# Patient Record
Sex: MALE | Race: WHITE | NOT HISPANIC OR LATINO | Employment: OTHER | URBAN - METROPOLITAN AREA
[De-identification: names, ages, dates, MRNs, and addresses within clinical notes are randomized per-mention and may not be internally consistent; named-entity substitution may affect disease eponyms.]

---

## 2017-01-19 ENCOUNTER — ALLSCRIPTS OFFICE VISIT (OUTPATIENT)
Dept: OTHER | Facility: OTHER | Age: 52
End: 2017-01-19

## 2017-01-19 ENCOUNTER — HOSPITAL ENCOUNTER (OUTPATIENT)
Dept: RADIOLOGY | Facility: CLINIC | Age: 52
Discharge: HOME | End: 2017-01-19
Payer: MEDICARE

## 2017-01-19 DIAGNOSIS — S69.92XA UNSPECIFIED INJURY OF LEFT WRIST, HAND AND FINGER(S), INITIAL ENCOUNTER: ICD-10-CM

## 2017-01-19 DIAGNOSIS — M25.561 PAIN IN RIGHT KNEE: ICD-10-CM

## 2017-01-19 DIAGNOSIS — M25.532 PAIN IN LEFT WRIST: ICD-10-CM

## 2017-01-19 PROCEDURE — 73564 X-RAY EXAM KNEE 4 OR MORE: CPT

## 2017-01-23 ENCOUNTER — ALLSCRIPTS OFFICE VISIT (OUTPATIENT)
Dept: OTHER | Facility: OTHER | Age: 52
End: 2017-01-23

## 2017-01-23 ENCOUNTER — HOSPITAL ENCOUNTER (OUTPATIENT)
Dept: RADIOLOGY | Facility: HOSPITAL | Age: 52
Discharge: HOME | End: 2017-01-23
Payer: MEDICARE

## 2017-01-23 ENCOUNTER — TRANSCRIBE ORDERS (OUTPATIENT)
Dept: ADMINISTRATIVE | Facility: HOSPITAL | Age: 52
End: 2017-01-23

## 2017-01-23 DIAGNOSIS — M25.532 PAIN IN LEFT WRIST: ICD-10-CM

## 2017-01-23 DIAGNOSIS — S69.92XA UNSPECIFIED INJURY OF LEFT WRIST, HAND AND FINGER(S), INITIAL ENCOUNTER: ICD-10-CM

## 2017-01-23 PROCEDURE — 73110 X-RAY EXAM OF WRIST: CPT

## 2017-03-22 ENCOUNTER — ALLSCRIPTS OFFICE VISIT (OUTPATIENT)
Dept: OTHER | Facility: OTHER | Age: 52
End: 2017-03-22

## 2017-03-22 DIAGNOSIS — E78.5 HYPERLIPIDEMIA: ICD-10-CM

## 2017-03-22 DIAGNOSIS — F32.9 MAJOR DEPRESSIVE DISORDER, SINGLE EPISODE: ICD-10-CM

## 2017-03-22 DIAGNOSIS — M10.9 GOUT: ICD-10-CM

## 2017-03-22 DIAGNOSIS — I10 ESSENTIAL (PRIMARY) HYPERTENSION: ICD-10-CM

## 2017-04-18 ENCOUNTER — HOSPITAL ENCOUNTER (EMERGENCY)
Facility: HOSPITAL | Age: 52
Discharge: HOME | End: 2017-04-18
Attending: EMERGENCY MEDICINE
Payer: MEDICARE

## 2017-04-18 ENCOUNTER — APPOINTMENT (EMERGENCY)
Dept: RADIOLOGY | Facility: HOSPITAL | Age: 52
End: 2017-04-18
Payer: MEDICARE

## 2017-04-18 VITALS
BODY MASS INDEX: 27.82 KG/M2 | HEART RATE: 80 BPM | DIASTOLIC BLOOD PRESSURE: 67 MMHG | TEMPERATURE: 98.2 F | RESPIRATION RATE: 20 BRPM | HEIGHT: 63 IN | WEIGHT: 157 LBS | SYSTOLIC BLOOD PRESSURE: 127 MMHG
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
## 2017-04-18 DIAGNOSIS — S83.91XA RIGHT KNEE SPRAIN: ICD-10-CM

## 2017-04-18 PROCEDURE — 73564 X-RAY EXAM KNEE 4 OR MORE: CPT

## 2017-04-18 PROCEDURE — 99284 EMERGENCY DEPT VISIT MOD MDM: CPT

## 2017-04-18 PROCEDURE — 96372 THER/PROPH/DIAG INJ SC/IM: CPT

## 2017-04-18 RX ORDER — DICLOFENAC POTASSIUM 50 MG/1
TABLET, FILM COATED ORAL
COMMUNITY
Start: 2014-12-30 | End: 2018-02-01 | Stop reason: SDUPTHER

## 2017-04-18 RX ORDER — ALLOPURINOL 300 MG/1
TABLET ORAL
COMMUNITY
Start: 2014-07-11 | End: 2018-02-01 | Stop reason: ALTCHOICE

## 2017-04-18 RX ORDER — AMLODIPINE BESYLATE 10 MG/1
TABLET ORAL
COMMUNITY
End: 2018-02-01 | Stop reason: SDUPTHER

## 2017-04-18 RX ORDER — CYCLOBENZAPRINE HCL 10 MG
TABLET ORAL
COMMUNITY
End: 2018-04-26

## 2017-04-18 RX ORDER — KETOROLAC TROMETHAMINE 30 MG/ML
60 INJECTION, SOLUTION INTRAMUSCULAR; INTRAVENOUS ONCE
Status: COMPLETED | OUTPATIENT
Start: 2017-04-18 | End: 2017-04-18

## 2017-04-18 RX ORDER — TRAMADOL HYDROCHLORIDE 50 MG/1
50 TABLET ORAL EVERY 6 HOURS PRN
Qty: 20 TABLET | Refills: 0 | Status: SHIPPED | OUTPATIENT
Start: 2017-04-18 | End: 2017-04-28

## 2017-04-18 RX ADMIN — KETOROLAC TROMETHAMINE 60 MG: 30 INJECTION, SOLUTION INTRAMUSCULAR at 22:47

## 2017-04-24 ENCOUNTER — ALLSCRIPTS OFFICE VISIT (OUTPATIENT)
Dept: OTHER | Facility: OTHER | Age: 52
End: 2017-04-24

## 2017-04-28 ENCOUNTER — ALLSCRIPTS OFFICE VISIT (OUTPATIENT)
Dept: OTHER | Facility: OTHER | Age: 52
End: 2017-04-28

## 2017-05-10 ENCOUNTER — ALLSCRIPTS OFFICE VISIT (OUTPATIENT)
Dept: OTHER | Facility: OTHER | Age: 52
End: 2017-05-10

## 2017-05-10 DIAGNOSIS — M10.9 GOUT: ICD-10-CM

## 2017-05-10 DIAGNOSIS — E78.5 HYPERLIPIDEMIA: ICD-10-CM

## 2017-05-15 ENCOUNTER — ALLSCRIPTS OFFICE VISIT (OUTPATIENT)
Dept: OTHER | Facility: OTHER | Age: 52
End: 2017-05-15

## 2017-09-20 ENCOUNTER — ALLSCRIPTS OFFICE VISIT (OUTPATIENT)
Dept: OTHER | Facility: OTHER | Age: 52
End: 2017-09-20

## 2017-09-20 DIAGNOSIS — M25.50 PAIN IN JOINT: ICD-10-CM

## 2017-09-20 DIAGNOSIS — E78.5 HYPERLIPIDEMIA: ICD-10-CM

## 2018-01-13 VITALS
WEIGHT: 162.4 LBS | SYSTOLIC BLOOD PRESSURE: 122 MMHG | DIASTOLIC BLOOD PRESSURE: 72 MMHG | TEMPERATURE: 98 F | BODY MASS INDEX: 28.77 KG/M2 | HEIGHT: 63 IN | HEART RATE: 78 BPM

## 2018-01-13 VITALS
HEART RATE: 88 BPM | HEIGHT: 63 IN | BODY MASS INDEX: 30.83 KG/M2 | SYSTOLIC BLOOD PRESSURE: 104 MMHG | WEIGHT: 174 LBS | TEMPERATURE: 96.9 F | DIASTOLIC BLOOD PRESSURE: 76 MMHG

## 2018-01-13 VITALS — DIASTOLIC BLOOD PRESSURE: 90 MMHG | TEMPERATURE: 97.2 F | SYSTOLIC BLOOD PRESSURE: 122 MMHG | HEART RATE: 96 BPM

## 2018-01-13 NOTE — MISCELLANEOUS
Message   Recorded as Task   Date: 08/01/2016 07:45 AM, Created By: Pancho Obando   Task Name: Med Renewal Request   Assigned To: [address] [place] [address]   Regarding Patient: Dorita Magallon, Status: Active   Comment:    Jaye Linn - 01 Aug 2016 7:45 AM     TASK CREATED  Caller: Self; Renew Medication; [phone] (Home); [phone] (Work)  Please send uloric acid to the pharmacy  Josse Spicer - 03 Aug 2016 8:45 AM     TASK REASSIGNED: Previously Assigned To Josse Spicer      uloric sent  once he starts this to stop the allopurinol   Lucero Wallace - 03 Aug 2016 10:01 AM     TASK REPLIED TO: Previously Assigned To [address] [place] [address]   pt aware        Active Problems    1  Aftercare following surgery (V58 89) (Z48 89)   2  Anxiety (300 00) (F41 9)   3  Arthritis of right acromioclavicular joint (716 91) (M19 90)   4  Atypical chest pain (786 59) (R07 89)   5  Cervicalgia (723 1) (M54 2)   6  Chest pain (786 50) (R07 9)   7  Chest pain (786 50) (R07 9)   8  Chronic back pain (724 5,338 29) (M54 9,G89 29)   9  Degenerative joint disease (DJD) of lumbar spine (721 3) (M47 816)   10  Depression (311) (F32 9)   11  Dyslipidemia (272 4) (E78 5)   12  Dysphagia (787 20) (R13 10)   13  Encounter for screening colonoscopy (V76 51) (Z12 11)   14  Essential hypertension (401 9) (I10)   15  History of Fracture lumbar vertebra-closed (805 4) (S32 009A)   16  Generalized osteoarthritis (715 00) (M15 9)   17  Gout (274 9) (M10 9)   18  Headache (784 0) (R51)   19  Hyperglycemia (790 29) (R73 9)   20  Knee effusion, left (719 06) (M25 462)   21  Knee osteoarthritis (715 36) (M17 9)   22  Left elbow pain (719 42) (M25 522)   23  Left knee pain (719 46) (M25 562)   24  Left wrist pain (719 43) (M25 532)   25  Lumbar paraspinal muscle spasm (724 8) (M62 830)   26  Lumbar radiculopathy (724 4) (M54 16)   27  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) (M19 032)   28   Primary localized osteoarthritis of left knee (715 16) (M17 12)   29  Right rotator cuff tendinitis (726 10) (M75 81)   30  Rotator cuff disorder (726 10) (M67 919)   31  Sacral pain (724 6) (M53 3)   32  Sacroiliitis (720 2) (M46 1)   33  Shortness of breath (786 05) (R06 02)   34  Shortness of breath (786 05) (R06 02)   35  Shoulder pain (719 41) (M25 519)   36  Sinusitis (473 9) (J32 9)   37  Spondylolisthesis of lumbar region (738 4) (M43 16)   38  Subacromial or subdeltoid bursitis, right (726 19) (M75 51)   39  Tear of infraspinatus tendon, right, sequela (905 8) (S46 811S)   40  Tear of left infraspinatus tendon (840 3) (S46 812A)   41  Tinnitus (388 30) (H93 19)   42  Trochanteric bursitis of both hips (726 5) (M70 61,M70 62)   43  Vertigo (780 4) (R42)   44  White matter abnormality on MRI of brain (793 0) (R93 0)   45  White matter low density on CT of brain (793 0) (R93 0)   46  Wrist sprain (842 00) (S63 509A)    Current Meds   1  AmLODIPine Besylate 10 MG Oral Tablet (Norvasc); TAKE 1 TABLET DAILY  Requested   for: 90Gmk7004; Last Rx:06Nbi1786 Ordered   2  Diclofenac Potassium 50 MG Oral Tablet; 1 tab by mouth every 6 hours for pain; Therapy: 79MET6623 to (Last UU:96INA2542)  Requested for: 77IXI8250 Ordered   3  Escitalopram Oxalate 10 MG Oral Tablet (Lexapro); TAKE 1 TABLET DAILY; Therapy: 01JLQ8283 to (Evaluate:23Jan2017)  Requested for: 26Uxm4355; Last   Rx:64Ask1152 Ordered   4  Hydrocodone-Acetaminophen  MG Oral Tablet; TAKE 1 TABLET Twice daily PRN; Therapy: 29IOR7159 to (Evaluate:51Rbv3210); Last ED:78BFQ6375 Ordered   5  Lisinopril 40 MG Oral Tablet; 2 tabs po daily  Requested for: 39UEL9995; Last   EP:42STZ6063 Ordered   6  Metoprolol Tartrate 100 MG Oral Tablet; TAKE 1 TABLET TWICE DAILY; Therapy: 00NKT9992 to [phone])  Requested for: 79DIK9531; Last   Rx:47Cxe7992 Ordered   7  Uloric 40 MG Oral Tablet; TAKE 1 TABLET BY MOUTH ONCE DAILY;    Therapy: 31Riy0596 to (Evaluate:71Czo4934)  Requested for: 49YED0695; Last   Rx:67Wfe8003 Ordered    Allergies    1  Hydrochlorothiazide TABS   2   Lipitor TABS    Signatures   Electronically signed by : Khushbu Monreal, ; Aug  3 2016 10:02AM EST                       (Author)

## 2018-01-14 VITALS
HEIGHT: 63 IN | BODY MASS INDEX: 30.83 KG/M2 | DIASTOLIC BLOOD PRESSURE: 71 MMHG | HEART RATE: 62 BPM | WEIGHT: 174 LBS | SYSTOLIC BLOOD PRESSURE: 117 MMHG

## 2018-01-14 VITALS
BODY MASS INDEX: 29.34 KG/M2 | SYSTOLIC BLOOD PRESSURE: 110 MMHG | WEIGHT: 165.6 LBS | HEIGHT: 63 IN | TEMPERATURE: 97 F | DIASTOLIC BLOOD PRESSURE: 70 MMHG | HEART RATE: 88 BPM

## 2018-01-14 VITALS
HEIGHT: 63 IN | BODY MASS INDEX: 32.25 KG/M2 | HEART RATE: 84 BPM | SYSTOLIC BLOOD PRESSURE: 142 MMHG | DIASTOLIC BLOOD PRESSURE: 90 MMHG | WEIGHT: 182 LBS

## 2018-01-14 VITALS
SYSTOLIC BLOOD PRESSURE: 130 MMHG | DIASTOLIC BLOOD PRESSURE: 88 MMHG | HEIGHT: 63 IN | TEMPERATURE: 97.2 F | BODY MASS INDEX: 27.14 KG/M2 | HEART RATE: 84 BPM | WEIGHT: 153.2 LBS

## 2018-01-14 VITALS
SYSTOLIC BLOOD PRESSURE: 140 MMHG | BODY MASS INDEX: 33.06 KG/M2 | HEIGHT: 63 IN | HEART RATE: 86 BPM | WEIGHT: 186.6 LBS | TEMPERATURE: 97.2 F | DIASTOLIC BLOOD PRESSURE: 88 MMHG

## 2018-01-15 NOTE — RESULT NOTES
Verified Results  (1) CBC/PLT/DIFF 13Roi6285 07:56AM Rima Spicer Order Number: [id number]     Test Name Result Flag Reference   WBC COUNT 6 37 Thousand/uL  4 31-10 16   RBC COUNT 4 75 Million/uL  3 88-5 62   HEMOGLOBIN 14 0 g/dL  12 0-17 0   HEMATOCRIT 41 5 %  36 5-49 3   MCV 87 fL  82-98   MCH 29 5 pg  26 8-34 3   MCHC 33 7 g/dL  31 4-37 4   RDW 13 8 %  11 6-15 1   MPV 9 7 fL  8 9-12 7   PLATELET COUNT 184 Thousands/uL  149-390   - Patient Instructions: This bloodwork is non-fasting  Please drink two glasses of water morning of bloodwork  - Patient Instructions: This bloodwork is non-fasting  Please drink two glasses of water morning of bloodwork  This is an appended report  These results have been appended to a previously verified report  NEUTROPHILS - REL 40 % L 43-75   BANDS - REL 3 %  0-8   LYMPHOCYTES - REL 41 %  14-44   MONOCYTES - REL 10 %  4-12   EOSINOPHILS - REL 3 %  0-6   BASOPHILS - REL 0 %  0-1   METAMYELOCYTES 2 % H 0-1   MYELOCYTES 1 %  0-1   NEUTROPHILS ABS 2 74 Thousand/uL  1 85-7 62   LYMPHOTCYTES ABS 2 61 Thousand/uL  0 60-4 47   MONOCYTES ABS 0 64 Thousand/uL  0 00-1 22   EOSINOPHILS ABS 0 19 Thousand/uL  0 00-0 40   BASOPHILS ABS 0 00 Thousand/uL  0 00-0 10   TOTAL COUNTED 100     RBC MORPHOLOGY Normal     PLT ESTIMATE Adequate  Adequate   - Patient Instructions: This bloodwork is non-fasting  Please drink two glasses of water morning of bloodwork  - Patient Instructions: This bloodwork is non-fasting  Please drink two glasses of water morning of bloodwork  (1) COMPREHENSIVE METABOLIC PANEL 53OOX7018 34:67CM Rima Spicer Order Number: [id number]     Test Name Result Flag Reference   GLUCOSE,RANDM 128 mg/dL     If the patient is fasting, the ADA then defines impaired fasting glucose as > 100 mg/dL and diabetes as > or equal to 123 mg/dL     SODIUM 136 mmol/L  136-145   POTASSIUM 3 9 mmol/L  3 5-5 3   CHLORIDE 99 mmol/L L 100-108 CARBON DIOXIDE 23 mmol/L  21-32   ANION GAP (CALC) 14 mmol/L H 4-13   BLOOD UREA NITROGEN 19 mg/dL  5-25   CREATININE 0 99 mg/dL  0 60-1 30   Standardized to IDMS reference method   CALCIUM 9 1 mg/dL  8 3-10 1   BILI, TOTAL 0 50 mg/dL  0 20-1 00   ALK PHOSPHATAS 59 U/L     ALT (SGPT) 65 U/L  12-78   AST(SGOT) 29 U/L  5-45   ALBUMIN 3 5 g/dL  3 5-5 0   TOTAL PROTEIN 7 2 g/dL  6 4-8 2   eGFR Non-African American      >60 0 ml/min/1 73sq m   - Patient Instructions: This is a fasting blood test  Water,black tea or black  coffee only after 9:00pm the night before test Drink 2 glasses of water the morning of test   National Kidney Disease Education Program recommendations are as follows:  GFR calculation is accurate only with a steady state creatinine  Chronic Kidney disease less than 60 ml/min/1 73 sq  meters  Kidney failure less than 15 ml/min/1 73 sq  meters  (1) LIPID PANEL, FASTING 89Vfe2866 07:56AM Salma Spicer Order Number: [id number]     Test Name Result Flag Reference   CHOLESTEROL 239 mg/dL H    HDL,DIRECT 42 mg/dL  40-60   Specimen collection should occur prior to Metamizole administration due to the potential for falsely depressed results  LDL CHOLESTEROL CALCULATED 138 mg/dL H 0-100   - Patient Instructions: This is a fasting blood test  Water,black tea or black  coffee only after 9:00pm the night before test   Drink 2 glasses of water the morning of test     - Patient Instructions:  This is a fasting blood test  Water,black tea or black  coffee only after 9:00pm the night before test Drink 2 glasses of water the morning of test   Triglyceride:         Normal              <150 mg/dl       Borderline High    150-199 mg/dl       High               200-499 mg/dl       Very High          >499 mg/dl  Cholesterol:         Desirable        <200 mg/dl      Borderline High  200-239 mg/dl      High             >239 mg/dl  HDL Cholesterol:        High    >59 mg/dL      Low <41 mg/dL  LDL CALCULATED:    This screening LDL is a calculated result  It does not have the accuracy of the Direct Measured LDL in the monitoring of patients with hyperlipidemia and/or statin therapy  Direct Measure LDL (CSY150) must be ordered separately in these patients  TRIGLYCERIDES 293 mg/dL H <=150   Specimen collection should occur prior to N-Acetylcysteine or Metamizole administration due to the potential for falsely depressed results  (1) URIC ACID 28Jul2016 07:56AM Tonia Spicer Order Number: [id number]     Test Name Result Flag Reference   URIC ACID 9 6 mg/dL H 4 2-8 0   Specimen collection should occur prior to Metamizole administration due to the potential for falsely depressed results  - Patient Instructions: This is a fasting blood test  Water,black tea or black  coffee only after 9:00pm the night before test Drink 2 glasses of water the morning of test      (1) TSH WITH FT4 REFLEX 28Jul2016 07:56AM Tonia Spicer Order Number: [id number]     Test Name Result Flag Reference   TSH 2 177 uIU/mL  0 358-3 740   - Patient Instructions: This is a fasting blood test  Water,black tea or black  coffee only after 9:00pm the night before test Drink 2 glasses of water the morning of test   Patients undergoing fluorescein dye angiography may retain small amounts of fluorescein in the body for 48-72 hours post procedure  Samples containing fluorescein can produce falsely depressed TSH values  If the patient had this procedure,a specimen should be resubmitted post fluorescein clearance  Plan  Hyperglycemia    · (1) GLUCOSE,  FASTING; Status:Active; Requested for:88Cpg0594;    · (1) HEMOGLOBIN A1C; Status:Active; Requested for:14Bst3693;     Discussion/Summary    please call  his blood sugar is elevated  recommend repeat FBS and A1c to further evaluate for diabetesl  orders placed  to be done at his earliest convenience     his uric acid is also elevated despite being on allopurinol  recommend trying a different uric acid lowering agent such as uloric 40 mg daily  we can give him samples of this if available  will then check uric acid levels again in about 2 months   patient aware

## 2018-02-01 ENCOUNTER — OFFICE VISIT (OUTPATIENT)
Dept: FAMILY MEDICINE CLINIC | Facility: HOSPITAL | Age: 53
End: 2018-02-01
Payer: MEDICARE

## 2018-02-01 ENCOUNTER — TELEPHONE (OUTPATIENT)
Dept: FAMILY MEDICINE CLINIC | Facility: HOSPITAL | Age: 53
End: 2018-02-01

## 2018-02-01 VITALS
HEIGHT: 63 IN | BODY MASS INDEX: 27.25 KG/M2 | WEIGHT: 153.8 LBS | HEART RATE: 86 BPM | DIASTOLIC BLOOD PRESSURE: 96 MMHG | SYSTOLIC BLOOD PRESSURE: 152 MMHG | TEMPERATURE: 97.1 F

## 2018-02-01 DIAGNOSIS — Z23 NEED FOR TDAP VACCINATION: ICD-10-CM

## 2018-02-01 DIAGNOSIS — F32.A DEPRESSION, UNSPECIFIED DEPRESSION TYPE: ICD-10-CM

## 2018-02-01 DIAGNOSIS — L03.116 CELLULITIS OF LEFT FOOT: Primary | ICD-10-CM

## 2018-02-01 DIAGNOSIS — M10.9 GOUT, UNSPECIFIED CAUSE, UNSPECIFIED CHRONICITY, UNSPECIFIED SITE: Primary | ICD-10-CM

## 2018-02-01 DIAGNOSIS — I10 ESSENTIAL HYPERTENSION: ICD-10-CM

## 2018-02-01 PROCEDURE — 90715 TDAP VACCINE 7 YRS/> IM: CPT | Performed by: NURSE PRACTITIONER

## 2018-02-01 PROCEDURE — 99214 OFFICE O/P EST MOD 30 MIN: CPT | Performed by: NURSE PRACTITIONER

## 2018-02-01 PROCEDURE — 90471 IMMUNIZATION ADMIN: CPT | Performed by: NURSE PRACTITIONER

## 2018-02-01 RX ORDER — LISINOPRIL 40 MG/1
40 TABLET ORAL DAILY
Qty: 90 TABLET | Refills: 0 | Status: SHIPPED | OUTPATIENT
Start: 2018-02-01 | End: 2019-10-18 | Stop reason: SDUPTHER

## 2018-02-01 RX ORDER — INDOMETHACIN 50 MG/1
1 CAPSULE ORAL EVERY 8 HOURS
COMMUNITY
Start: 2017-01-23 | End: 2018-02-01 | Stop reason: SDUPTHER

## 2018-02-01 RX ORDER — COLCHICINE 0.6 MG/1
1 CAPSULE ORAL DAILY
COMMUNITY
Start: 2017-03-22 | End: 2018-02-01 | Stop reason: SDUPTHER

## 2018-02-01 RX ORDER — AMLODIPINE BESYLATE 10 MG/1
1 TABLET ORAL DAILY
COMMUNITY
Start: 2017-02-22 | End: 2018-02-01 | Stop reason: SDUPTHER

## 2018-02-01 RX ORDER — ESCITALOPRAM OXALATE 10 MG/1
1 TABLET ORAL DAILY
COMMUNITY
Start: 2017-09-20 | End: 2018-02-01 | Stop reason: SDUPTHER

## 2018-02-01 RX ORDER — LISINOPRIL 40 MG/1
2 TABLET ORAL DAILY
COMMUNITY
End: 2018-02-01 | Stop reason: SDUPTHER

## 2018-02-01 RX ORDER — ESCITALOPRAM OXALATE 20 MG/1
20 TABLET ORAL DAILY
Qty: 90 TABLET | Refills: 0 | Status: SHIPPED | OUTPATIENT
Start: 2018-02-01 | End: 2018-11-29 | Stop reason: SDUPTHER

## 2018-02-01 RX ORDER — FEBUXOSTAT 40 MG/1
1 TABLET, FILM COATED ORAL DAILY
COMMUNITY
Start: 2016-08-03 | End: 2018-02-01 | Stop reason: SDUPTHER

## 2018-02-01 RX ORDER — AMLODIPINE BESYLATE 10 MG/1
10 TABLET ORAL DAILY
Qty: 90 TABLET | Refills: 0 | Status: SHIPPED | OUTPATIENT
Start: 2018-02-01 | End: 2019-10-18 | Stop reason: SDUPTHER

## 2018-02-01 RX ORDER — SULFAMETHOXAZOLE AND TRIMETHOPRIM 800; 160 MG/1; MG/1
1 TABLET ORAL EVERY 12 HOURS SCHEDULED
Qty: 20 TABLET | Refills: 0 | Status: SHIPPED | OUTPATIENT
Start: 2018-02-01 | End: 2018-02-01 | Stop reason: SDUPTHER

## 2018-02-01 RX ORDER — FEBUXOSTAT 40 MG/1
40 TABLET, FILM COATED ORAL DAILY
Qty: 90 TABLET | Refills: 0 | Status: SHIPPED | OUTPATIENT
Start: 2018-02-01 | End: 2018-02-02 | Stop reason: ALTCHOICE

## 2018-02-01 RX ORDER — COLCHICINE 0.6 MG/1
1 CAPSULE ORAL DAILY
Qty: 30 CAPSULE | Refills: 0 | Status: SHIPPED | OUTPATIENT
Start: 2018-02-01 | End: 2018-02-02

## 2018-02-01 RX ORDER — METOPROLOL TARTRATE 100 MG/1
TABLET ORAL
COMMUNITY
Start: 2014-07-11 | End: 2018-02-01 | Stop reason: SDUPTHER

## 2018-02-01 RX ORDER — METOPROLOL TARTRATE 100 MG/1
100 TABLET ORAL EVERY 12 HOURS SCHEDULED
Qty: 180 TABLET | Refills: 0 | Status: SHIPPED | OUTPATIENT
Start: 2018-02-01 | End: 2019-10-18 | Stop reason: SDUPTHER

## 2018-02-01 RX ORDER — ESCITALOPRAM OXALATE 20 MG/1
1 TABLET ORAL DAILY
COMMUNITY
Start: 2016-02-17 | End: 2018-02-01 | Stop reason: SDUPTHER

## 2018-02-01 RX ORDER — DICLOFENAC POTASSIUM 50 MG/1
50 TABLET, FILM COATED ORAL 3 TIMES DAILY PRN
Qty: 30 TABLET | Refills: 3 | Status: SHIPPED | OUTPATIENT
Start: 2018-02-01 | End: 2018-04-26

## 2018-02-01 NOTE — TELEPHONE ENCOUNTER
Needs all current meds sent to [facility]   There are too many to name, we should have them all in his record

## 2018-02-01 NOTE — PROGRESS NOTES
Assessment/Plan:         Diagnoses and all orders for this visit:    Cellulitis of left foot  -     sulfamethoxazole-trimethoprim (BACTRIM DS) 800-160 mg per tablet; Take 1 tablet by mouth every 12 (twelve) hours for 10 days    Other orders  -     lisinopril (ZESTRIL) 40 mg tablet; Take 2 tablets by mouth daily  -     metoprolol tartrate (LOPRESSOR) 100 mg tablet; Take by mouth  -     escitalopram (LEXAPRO) 10 mg tablet; Take 1 tablet by mouth daily  -     indomethacin (INDOCIN) 50 mg capsule; Take 1 capsule by mouth every 8 (eight) hours  -     escitalopram (LEXAPRO) 20 mg tablet; Take 1 tablet by mouth daily  -     febuxostat (ULORIC) 40 mg tablet; Take 1 tablet by mouth daily  -     amLODIPine (NORVASC) 10 mg tablet; Take 1 tablet by mouth daily  -     Colchicine (MITIGARE) 0 6 MG CAPS; Take 1 capsule by mouth daily        Left foot cellulitis r/t puncture from nail  Will treat with antibiotic and update tetanus  Instructed to call with any worsening or no improvement in  4 days  F/U in 1 week  Subjective:      Patient ID: Arnaldo Hurst is a 47 y o  male  Stepped on nail left foot about 2 days ago  Went through shoe  Pulled nail out of foot intact  Swelling with pain right away  Epsom salt soaks  Soaked in alcohol  Redness started yesterday  Foot very hot  Throbbing  No fever  No drainage  Painful  The following portions of the patient's history were reviewed and updated as appropriate: allergies, current medications, past medical history, past social history and problem list     Review of Systems   Constitutional: Negative for fever  Musculoskeletal:        Left foot pain and swelling  Skin:        Left foot erythema, no draiange  Objective:     Physical Exam   Constitutional: He is oriented to person, place, and time  He appears well-developed and well-nourished  Musculoskeletal: He exhibits edema and tenderness          Feet:    Neurological: He is alert and oriented to person, place, and time  Psychiatric: He has a normal mood and affect

## 2018-02-02 ENCOUNTER — TELEPHONE (OUTPATIENT)
Dept: FAMILY MEDICINE CLINIC | Facility: HOSPITAL | Age: 53
End: 2018-02-02

## 2018-02-02 DIAGNOSIS — M10.9 GOUT, UNSPECIFIED CAUSE, UNSPECIFIED CHRONICITY, UNSPECIFIED SITE: ICD-10-CM

## 2018-02-02 RX ORDER — ALLOPURINOL 300 MG/1
300 TABLET ORAL DAILY
Qty: 90 TABLET | Refills: 1 | Status: SHIPPED | OUTPATIENT
Start: 2018-02-02 | End: 2018-04-26

## 2018-02-02 RX ORDER — COLCHICINE 0.6 MG/1
0.6 TABLET ORAL DAILY
Qty: 90 TABLET | Refills: 0 | Status: SHIPPED | OUTPATIENT
Start: 2018-02-02 | End: 2018-02-03 | Stop reason: ALTCHOICE

## 2018-02-03 DIAGNOSIS — M10.9 GOUT, UNSPECIFIED CAUSE, UNSPECIFIED CHRONICITY, UNSPECIFIED SITE: ICD-10-CM

## 2018-02-03 RX ORDER — COLCHICINE 0.6 MG/1
0.6 TABLET, FILM COATED ORAL DAILY
Qty: 90 TABLET | Refills: 0 | Status: SHIPPED | OUTPATIENT
Start: 2018-02-03 | End: 2018-04-26

## 2018-02-08 ENCOUNTER — OFFICE VISIT (OUTPATIENT)
Dept: FAMILY MEDICINE CLINIC | Facility: HOSPITAL | Age: 53
End: 2018-02-08
Payer: MEDICARE

## 2018-02-08 VITALS
WEIGHT: 156.6 LBS | HEIGHT: 63 IN | RESPIRATION RATE: 18 BRPM | SYSTOLIC BLOOD PRESSURE: 122 MMHG | TEMPERATURE: 96.1 F | HEART RATE: 84 BPM | BODY MASS INDEX: 27.75 KG/M2 | DIASTOLIC BLOOD PRESSURE: 80 MMHG

## 2018-02-08 DIAGNOSIS — N52.9 ERECTILE DYSFUNCTION, UNSPECIFIED ERECTILE DYSFUNCTION TYPE: ICD-10-CM

## 2018-02-08 DIAGNOSIS — F33.41 RECURRENT MAJOR DEPRESSIVE DISORDER, IN PARTIAL REMISSION (HCC): ICD-10-CM

## 2018-02-08 DIAGNOSIS — N52.8 OTHER MALE ERECTILE DYSFUNCTION: ICD-10-CM

## 2018-02-08 DIAGNOSIS — M17.10 OSTEOARTHRITIS OF KNEE, UNSPECIFIED LATERALITY, UNSPECIFIED OSTEOARTHRITIS TYPE: ICD-10-CM

## 2018-02-08 DIAGNOSIS — F41.9 ANXIETY: Primary | ICD-10-CM

## 2018-02-08 DIAGNOSIS — I10 ESSENTIAL HYPERTENSION: ICD-10-CM

## 2018-02-08 DIAGNOSIS — M25.512 CHRONIC PAIN OF BOTH SHOULDERS: ICD-10-CM

## 2018-02-08 DIAGNOSIS — M25.511 CHRONIC PAIN OF BOTH SHOULDERS: ICD-10-CM

## 2018-02-08 DIAGNOSIS — M1A.00X0 IDIOPATHIC CHRONIC GOUT WITHOUT TOPHUS, UNSPECIFIED SITE: ICD-10-CM

## 2018-02-08 DIAGNOSIS — M67.911 DISORDER OF ROTATOR CUFF OF BOTH SHOULDERS: ICD-10-CM

## 2018-02-08 DIAGNOSIS — G89.29 CHRONIC PAIN OF BOTH SHOULDERS: ICD-10-CM

## 2018-02-08 DIAGNOSIS — E78.5 HYPERLIPIDEMIA, UNSPECIFIED HYPERLIPIDEMIA TYPE: ICD-10-CM

## 2018-02-08 DIAGNOSIS — M67.912 DISORDER OF ROTATOR CUFF OF BOTH SHOULDERS: ICD-10-CM

## 2018-02-08 PROBLEM — M17.11 PRIMARY LOCALIZED OSTEOARTHRITIS OF RIGHT KNEE: Status: ACTIVE | Noted: 2017-01-19

## 2018-02-08 PROBLEM — S69.92XA INJURY OF WRIST, LEFT: Status: ACTIVE | Noted: 2017-01-23

## 2018-02-08 PROBLEM — M25.50 POLYARTHRALGIA: Status: ACTIVE | Noted: 2017-09-20

## 2018-02-08 PROCEDURE — 99214 OFFICE O/P EST MOD 30 MIN: CPT | Performed by: FAMILY MEDICINE

## 2018-02-08 RX ORDER — SILDENAFIL 50 MG/1
50 TABLET, FILM COATED ORAL DAILY PRN
Qty: 10 TABLET | Refills: 0 | Status: SHIPPED | OUTPATIENT
Start: 2018-02-08 | End: 2018-02-08 | Stop reason: SDUPTHER

## 2018-02-08 NOTE — ASSESSMENT & PLAN NOTE
Patient is doing relatively okay  This has remained stable  No thoughts of hurting self no homicidality ideation  Will continue Lexapro 30 mg once a day  Advise to be consistent with his medication

## 2018-02-08 NOTE — ASSESSMENT & PLAN NOTE
Patient with chronic pain of both shoulders  With a history of internal derangement of both shoulders  Likely with bilateral rotator cuff disorder  Advise to continue follow-up with Orthopedics

## 2018-02-08 NOTE — ASSESSMENT & PLAN NOTE
With recurrent gouty attacks  Advise regarding alcohol, red meat, seafood  Will check a uric acid level  To continue on allopurinol  Continue on colchicine on daily basis  To use Cataflam as needed

## 2018-02-08 NOTE — PROGRESS NOTES
Patient is here for follow up of chronic conditions  1  Depression and anxiety  Doing a little bit better with higher dose of Lexapro  For while he was not taking 30 mg as previously advised  He reports he is doing okay however  2  His left knee has a history of osteoarthritis  This is worsening  Increased pain  The knee is giving out  Has not seen Orthopedics in a while  3  Has a history of chronic shoulder pain due to rotator cuff disorder  Had had tears in the past   Worsening symptoms of shoulder pain  4  Gout  Has had increasing symptoms of pain  Most recently is seems to be due to gross beef  Had swelling of the left knee  5  Hypertension  This has been doing well  He is doing well on his medications  6  Erectile dysfunction  Noting more erectile dysfunction  He does get morning erections  The records are not his heart is a used to be  Review of Systems   Constitutional: Negative  Negative for activity change, appetite change, chills and diaphoresis  HENT: Negative for congestion and dental problem  Respiratory: Negative  Negative for apnea, chest tightness, shortness of breath and wheezing  Cardiovascular: Negative  Negative for chest pain, palpitations and leg swelling  Gastrointestinal: Negative  Negative for abdominal distention, abdominal pain, constipation, diarrhea and nausea  Genitourinary: Negative  Negative for difficulty urinating, dysuria and frequency  Social History     Social History    Marital status:      Spouse name: N/A    Number of children: N/A    Years of education: N/A     Occupational History    Not on file       Social History Main Topics    Smoking status: Never Smoker    Smokeless tobacco: Never Used    Alcohol use Yes      Comment: social    Drug use: No    Sexual activity: Not on file     Other Topics Concern    Not on file     Social History Narrative    No narrative on file               Allergies Allergen Reactions    Lipitor [Atorvastatin] Other (See Comments) and Myalgia     myalgia    Hctz [Hydrochlorothiazide] Rash and Hives       Immunization History   Administered Date(s) Administered    Td (adult), adsorbed 01/01/2001    Tdap 02/01/2018       Vitals:    02/08/18 1353   BP: 122/80   Pulse: 84   Resp: 18   Temp: (!) 96 1 °F (35 6 °C)     Physical Exam   Constitutional: He is oriented to person, place, and time  He appears well-developed and well-nourished  HENT:   Head: Normocephalic and atraumatic  Eyes: EOM are normal  Pupils are equal, round, and reactive to light  Neck: Normal range of motion  Neck supple  Cardiovascular: Normal rate, regular rhythm and normal heart sounds  Pulmonary/Chest: Effort normal and breath sounds normal    Abdominal: Soft  Bowel sounds are normal    Musculoskeletal:        Left knee: He exhibits decreased range of motion  He exhibits no swelling, no effusion, no ecchymosis, no laceration, no erythema and normal alignment  No tenderness found  No medial joint line, no lateral joint line, no MCL, no LCL and no patellar tendon tenderness noted  Neurological: He is alert and oriented to person, place, and time  Skin: Skin is warm and dry  Psychiatric: He has a normal mood and affect  His behavior is normal    Nursing note and vitals reviewed  Problem List Items Addressed This Visit     Anxiety - Primary    Recurrent major depressive disorder, in partial remission [facility])       Patient is doing relatively okay  This has remained stable  No thoughts of hurting self no homicidality ideation  Will continue Lexapro 30 mg once a day  Advise to be consistent with his medication  Essential hypertension    Relevant Orders    CBC and differential    Comprehensive metabolic panel    TSH, 3rd generation with T4 reflex    Idiopathic chronic gout       With recurrent gouty attacks  Advise regarding alcohol, red meat, seafood    Will check a uric acid level  To continue on allopurinol  Continue on colchicine on daily basis  To use Cataflam as needed  Relevant Orders    Uric acid    Hyperlipidemia    Relevant Orders    TSH, 3rd generation with T4 reflex    Lipid Panel with Direct LDL reflex    Knee osteoarthritis       Patient with known history of left knee arthritis  Has had chronic knee pain with this  Advise further evaluation by Orthopedics  May need to consider knee replacement  Relevant Orders    Ambulatory referral to Orthopedic Surgery    Rotator cuff disorder    Chronic pain of both shoulders       Patient with chronic pain of both shoulders  With a history of internal derangement of both shoulders  Likely with bilateral rotator cuff disorder  Advise to continue follow-up with Orthopedics  Other male erectile dysfunction       Advised a trial of Viagra  Other Visit Diagnoses     Erectile dysfunction, unspecified erectile dysfunction type        Relevant Medications    sildenafil (VIAGRA) 50 MG tablet        To call our office if any concerns/questions at [phone]  Health Maintenance Due   Topic Date Due    HIV SCREENING  1965    INFLUENZA VACCINE  09/01/2017       Return in about 3 months (around 5/8/2018)

## 2018-02-09 RX ORDER — SILDENAFIL 50 MG/1
50 TABLET, FILM COATED ORAL DAILY PRN
Qty: 10 TABLET | Refills: 0 | Status: SHIPPED | OUTPATIENT
Start: 2018-02-09 | End: 2018-04-26

## 2018-04-26 ENCOUNTER — HOSPITAL ENCOUNTER (EMERGENCY)
Facility: HOSPITAL | Age: 53
Discharge: HOME | End: 2018-04-26
Attending: EMERGENCY MEDICINE | Admitting: EMERGENCY MEDICINE
Payer: MEDICARE

## 2018-04-26 ENCOUNTER — APPOINTMENT (EMERGENCY)
Dept: RADIOLOGY | Facility: HOSPITAL | Age: 53
End: 2018-04-26
Payer: MEDICARE

## 2018-04-26 VITALS
OXYGEN SATURATION: 97 % | WEIGHT: 148 LBS | BODY MASS INDEX: 26.22 KG/M2 | HEIGHT: 63 IN | DIASTOLIC BLOOD PRESSURE: 92 MMHG | RESPIRATION RATE: 18 BRPM | TEMPERATURE: 98.7 F | HEART RATE: 61 BPM | SYSTOLIC BLOOD PRESSURE: 154 MMHG

## 2018-04-26 DIAGNOSIS — S90.30XA CONTUSION OF FOOT: Primary | ICD-10-CM

## 2018-04-26 PROCEDURE — 73630 X-RAY EXAM OF FOOT: CPT

## 2018-04-26 PROCEDURE — 99283 EMERGENCY DEPT VISIT LOW MDM: CPT

## 2018-04-26 RX ORDER — OXYCODONE HYDROCHLORIDE AND ACETAMINOPHEN 5; 325 MG/1; MG/1
1 TABLET ORAL EVERY 6 HOURS PRN
Qty: 12 TABLET | Refills: 0 | Status: SHIPPED | OUTPATIENT
Start: 2018-04-26 | End: 2018-04-29

## 2018-04-26 RX ORDER — INDOMETHACIN 50 MG/1
50 CAPSULE ORAL
COMMUNITY
End: 2018-11-29 | Stop reason: SDUPTHER

## 2018-04-26 RX ORDER — OXYCODONE HYDROCHLORIDE AND ACETAMINOPHEN 5; 325 MG/1; MG/1
1 TABLET ORAL ONCE
Status: COMPLETED | OUTPATIENT
Start: 2018-04-26 | End: 2018-04-26

## 2018-04-26 RX ADMIN — OXYCODONE HYDROCHLORIDE AND ACETAMINOPHEN 1 TABLET: 5; 325 TABLET ORAL at 19:35

## 2018-04-26 NOTE — DISCHARGE INSTRUCTIONS
Do not drive or operate equipment while taking percocet  It is narcotic medication and can be sedating  Follow up with PCP if symptoms are not improving  Foot Contusion   WHAT YOU NEED TO KNOW:   A foot contusion is a bruise to the foot  DISCHARGE INSTRUCTIONS:   Medicines:   · NSAIDs:  These medicines decrease swelling and pain  NSAIDs are available without a doctor's order  Ask your healthcare provider which medicine is right for you  Ask how much to take and when to take it  Take as directed  NSAIDs can cause stomach bleeding and kidney problems if not taken correctly  · Take your medicine as directed  Contact your healthcare provider if you think your medicine is not helping or if you have side effects  Tell him of her if you are allergic to any medicine  Keep a list of the medicines, vitamins, and herbs you take  Include the amounts, and when and why you take them  Bring the list or the pill bottles to follow-up visits  Carry your medicine list with you in case of an emergency  Follow up with your healthcare provider as directed:  Write down your questions so you remember to ask them during your visits  Care for your foot: Follow your treatment plan to help decrease your pain and improve your muscle movement  · Rest:  You will need to rest your foot for 1 to 2 days after your injury  This will help decrease the risk of more damage  · Ice:  Ice helps decrease swelling and pain  Ice may also help prevent tissue damage  Use an ice pack, or put crushed ice in a plastic bag  Cover it with a towel and place it on your foot for 15 to 20 minutes every hour or as directed  · Compression:  Compression (tight hold) provides support and helps decrease swelling and movement so your foot can heal  You may be told to keep your foot wrapped with a tight elastic bandage  Follow instructions about how to apply your bandage  Do not massage your foot  You could cause more damage or pain      · Elevation:  Keep your foot raised above the level of your heart while you are sitting or lying down  This will help decrease or limit swelling  Use pillows, blankets, or rolled towels to elevate your foot comfortably  Exercise your foot:  You may be given gentle exercises to improve your foot movement and help decrease stiffness  Ask when you can return to your normal activities or sports  Prevent another injury:   · Wear equipment to protect yourself when you play sports  · Make sure your shoes fit properly  · Always wear shoes on streets or sidewalks  · Clean spills off the floor right away to avoid slipping or hitting your foot  · Make sure your home is well lit when you get up during the night  This will help you avoid hurting your foot in the dark  Contact your healthcare provider if:   · You have increased swelling on your foot  · You have severe foot pain  · You are not able to move your foot  · You have questions or concerns about your injury or treatment  © 2017 [facility] Information is for End User's use only and may not be sold, redistributed or otherwise used for commercial purposes  All illustrations and images included in CareNotes® are the copyrighted property of [employer]  or [address]  The above information is an  only  It is not intended as medical advice for individual conditions or treatments  Talk to your doctor, nurse or pharmacist before following any medical regimen to see if it is safe and effective for you

## 2018-05-04 NOTE — ED PROVIDER NOTES
History  Chief Complaint   Patient presents with    Foot Injury     patient presents to ER stating he dropped a steel metal sheet on right 1st toe     71-year-old male presents to ER stating he dropped a steel metal sheet on right 1st toe  Patient states he was moving some things from a table when a steel metal sheet slipped off the table and fell on his right big toe  Patient states that the metal sheet was approximately 30 lb and states he has pain with walking and movement of toe  Patient has history of gout and states that toe usually gets swollen at MCP joint with gout flare" but this swelling is after injury  Prior to Admission Medications   Prescriptions Last Dose Informant Patient Reported? Taking? amLODIPine (NORVASC) 10 mg tablet   No No   Sig: Take 1 tablet (10 mg total) by mouth daily   escitalopram (LEXAPRO) 20 mg tablet   No No   Sig: Take 1 tablet (20 mg total) by mouth daily   Patient taking differently: Take 30 mg by mouth daily     indomethacin (INDOCIN) 50 mg capsule   Yes Yes   Sig: Take 50 mg by mouth 3 (three) times a day with meals   lisinopril (ZESTRIL) 40 mg tablet   No No   Sig: Take 1 tablet (40 mg total) by mouth daily   metoprolol tartrate (LOPRESSOR) 100 mg tablet   No No   Sig: Take 1 tablet (100 mg total) by mouth every 12 (twelve) hours      Facility-Administered Medications: None       Past Medical History:   Diagnosis Date    Depression     Gout     Hypertension        Past Surgical History:   Procedure Laterality Date    SHOULDER SURGERY         History reviewed  No pertinent family history  I have reviewed and agree with the history as documented  Social History   Substance Use Topics    Smoking status: Never Smoker    Smokeless tobacco: Never Used    Alcohol use Yes      Comment: social        Review of Systems   Constitutional: Negative for chills and fever  Gastrointestinal: Negative for constipation, diarrhea, nausea and vomiting  Musculoskeletal: Positive for arthralgias, gait problem, joint swelling and myalgias  Neurological: Negative for weakness and numbness  All other systems reviewed and are negative  Physical Exam  ED Triage Vitals [04/26/18 1838]   Temperature Pulse Respirations Blood Pressure SpO2   98 7 °F (37 1 °C) 61 18 154/92 97 %      Temp src Heart Rate Source Patient Position - Orthostatic VS BP Location FiO2 (%)   -- Monitor Lying Right arm --      Pain Score       Worst Possible Pain           Orthostatic Vital Signs  Vitals:    04/26/18 1838   BP: 154/92   Pulse: 61   Patient Position - Orthostatic VS: Lying       Physical Exam   Constitutional: He is oriented to person, place, and time  Vital signs are normal  He appears well-developed and well-nourished  HENT:   Head: Normocephalic and atraumatic  Eyes: Conjunctivae and EOM are normal  Pupils are equal, round, and reactive to light  Neck: Normal range of motion  Neck supple  Cardiovascular: Normal rate, regular rhythm and normal heart sounds  Pulses:       Dorsalis pedis pulses are 2+ on the right side, and 2+ on the left side  Pulmonary/Chest: Effort normal and breath sounds normal    Musculoskeletal:        Right foot: There is decreased range of motion, tenderness, bony tenderness and swelling  There is normal capillary refill and no deformity  Feet:    Feet:   Right Foot:   Skin Integrity: Positive for erythema  Neurological: He is alert and oriented to person, place, and time  Skin: Skin is warm and dry  Psychiatric: He has a normal mood and affect  His speech is normal and behavior is normal  Judgment and thought content normal  Cognition and memory are normal    Nursing note and vitals reviewed        ED Medications  Medications   oxyCODONE-acetaminophen (PERCOCET) 5-325 mg per tablet 1 tablet (1 tablet Oral Given 4/26/18 1935)       Diagnostic Studies  Results Reviewed     None                 XR foot 3+ views RIGHT   Final Result by Hector Samson MD (04/27 0109)         1  No acute fracture  2   Severe degenerative change in the 1st metatarsal phalangeal joint  Workstation performed: [id number]                    Procedures  Procedures       Phone Contacts  ED Phone Contact    ED Course  ED Course as of May 04 1007   Thu Apr 26, 2018   1929 PDMP showed no pain medication in last year                                MDM  Number of Diagnoses or Management Options  Contusion of foot: new and requires workup     Amount and/or Complexity of Data Reviewed  Tests in the radiology section of CPT®: ordered and reviewed    Patient Progress  Patient progress: stable    CritCare Time    Disposition  Final diagnoses:   Contusion of foot     Time reflects when diagnosis was documented in both MDM as applicable and the Disposition within this note     Time User Action Codes Description Comment    4/26/2018  7:53 PM Francois Martin Add [S90 30XA] Contusion of foot       ED Disposition     ED Disposition Condition Comment    Discharge  Beau Osborn discharge to home/self care      Condition at discharge: Stable        Follow-up Information     Follow up With Specialties Details Why Contact Info    Laron Mortimer, MD Family Medicine Call For Recheck, If symptoms worsen [place] [id number]  [address] [place] [address]  [id number] [place] [address] [phone]          Discharge Medication List as of 4/26/2018  7:56 PM      START taking these medications    Details   oxyCODONE-acetaminophen (PERCOCET) 5-325 mg per tablet Take 1 tablet by mouth every 6 (six) hours as needed for moderate pain for up to 3 days Max Daily Amount: 4 tablets, Starting Thu 4/26/2018, Until Sun 4/29/2018, Print         CONTINUE these medications which have NOT CHANGED    Details   indomethacin (INDOCIN) 50 mg capsule Take 50 mg by mouth 3 (three) times a day with meals, Historical Med      amLODIPine (NORVASC) 10 mg tablet Take 1 tablet (10 mg total) by mouth daily, Starting Thu 2/1/2018, Normal      escitalopram (LEXAPRO) 20 mg tablet Take 1 tablet (20 mg total) by mouth daily, Starting Thu 2/1/2018, Normal      lisinopril (ZESTRIL) 40 mg tablet Take 1 tablet (40 mg total) by mouth daily, Starting Thu 2/1/2018, Normal      metoprolol tartrate (LOPRESSOR) 100 mg tablet Take 1 tablet (100 mg total) by mouth every 12 (twelve) hours, Starting Thu 2/1/2018, Normal           No discharge procedures on file      ED Provider  Electronically Signed by           Concha Berman PA-C  05/04/18 1007

## 2018-11-29 ENCOUNTER — OFFICE VISIT (OUTPATIENT)
Dept: FAMILY MEDICINE CLINIC | Facility: HOSPITAL | Age: 53
End: 2018-11-29
Payer: MEDICARE

## 2018-11-29 VITALS
HEART RATE: 105 BPM | BODY MASS INDEX: 30.69 KG/M2 | TEMPERATURE: 97.2 F | WEIGHT: 166.8 LBS | SYSTOLIC BLOOD PRESSURE: 142 MMHG | HEIGHT: 62 IN | DIASTOLIC BLOOD PRESSURE: 82 MMHG

## 2018-11-29 DIAGNOSIS — R73.9 HYPERGLYCEMIA: ICD-10-CM

## 2018-11-29 DIAGNOSIS — M1A.00X0 IDIOPATHIC CHRONIC GOUT WITHOUT TOPHUS, UNSPECIFIED SITE: ICD-10-CM

## 2018-11-29 DIAGNOSIS — I10 ESSENTIAL HYPERTENSION: ICD-10-CM

## 2018-11-29 DIAGNOSIS — F33.42 RECURRENT MAJOR DEPRESSIVE DISORDER, IN FULL REMISSION (HCC): ICD-10-CM

## 2018-11-29 DIAGNOSIS — E78.5 DYSLIPIDEMIA: Primary | ICD-10-CM

## 2018-11-29 DIAGNOSIS — F32.A DEPRESSION, UNSPECIFIED DEPRESSION TYPE: ICD-10-CM

## 2018-11-29 DIAGNOSIS — M15.9 GENERALIZED OSTEOARTHRITIS: ICD-10-CM

## 2018-11-29 PROCEDURE — 99214 OFFICE O/P EST MOD 30 MIN: CPT | Performed by: FAMILY MEDICINE

## 2018-11-29 RX ORDER — INDOMETHACIN 50 MG/1
50 CAPSULE ORAL
Qty: 90 CAPSULE | Refills: 0 | Status: SHIPPED | OUTPATIENT
Start: 2018-11-29 | End: 2019-10-18 | Stop reason: SDUPTHER

## 2018-11-29 RX ORDER — ESCITALOPRAM OXALATE 20 MG/1
TABLET ORAL
Qty: 45 TABLET | Refills: 3 | Status: SHIPPED | OUTPATIENT
Start: 2018-11-29 | End: 2019-10-18 | Stop reason: SDUPTHER

## 2018-11-29 NOTE — ASSESSMENT & PLAN NOTE
Did not take his medication today  Will continue to monitor  Advised regarding dietary control of salt intake

## 2018-11-29 NOTE — PROGRESS NOTES
ASSESSMENT/PLAN:    Problem List Items Addressed This Visit        Cardiovascular and Mediastinum    Essential hypertension     Did not take his medication today  Will continue to monitor  Advised regarding dietary control of salt intake  Relevant Orders    Lipid panel    CBC    Comprehensive metabolic panel    TSH, 3rd generation with Free T4 reflex       Musculoskeletal and Integument    Generalized osteoarthritis     Stable  May continue with prn indocin  Other    Dyslipidemia - Primary     Needs update on labs  Hyperglycemia    Relevant Orders    Hemoglobin A1C    Idiopathic chronic gout     Needs update on uric acid  Discussed dietary control  To continue with prn indocin  Relevant Medications    indomethacin (INDOCIN) 50 mg capsule    Other Relevant Orders    Uric acid    Recurrent major depressive disorder, in full remission ([facility] [address] )     Stable  No si/hi  Appears to be doing well on current regimen  Relevant Medications    escitalopram (LEXAPRO) 20 mg tablet      Other Visit Diagnoses     Depression, unspecified depression type        Relevant Medications    escitalopram (LEXAPRO) 20 mg tablet                No Follow-up on file  To call our office if any concerns/questions at [phone]   ______________________________________________________________________          Patient is here for follow up of chronic conditions  Chief Complaint   Patient presents with    Follow-up       History of Present Illness:     1  MDD  Stable  On current dosing of lexapro  Feels okay  Having trouble due to his father's chronic illness  No si/hi  2  HTN  Did not take meds today  Usually doing okay with blood pressure  3  Hyperlipidemia  Has not been able to get labs done  4  Gout  Occasional flare up  Has found a few food items that flare his gout up  Controlled with prn use of indocin  5  IFG  Has not folllowed a diabetic diet   Minimal regular exercise  Review of Systems   Constitutional: Negative  Negative for activity change, appetite change, chills and diaphoresis  HENT: Negative for congestion and dental problem  Respiratory: Negative  Negative for apnea, chest tightness, shortness of breath and wheezing  Cardiovascular: Negative  Negative for chest pain, palpitations and leg swelling  Gastrointestinal: Negative  Negative for abdominal distention, abdominal pain, constipation, diarrhea and nausea  Genitourinary: Negative  Negative for difficulty urinating, dysuria and frequency  Social History     Social History    Marital status:      Spouse name: N/A    Number of children: N/A    Years of education: N/A     Occupational History    Not on file  Social History Main Topics    Smoking status: Never Smoker    Smokeless tobacco: Never Used    Alcohol use Yes      Comment: social    Drug use: No    Sexual activity: Not on file     Other Topics Concern    Not on file     Social History Narrative    No narrative on file               Allergies   Allergen Reactions    Lipitor [Atorvastatin] Other (See Comments) and Myalgia     myalgia    Hctz [Hydrochlorothiazide] Rash and Hives       Immunization History   Administered Date(s) Administered    Td (adult), adsorbed 01/01/2001    Tdap 02/01/2018       Vitals:    11/29/18 0957   BP: 142/82   Pulse: 105   Temp: (!) 97 2 °F (36 2 °C)     Body mass index is 31 01 kg/m²  Physical Exam   Constitutional: He is oriented to person, place, and time  He appears well-developed and well-nourished  HENT:   Head: Normocephalic and atraumatic  Eyes: Pupils are equal, round, and reactive to light  EOM are normal    Neck: Normal range of motion  Neck supple  Cardiovascular: Normal rate, regular rhythm and normal heart sounds  Pulmonary/Chest: Effort normal and breath sounds normal    Abdominal: Soft   Bowel sounds are normal    Neurological: He is alert and oriented to person, place, and time  Skin: Skin is warm and dry  Psychiatric: He has a normal mood and affect  His behavior is normal    Nursing note and vitals reviewed      Diabetic Foot Exam                Health Maintenance Due   Topic Date Due    CRC Screening: Colonoscopy  1965    INFLUENZA VACCINE  07/01/2018

## 2019-03-08 DIAGNOSIS — Z12.11 SCREENING FOR COLON CANCER: Primary | ICD-10-CM

## 2019-10-18 DIAGNOSIS — I10 ESSENTIAL HYPERTENSION: ICD-10-CM

## 2019-10-18 DIAGNOSIS — F32.A DEPRESSION, UNSPECIFIED DEPRESSION TYPE: ICD-10-CM

## 2019-10-18 DIAGNOSIS — M1A.00X0 IDIOPATHIC CHRONIC GOUT WITHOUT TOPHUS, UNSPECIFIED SITE: ICD-10-CM

## 2019-10-19 RX ORDER — AMLODIPINE BESYLATE 10 MG/1
10 TABLET ORAL DAILY
Qty: 90 TABLET | Refills: 1 | Status: SHIPPED | OUTPATIENT
Start: 2019-10-19 | End: 2019-11-07

## 2019-10-19 RX ORDER — LISINOPRIL 40 MG/1
40 TABLET ORAL DAILY
Qty: 90 TABLET | Refills: 1 | Status: SHIPPED | OUTPATIENT
Start: 2019-10-19 | End: 2019-11-07

## 2019-10-19 RX ORDER — METOPROLOL TARTRATE 100 MG/1
100 TABLET ORAL EVERY 12 HOURS SCHEDULED
Qty: 180 TABLET | Refills: 1 | Status: SHIPPED | OUTPATIENT
Start: 2019-10-19 | End: 2019-11-07

## 2019-10-19 RX ORDER — ESCITALOPRAM OXALATE 20 MG/1
TABLET ORAL
Qty: 135 TABLET | Refills: 1 | Status: SHIPPED | OUTPATIENT
Start: 2019-10-19 | End: 2019-11-07

## 2019-10-19 RX ORDER — INDOMETHACIN 50 MG/1
50 CAPSULE ORAL
Qty: 90 CAPSULE | Refills: 1 | Status: SHIPPED | OUTPATIENT
Start: 2019-10-19 | End: 2019-11-07

## 2019-11-07 ENCOUNTER — OFFICE VISIT (OUTPATIENT)
Dept: FAMILY MEDICINE CLINIC | Facility: HOSPITAL | Age: 54
End: 2019-11-07
Payer: MEDICARE

## 2019-11-07 VITALS
HEART RATE: 66 BPM | DIASTOLIC BLOOD PRESSURE: 100 MMHG | WEIGHT: 170 LBS | HEIGHT: 62 IN | BODY MASS INDEX: 31.28 KG/M2 | SYSTOLIC BLOOD PRESSURE: 142 MMHG | TEMPERATURE: 97 F

## 2019-11-07 DIAGNOSIS — M25.511 CHRONIC PAIN OF BOTH SHOULDERS: ICD-10-CM

## 2019-11-07 DIAGNOSIS — I10 ESSENTIAL HYPERTENSION: Primary | ICD-10-CM

## 2019-11-07 DIAGNOSIS — M25.512 CHRONIC PAIN OF BOTH SHOULDERS: ICD-10-CM

## 2019-11-07 DIAGNOSIS — G89.29 CHRONIC PAIN OF BOTH SHOULDERS: ICD-10-CM

## 2019-11-07 DIAGNOSIS — E66.9 OBESITY (BMI 30-39.9): ICD-10-CM

## 2019-11-07 DIAGNOSIS — M47.896 OTHER OSTEOARTHRITIS OF SPINE, LUMBAR REGION: ICD-10-CM

## 2019-11-07 DIAGNOSIS — L03.211 CELLULITIS OF FACE: ICD-10-CM

## 2019-11-07 DIAGNOSIS — Z13.1 SCREENING FOR DIABETES MELLITUS (DM): ICD-10-CM

## 2019-11-07 DIAGNOSIS — M1A.00X0 IDIOPATHIC CHRONIC GOUT WITHOUT TOPHUS, UNSPECIFIED SITE: ICD-10-CM

## 2019-11-07 DIAGNOSIS — E78.5 DYSLIPIDEMIA: ICD-10-CM

## 2019-11-07 DIAGNOSIS — F33.0 MILD EPISODE OF RECURRENT MAJOR DEPRESSIVE DISORDER (HCC): ICD-10-CM

## 2019-11-07 DIAGNOSIS — M15.9 GENERALIZED OSTEOARTHRITIS: ICD-10-CM

## 2019-11-07 PROCEDURE — 99214 OFFICE O/P EST MOD 30 MIN: CPT | Performed by: FAMILY MEDICINE

## 2019-11-07 RX ORDER — LISINOPRIL 40 MG/1
40 TABLET ORAL DAILY
Qty: 90 TABLET | Refills: 1 | Status: SHIPPED | OUTPATIENT
Start: 2019-11-07 | End: 2022-02-02 | Stop reason: SDUPTHER

## 2019-11-07 RX ORDER — INDOMETHACIN 50 MG/1
50 CAPSULE ORAL
Qty: 90 CAPSULE | Refills: 1 | Status: SHIPPED | OUTPATIENT
Start: 2019-11-07 | End: 2021-12-13 | Stop reason: HOSPADM

## 2019-11-07 RX ORDER — CEPHALEXIN 500 MG/1
500 CAPSULE ORAL EVERY 8 HOURS SCHEDULED
Qty: 21 CAPSULE | Refills: 0 | Status: SHIPPED | OUTPATIENT
Start: 2019-11-07 | End: 2019-11-14

## 2019-11-07 RX ORDER — DULOXETIN HYDROCHLORIDE 30 MG/1
30 CAPSULE, DELAYED RELEASE ORAL DAILY
Qty: 30 CAPSULE | Refills: 0 | Status: SHIPPED | OUTPATIENT
Start: 2019-11-07 | End: 2020-04-15

## 2019-11-07 RX ORDER — METOPROLOL TARTRATE 100 MG/1
100 TABLET ORAL EVERY 12 HOURS SCHEDULED
Qty: 180 TABLET | Refills: 1 | Status: SHIPPED | OUTPATIENT
Start: 2019-11-07 | End: 2021-12-13 | Stop reason: HOSPADM

## 2019-11-07 RX ORDER — AMLODIPINE BESYLATE 10 MG/1
10 TABLET ORAL DAILY
Qty: 90 TABLET | Refills: 1 | Status: SHIPPED | OUTPATIENT
Start: 2019-11-07 | End: 2022-02-02 | Stop reason: SDUPTHER

## 2019-11-07 NOTE — PATIENT INSTRUCTIONS

## 2019-11-07 NOTE — PROGRESS NOTES
Assessment and Plan:     Problem List Items Addressed This Visit     None           Preventive health issues were discussed with patient, and age appropriate screening tests were ordered as noted in patient's After Visit Summary  Personalized health advice and appropriate referrals for health education or preventive services given if needed, as noted in patient's After Visit Summary       History of Present Illness:     Patient presents for Medicare Annual Wellness visit    Patient Care Team:  Yuval Vail MD as PCP - Ernestene Cousin, DO Evonne Rang, MD Marysue Nissen, MD Dwayne Songster, MD     Problem List:     Patient Active Problem List   Diagnosis    Chronic back pain    Degenerative joint disease (DJD) of lumbar spine    Recurrent major depressive disorder, in full remission ([address] )    Dyslipidemia    Essential hypertension    Generalized osteoarthritis    Idiopathic chronic gout    Hyperglycemia    Hyperlipidemia    Injury of wrist, left    Knee osteoarthritis    Lumbar radiculopathy    Osteoarthritis of left wrist    Polyarthralgia    Primary localized osteoarthritis of right knee    Rotator cuff disorder    Sacral pain    Spondylolisthesis of lumbar region    Tear of infraspinatus tendon, right, sequela    Chronic pain of both shoulders    Other male erectile dysfunction      Past Medical and Surgical History:     Past Medical History:   Diagnosis Date    Allergic rhinitis     last assessed 5/15/12; resolved 9/17/14    Anxiety     Bell's palsy     last assessed 10/2/15; resolved 10/2/15    Depression     DJD (degenerative joint disease)     Fracture lumbar vertebra-closed ([address] )     last assessed 12/30/14; resolved 12/30/14    Gout     Hypertension     Knee osteoarthritis     Malignant renovascular hypertension      Past Surgical History:   Procedure Laterality Date    KNEE ARTHROSCOPY Bilateral     SHOULDER SURGERY  12/2009    arthroscopy      Family History:     Family History   Problem Relation Age of Onset    Hypertension Father       Social History:     Social History     Socioeconomic History    Marital status:      Spouse name: None    Number of children: None    Years of education: None    Highest education level: None   Occupational History    None   Social Needs    Financial resource strain: None    Food insecurity:     Worry: None     Inability: None    Transportation needs:     Medical: None     Non-medical: None   Tobacco Use    Smoking status: Never Smoker    Smokeless tobacco: Never Used   Substance and Sexual Activity    Alcohol use: Yes     Comment: social    Drug use: No    Sexual activity: None   Lifestyle    Physical activity:     Days per week: None     Minutes per session: None    Stress: None   Relationships    Social connections:     Talks on phone: None     Gets together: None     Attends Gnosticist service: None     Active member of club or organization: None     Attends meetings of clubs or organizations: None     Relationship status: None    Intimate partner violence:     Fear of current or ex partner: None     Emotionally abused: None     Physically abused: None     Forced sexual activity: None   Other Topics Concern    None   Social History Narrative    None       Medications and Allergies:     Current Outpatient Medications   Medication Sig Dispense Refill    ALLOPURINOL PO Take by mouth      amLODIPine (NORVASC) 10 mg tablet Take 1 tablet (10 mg total) by mouth daily 90 tablet 1    escitalopram (LEXAPRO) 20 mg tablet Take 1 1/2 tab by mouth daily 135 tablet 1    indomethacin (INDOCIN) 50 mg capsule Take 1 capsule (50 mg total) by mouth 3 (three) times a day with meals 90 capsule 1    lisinopril (ZESTRIL) 40 mg tablet Take 1 tablet (40 mg total) by mouth daily 90 tablet 1    metoprolol tartrate (LOPRESSOR) 100 mg tablet Take 1 tablet (100 mg total) by mouth every 12 (twelve) hours 180 tablet 1     No current facility-administered medications for this visit  Allergies   Allergen Reactions    Lipitor [Atorvastatin] Other (See Comments) and Myalgia     myalgia    Hctz [Hydrochlorothiazide] Rash and Hives      Immunizations:     Immunization History   Administered Date(s) Administered    Td (adult), adsorbed 01/01/2001    Tdap 02/01/2018      Health Maintenance:         Topic Date Due    CRC Screening: Colonoscopy  1965         Topic Date Due    INFLUENZA VACCINE  07/01/2019      Medicare Health Risk Assessment:     /100   Pulse 66   Temp (!) 97 °F (36 1 °C)   Ht 5' 2" (1 575 m)   Wt 77 1 kg (170 lb)   BMI 31 09 kg/m²      Christine Alcantar is here for his Subsequent Wellness visit  Health Risk Assessment:   Patient rates overall health as fair  Patient feels that their physical health rating is slightly worse  Eyesight was rated as slightly worse  Hearing was rated as slightly worse  Patient feels that their emotional and mental health rating is slightly worse  Pain experienced in the last 7 days has been a lot  Patient's pain rating has been 8/10  Patient states that he has experienced no weight loss or gain in last 6 months  Depression Screening:   PHQ-2 Score: 6  PHQ-9 Score: 15      Fall Risk Screening: In the past year, patient has experienced: history of falling in past year    Number of falls: 2 or more  Injured during fall?: Yes      Home Safety:  Patient does not have trouble with stairs inside or outside of their home  Patient has working smoke alarms and has working carbon monoxide detector  Home safety hazards include: none  Nutrition:   Current diet is Regular and Limited junk food  Medications:   Patient is not currently taking any over-the-counter supplements  Patient is able to manage medications       Activities of Daily Living (ADLs)/Instrumental Activities of Daily Living (IADLs):   Walk and transfer into and out of bed and chair?: Yes  Dress and groom yourself?: Yes    Bathe or shower yourself?: Yes    Feed yourself? Yes  Do your laundry/housekeeping?: Yes  Manage your money, pay your bills and track your expenses?: Yes  Make your own meals?: Yes    Do your own shopping?: Yes    Previous Hospitalizations:   Any hospitalizations or ED visits within the last 12 months?: No      Advance Care Planning:   Living will: Yes    Durable POA for healthcare:  Yes    Advanced directive: Yes    Advanced directive counseling given: Yes      Cognitive Screening:   Provider or family/friend/caregiver concerned regarding cognition?: No    PREVENTIVE SCREENINGS      Cardiovascular Screening:    General: Screening Not Indicated and History Lipid Disorder      Diabetes Screening:     General: Screening Current      Colorectal Cancer Screening:     General: Patient Declines      Prostate Cancer Screening:    General: Risks and Benefits Discussed      Osteoporosis Screening:    General: Screening Not Indicated      Abdominal Aortic Aneurysm (AAA) Screening:        General: Screening Not Indicated      Lung Cancer Screening:     General: Screening Not Indicated      Hepatitis C Screening:    General: Risks and Benefits Discussed      Markos Mueller MD

## 2019-11-09 LAB
ALBUMIN SERPL-MCNC: 4.1 G/DL (ref 3.5–5.5)
ALBUMIN/GLOB SERPL: 1.4 {RATIO} (ref 1.2–2.2)
ALP SERPL-CCNC: 83 IU/L (ref 39–117)
ALT SERPL-CCNC: 15 IU/L (ref 0–44)
AST SERPL-CCNC: 17 IU/L (ref 0–40)
BILIRUB SERPL-MCNC: <0.2 MG/DL (ref 0–1.2)
BUN SERPL-MCNC: 18 MG/DL (ref 6–24)
BUN/CREAT SERPL: 23 (ref 9–20)
CALCIUM SERPL-MCNC: 9.3 MG/DL (ref 8.7–10.2)
CHLORIDE SERPL-SCNC: 100 MMOL/L (ref 96–106)
CHOLEST SERPL-MCNC: 207 MG/DL (ref 100–199)
CO2 SERPL-SCNC: 26 MMOL/L (ref 20–29)
CREAT SERPL-MCNC: 0.8 MG/DL (ref 0.76–1.27)
ERYTHROCYTE [DISTWIDTH] IN BLOOD BY AUTOMATED COUNT: 14.6 % (ref 12.3–15.4)
EST. AVERAGE GLUCOSE BLD GHB EST-MCNC: 120 MG/DL
GLOBULIN SER-MCNC: 3 G/DL (ref 1.5–4.5)
GLUCOSE SERPL-MCNC: 104 MG/DL (ref 65–99)
HBA1C MFR BLD: 5.8 % (ref 4.8–5.6)
HCT VFR BLD AUTO: 42 % (ref 37.5–51)
HDLC SERPL-MCNC: 54 MG/DL
HGB BLD-MCNC: 13.8 G/DL (ref 13–17.7)
LDLC SERPL CALC-MCNC: 132 MG/DL (ref 0–99)
LDLC/HDLC SERPL: 2.4 RATIO (ref 0–3.6)
MAGNESIUM SERPL-MCNC: 1.8 MG/DL (ref 1.6–2.3)
MCH RBC QN AUTO: 28.2 PG (ref 26.6–33)
MCHC RBC AUTO-ENTMCNC: 32.9 G/DL (ref 31.5–35.7)
MCV RBC AUTO: 86 FL (ref 79–97)
PLATELET # BLD AUTO: 282 X10E3/UL (ref 150–450)
POTASSIUM SERPL-SCNC: 4.6 MMOL/L (ref 3.5–5.2)
PROT SERPL-MCNC: 7.1 G/DL (ref 6–8.5)
RBC # BLD AUTO: 4.89 X10E6/UL (ref 4.14–5.8)
SL AMB EGFR AFRICAN AMERICAN: 118 ML/MIN/1.73
SL AMB EGFR NON AFRICAN AMERICAN: 102 ML/MIN/1.73
SL AMB VLDL CHOLESTEROL CALC: 21 MG/DL (ref 5–40)
SODIUM SERPL-SCNC: 139 MMOL/L (ref 134–144)
TRIGL SERPL-MCNC: 107 MG/DL (ref 0–149)
TSH SERPL DL<=0.005 MIU/L-ACNC: 2.83 UIU/ML (ref 0.45–4.5)
URATE SERPL-MCNC: 6.8 MG/DL (ref 3.7–8.6)
WBC # BLD AUTO: 5.7 X10E3/UL (ref 3.4–10.8)

## 2019-11-13 NOTE — ASSESSMENT & PLAN NOTE
Patient with chronic pain  Appears to me that this is multifactorial   He does carry diagnosis of osteoarthritis  He also has gouty arthritis  Multiple sites of chronic pain to include chronic low back pain  He has chronic pain of his shoulders and knees  But mostly low back pain  No improvement with using Indocin 3 times a day  At this point will refer to Pain Management  This is mostly for the low back pain  Patient has a comorbidity of depression  Will give a trial of Cymbalta  Will stop Lexapro    No suicidal ideation or homicidal ideation

## 2019-11-13 NOTE — PROGRESS NOTES
Assessment/Plan:      Problem List Items Addressed This Visit        Cardiovascular and Mediastinum    Essential hypertension - Primary    Relevant Medications    lisinopril (ZESTRIL) 40 mg tablet    metoprolol tartrate (LOPRESSOR) 100 mg tablet    amLODIPine (NORVASC) 10 mg tablet    Other Relevant Orders    CBC (Completed)    Comprehensive metabolic panel (Completed)    Lipid Panel with Direct LDL reflex (Completed)    TSH, 3rd generation with Free T4 reflex (Completed)    Magnesium (Completed)       Musculoskeletal and Integument    Degenerative joint disease (DJD) of lumbar spine    Relevant Medications    DULoxetine (CYMBALTA) 30 mg delayed release capsule    Other Relevant Orders    Ambulatory referral to Pain Management    Generalized osteoarthritis       Other    Chronic pain of both shoulders    Dyslipidemia    Idiopathic chronic gout    Relevant Medications    indomethacin (INDOCIN) 50 mg capsule    Other Relevant Orders    Uric acid (Completed)    Recurrent major depressive disorder, in full remission (HCC)    Relevant Medications    DULoxetine (CYMBALTA) 30 mg delayed release capsule      Other Visit Diagnoses     Screening for diabetes mellitus (DM)        Relevant Orders    Hemoglobin A1C (Completed)    TSH, 3rd generation with Free T4 reflex (Completed)    Cellulitis of face        Mild cellulitis on the upper lip  Trial of Keflex  Will monitor  Relevant Medications    cephalexin (KEFLEX) 500 mg capsule             BMI Counseling: Body mass index is 31 09 kg/m²  The BMI is above normal  Nutrition recommendations include decreasing portion sizes, encouraging healthy choices of fruits and vegetables, decreasing fast food intake and moderation in carbohydrate intake  Exercise recommendations include moderate physical activity 150 minutes/week                Subjective:   Chief Complaint   Patient presents with    Follow-up     med check   [facility] Wellness Visit     Subsequent         Patient ID: Audie Perez is a 49 y o  male  Patient is here for follow-up of chronic conditions  1  Patient with multiple joint pains  Reports pain in the shoulders  Reports pain in the knees  These are all chronic  He uses in the center periodically  For this  Mostly has been dealing with a lot of low back pain  No sciatica  No numbness or tingling  No pelvic numbness  No incontinence  2  Depression  Worsening feelings of depression  Guilt feelings  Feelings of sadness  No interest and with anhedonia  Currently on Lexapro  3  Hypertension  Has been doing okay  However has been out of medicine for several days  4  Because of a  had had shards of glass on his body  Most notably today he notes he had a few on the upper lip  This seems to be coming out  However it is tender and red today  He has no fever or chills  No discharge  The following portions of the patient's history were reviewed and updated as appropriate: allergies, current medications, past family history, past medical history, past social history, past surgical history and problem list     Review of Systems   Constitutional: Negative  Negative for activity change, appetite change, chills and diaphoresis  HENT: Negative for congestion and dental problem  Respiratory: Negative  Negative for apnea, chest tightness, shortness of breath and wheezing  Cardiovascular: Negative  Negative for chest pain, palpitations and leg swelling  Gastrointestinal: Negative  Negative for abdominal distention, abdominal pain, constipation, diarrhea and nausea  Genitourinary: Negative  Negative for difficulty urinating, dysuria and frequency           Objective:  Vitals:    11/07/19 1434   BP: 142/100   Pulse: 66   Temp: (!) 97 °F (36 1 °C)   Weight: 77 1 kg (170 lb)   Height: 5' 2" (1 575 m)     BP Readings from Last 3 Encounters:   11/07/19 142/100   11/29/18 142/82   04/26/18 154/92      Wt Readings from Last 3 Encounters:   11/07/19 77 1 kg (170 lb)   11/29/18 75 7 kg (166 lb 12 8 oz)   04/26/18 67 1 kg (148 lb)        Office Visit on 11/07/2019   Component Date Value Ref Range Status    Uric Acid 11/08/2019 6 8  3 7 - 8 6 mg/dL Final               Therapeutic target for gout patients: <6 0    White Blood Cell Count 11/08/2019 5 7  3 4 - 10 8 x10E3/uL Final    Red Blood Cell Count 11/08/2019 4 89  4 14 - 5 80 x10E6/uL Final    Hemoglobin 11/08/2019 13 8  13 0 - 17 7 g/dL Final    HCT 11/08/2019 42 0  37 5 - 51 0 % Final    MCV 11/08/2019 86  79 - 97 fL Final    MCH 11/08/2019 28 2  26 6 - 33 0 pg Final    MCHC 11/08/2019 32 9  31 5 - 35 7 g/dL Final    RDW 11/08/2019 14 6  12 3 - 15 4 % Final    Platelet Count 86/07/9060 282  150 - 450 x10E3/uL Final    Glucose, Random 11/08/2019 104* 65 - 99 mg/dL Final    BUN 11/08/2019 18  6 - 24 mg/dL Final    Creatinine 11/08/2019 0 80  0 76 - 1 27 mg/dL Final    eGFR Non  11/08/2019 102  >59 mL/min/1 73 Final    eGFR  11/08/2019 118  >59 mL/min/1 73 Final    SL AMB BUN/CREATININE RATIO 11/08/2019 23* 9 - 20 Final    Sodium 11/08/2019 139  134 - 144 mmol/L Final    Potassium 11/08/2019 4 6  3 5 - 5 2 mmol/L Final    Chloride 11/08/2019 100  96 - 106 mmol/L Final    CO2 11/08/2019 26  20 - 29 mmol/L Final    CALCIUM 11/08/2019 9 3  8 7 - 10 2 mg/dL Final    Protein, Total 11/08/2019 7 1  6 0 - 8 5 g/dL Final    Albumin 11/08/2019 4 1  3 5 - 5 5 g/dL Final    Globulin, Total 11/08/2019 3 0  1 5 - 4 5 g/dL Final    Albumin/Globulin Ratio 11/08/2019 1 4  1 2 - 2 2 Final    TOTAL BILIRUBIN 11/08/2019 <0 2  0 0 - 1 2 mg/dL Final    Alk Phos Isoenzymes 11/08/2019 83  39 - 117 IU/L Final    AST 11/08/2019 17  0 - 40 IU/L Final    ALT 11/08/2019 15  0 - 44 IU/L Final    Hemoglobin A1C 11/08/2019 5 8* 4 8 - 5 6 % Final    Comment:          Prediabetes: 5 7 - 6 4           Diabetes: >6 4           Glycemic control for adults with diabetes: <7 0      Estimated Average Glucose 11/08/2019 120  mg/dL Final    Cholesterol, Total 11/08/2019 207* 100 - 199 mg/dL Final    Triglycerides 11/08/2019 107  0 - 149 mg/dL Final    HDL 11/08/2019 54  >39 mg/dL Final    VLDL Cholesterol Calculated 11/08/2019 21  5 - 40 mg/dL Final    LDL Direct 11/08/2019 132* 0 - 99 mg/dL Final    LDl/HDL Ratio 11/08/2019 2 4  0 0 - 3 6 ratio Final    Comment:                                     LDL/HDL Ratio                                              Men  Women                                1/2 Avg  Risk  1 0    1 5                                    Avg Risk  3 6    3 2                                 2X Avg  Risk  6 2    5 0                                 3X Avg  Risk  8 0    6 1      TSH 11/08/2019 2 830  0 450 - 4 500 uIU/mL Final    Magnesium, Serum 11/08/2019 1 8  1 6 - 2 3 mg/dL Final   ]       Physical Exam   Constitutional: He is oriented to person, place, and time  He appears well-developed and well-nourished  No distress  HENT:   Head: Normocephalic and atraumatic  Right Ear: External ear normal    Left Ear: External ear normal    Nose: Nose normal    Mouth/Throat: Oropharynx is clear and moist    Eyes: Pupils are equal, round, and reactive to light  Conjunctivae and EOM are normal    Neck: Normal range of motion  Neck supple  Cardiovascular: Normal rate, regular rhythm and normal heart sounds  Exam reveals no gallop and no friction rub  No murmur heard  Pulmonary/Chest: Effort normal and breath sounds normal  No respiratory distress  He has no wheezes  He has no rales  He exhibits no tenderness  Abdominal: Soft  Bowel sounds are normal  He exhibits no distension and no mass  There is no tenderness  There is no rebound and no guarding  Genitourinary: Penis normal    Musculoskeletal: Normal range of motion  Neurological: He is alert and oriented to person, place, and time  Skin: Skin is warm  Upper lip there is slight tenderness  Mild erythema  Swelling noted  No foreign body noted  Psychiatric: He has a normal mood and affect  His behavior is normal  Judgment and thought content normal    Nursing note and vitals reviewed

## 2020-04-13 DIAGNOSIS — M47.896 OTHER OSTEOARTHRITIS OF SPINE, LUMBAR REGION: ICD-10-CM

## 2020-04-15 RX ORDER — DULOXETIN HYDROCHLORIDE 30 MG/1
CAPSULE, DELAYED RELEASE ORAL
Qty: 30 CAPSULE | Refills: 0 | Status: SHIPPED | OUTPATIENT
Start: 2020-04-15 | End: 2022-02-02 | Stop reason: SDUPTHER

## 2021-12-08 ENCOUNTER — APPOINTMENT (EMERGENCY)
Dept: CT IMAGING | Facility: HOSPITAL | Age: 56
DRG: 065 | End: 2021-12-08
Payer: MEDICARE

## 2021-12-08 ENCOUNTER — APPOINTMENT (INPATIENT)
Dept: MRI IMAGING | Facility: HOSPITAL | Age: 56
DRG: 065 | End: 2021-12-08
Payer: MEDICARE

## 2021-12-08 ENCOUNTER — HOSPITAL ENCOUNTER (INPATIENT)
Facility: HOSPITAL | Age: 56
LOS: 5 days | DRG: 065 | End: 2021-12-13
Attending: EMERGENCY MEDICINE | Admitting: INTERNAL MEDICINE
Payer: MEDICARE

## 2021-12-08 DIAGNOSIS — M1A.00X0 IDIOPATHIC CHRONIC GOUT WITHOUT TOPHUS, UNSPECIFIED SITE: ICD-10-CM

## 2021-12-08 DIAGNOSIS — I63.9 ACUTE CVA (CEREBROVASCULAR ACCIDENT) (HCC): ICD-10-CM

## 2021-12-08 DIAGNOSIS — R29.90 STROKE-LIKE SYMPTOMS: Primary | ICD-10-CM

## 2021-12-08 DIAGNOSIS — I10 ESSENTIAL HYPERTENSION: ICD-10-CM

## 2021-12-08 LAB
2HR DELTA HS TROPONIN: 0 NG/L
ANION GAP SERPL CALCULATED.3IONS-SCNC: 11 MMOL/L (ref 4–13)
APTT PPP: 27 SECONDS (ref 23–37)
ATRIAL RATE: 83 BPM
BUN SERPL-MCNC: 19 MG/DL (ref 5–25)
CALCIUM SERPL-MCNC: 9 MG/DL (ref 8.3–10.1)
CARDIAC TROPONIN I PNL SERPL HS: 8 NG/L
CARDIAC TROPONIN I PNL SERPL HS: 8 NG/L
CHLORIDE SERPL-SCNC: 100 MMOL/L (ref 100–108)
CO2 SERPL-SCNC: 26 MMOL/L (ref 21–32)
CREAT SERPL-MCNC: 1.01 MG/DL (ref 0.6–1.3)
ERYTHROCYTE [DISTWIDTH] IN BLOOD BY AUTOMATED COUNT: 13.2 % (ref 11.6–15.1)
GFR SERPL CREATININE-BSD FRML MDRD: 83 ML/MIN/1.73SQ M
GLUCOSE SERPL-MCNC: 107 MG/DL (ref 65–140)
GLUCOSE SERPL-MCNC: 131 MG/DL (ref 65–140)
HCT VFR BLD AUTO: 41.9 % (ref 36.5–49.3)
HGB BLD-MCNC: 14 G/DL (ref 12–17)
INR PPP: 0.99 (ref 0.84–1.19)
MCH RBC QN AUTO: 27.7 PG (ref 26.8–34.3)
MCHC RBC AUTO-ENTMCNC: 33.4 G/DL (ref 31.4–37.4)
MCV RBC AUTO: 83 FL (ref 82–98)
P AXIS: 46 DEGREES
PLATELET # BLD AUTO: 282 THOUSANDS/UL (ref 149–390)
PMV BLD AUTO: 9.2 FL (ref 8.9–12.7)
POTASSIUM SERPL-SCNC: 3.8 MMOL/L (ref 3.5–5.3)
PR INTERVAL: 156 MS
PROTHROMBIN TIME: 13 SECONDS (ref 11.6–14.5)
QRS AXIS: -25 DEGREES
QRSD INTERVAL: 96 MS
QT INTERVAL: 396 MS
QTC INTERVAL: 465 MS
RBC # BLD AUTO: 5.06 MILLION/UL (ref 3.88–5.62)
SODIUM SERPL-SCNC: 137 MMOL/L (ref 136–145)
T WAVE AXIS: 37 DEGREES
VENTRICULAR RATE: 83 BPM
WBC # BLD AUTO: 6.39 THOUSAND/UL (ref 4.31–10.16)

## 2021-12-08 PROCEDURE — 99223 1ST HOSP IP/OBS HIGH 75: CPT | Performed by: INTERNAL MEDICINE

## 2021-12-08 PROCEDURE — 84484 ASSAY OF TROPONIN QUANT: CPT | Performed by: PHYSICIAN ASSISTANT

## 2021-12-08 PROCEDURE — 96374 THER/PROPH/DIAG INJ IV PUSH: CPT

## 2021-12-08 PROCEDURE — 85730 THROMBOPLASTIN TIME PARTIAL: CPT | Performed by: PHYSICIAN ASSISTANT

## 2021-12-08 PROCEDURE — 93010 ELECTROCARDIOGRAM REPORT: CPT | Performed by: INTERNAL MEDICINE

## 2021-12-08 PROCEDURE — G1004 CDSM NDSC: HCPCS

## 2021-12-08 PROCEDURE — 99285 EMERGENCY DEPT VISIT HI MDM: CPT | Performed by: PHYSICIAN ASSISTANT

## 2021-12-08 PROCEDURE — 70551 MRI BRAIN STEM W/O DYE: CPT

## 2021-12-08 PROCEDURE — 82948 REAGENT STRIP/BLOOD GLUCOSE: CPT

## 2021-12-08 PROCEDURE — 85027 COMPLETE CBC AUTOMATED: CPT | Performed by: PHYSICIAN ASSISTANT

## 2021-12-08 PROCEDURE — 99223 1ST HOSP IP/OBS HIGH 75: CPT | Performed by: PSYCHIATRY & NEUROLOGY

## 2021-12-08 PROCEDURE — 70496 CT ANGIOGRAPHY HEAD: CPT

## 2021-12-08 PROCEDURE — 93005 ELECTROCARDIOGRAM TRACING: CPT

## 2021-12-08 PROCEDURE — 70498 CT ANGIOGRAPHY NECK: CPT

## 2021-12-08 PROCEDURE — 36415 COLL VENOUS BLD VENIPUNCTURE: CPT | Performed by: PHYSICIAN ASSISTANT

## 2021-12-08 PROCEDURE — 85610 PROTHROMBIN TIME: CPT | Performed by: PHYSICIAN ASSISTANT

## 2021-12-08 PROCEDURE — 80048 BASIC METABOLIC PNL TOTAL CA: CPT | Performed by: PHYSICIAN ASSISTANT

## 2021-12-08 PROCEDURE — 99285 EMERGENCY DEPT VISIT HI MDM: CPT

## 2021-12-08 RX ORDER — LABETALOL 20 MG/4 ML (5 MG/ML) INTRAVENOUS SYRINGE
20 EVERY 6 HOURS PRN
Status: DISCONTINUED | OUTPATIENT
Start: 2021-12-08 | End: 2021-12-13 | Stop reason: HOSPADM

## 2021-12-08 RX ORDER — DULOXETIN HYDROCHLORIDE 30 MG/1
30 CAPSULE, DELAYED RELEASE ORAL DAILY
Status: DISCONTINUED | OUTPATIENT
Start: 2021-12-08 | End: 2021-12-13 | Stop reason: HOSPADM

## 2021-12-08 RX ORDER — ASPIRIN 325 MG
325 TABLET ORAL ONCE
Status: COMPLETED | OUTPATIENT
Start: 2021-12-08 | End: 2021-12-08

## 2021-12-08 RX ORDER — ASPIRIN 81 MG/1
81 TABLET, CHEWABLE ORAL DAILY
Status: DISCONTINUED | OUTPATIENT
Start: 2021-12-09 | End: 2021-12-13 | Stop reason: HOSPADM

## 2021-12-08 RX ORDER — LABETALOL 20 MG/4 ML (5 MG/ML) INTRAVENOUS SYRINGE
10 ONCE
Status: COMPLETED | OUTPATIENT
Start: 2021-12-08 | End: 2021-12-08

## 2021-12-08 RX ORDER — ONDANSETRON 2 MG/ML
4 INJECTION INTRAMUSCULAR; INTRAVENOUS EVERY 6 HOURS PRN
Status: DISCONTINUED | OUTPATIENT
Start: 2021-12-08 | End: 2021-12-13 | Stop reason: HOSPADM

## 2021-12-08 RX ORDER — LORAZEPAM 2 MG/ML
0.5 INJECTION INTRAMUSCULAR ONCE
Status: COMPLETED | OUTPATIENT
Start: 2021-12-08 | End: 2021-12-08

## 2021-12-08 RX ORDER — ACETAMINOPHEN 325 MG/1
650 TABLET ORAL EVERY 4 HOURS PRN
Status: DISCONTINUED | OUTPATIENT
Start: 2021-12-08 | End: 2021-12-13 | Stop reason: HOSPADM

## 2021-12-08 RX ORDER — CLOPIDOGREL BISULFATE 75 MG/1
75 TABLET ORAL DAILY
Status: DISCONTINUED | OUTPATIENT
Start: 2021-12-08 | End: 2021-12-13 | Stop reason: HOSPADM

## 2021-12-08 RX ORDER — ALLOPURINOL 100 MG/1
100 TABLET ORAL DAILY
Status: DISCONTINUED | OUTPATIENT
Start: 2021-12-08 | End: 2021-12-13 | Stop reason: HOSPADM

## 2021-12-08 RX ORDER — ASPIRIN 300 MG/1
300 SUPPOSITORY RECTAL ONCE
Status: COMPLETED | OUTPATIENT
Start: 2021-12-08 | End: 2021-12-08

## 2021-12-08 RX ADMIN — LORAZEPAM 0.5 MG: 2 INJECTION INTRAMUSCULAR; INTRAVENOUS at 18:25

## 2021-12-08 RX ADMIN — ALLOPURINOL 100 MG: 100 TABLET ORAL at 15:26

## 2021-12-08 RX ADMIN — IOHEXOL 85 ML: 350 INJECTION, SOLUTION INTRAVENOUS at 10:56

## 2021-12-08 RX ADMIN — DULOXETINE 30 MG: 30 CAPSULE, DELAYED RELEASE ORAL at 15:26

## 2021-12-08 RX ADMIN — CLOPIDOGREL BISULFATE 75 MG: 75 TABLET ORAL at 12:04

## 2021-12-08 RX ADMIN — ASPIRIN 325 MG ORAL TABLET 325 MG: 325 PILL ORAL at 11:33

## 2021-12-08 RX ADMIN — LABETALOL HYDROCHLORIDE 20 MG: 5 INJECTION, SOLUTION INTRAVENOUS at 20:09

## 2021-12-08 RX ADMIN — LABETALOL HYDROCHLORIDE 10 MG: 5 INJECTION, SOLUTION INTRAVENOUS at 11:20

## 2021-12-09 ENCOUNTER — ANESTHESIA EVENT (INPATIENT)
Dept: NON INVASIVE DIAGNOSTICS | Facility: HOSPITAL | Age: 56
DRG: 065 | End: 2021-12-09
Payer: MEDICARE

## 2021-12-09 PROBLEM — I63.9 ACUTE ISCHEMIC STROKE (HCC): Status: ACTIVE | Noted: 2021-12-08

## 2021-12-09 PROBLEM — I63.532: Status: ACTIVE | Noted: 2021-12-08

## 2021-12-09 LAB
ALBUMIN SERPL BCP-MCNC: 3.5 G/DL (ref 3.5–5)
ALP SERPL-CCNC: 81 U/L (ref 46–116)
ALT SERPL W P-5'-P-CCNC: 30 U/L (ref 12–78)
ANION GAP SERPL CALCULATED.3IONS-SCNC: 11 MMOL/L (ref 4–13)
AORTIC ROOT: 3.9 CM
AORTIC VALVE MEAN VELOCITY: 6.7 M/S
APICAL FOUR CHAMBER EJECTION FRACTION: 75 %
ASCENDING AORTA: 3.6 CM
AST SERPL W P-5'-P-CCNC: 16 U/L (ref 5–45)
AV LVOT MEAN GRADIENT: 1 MMHG
AV LVOT PEAK GRADIENT: 2 MMHG
AV MEAN GRADIENT: 2 MMHG
AV PEAK GRADIENT: 4 MMHG
BASOPHILS # BLD AUTO: 0.04 THOUSANDS/ΜL (ref 0–0.1)
BASOPHILS NFR BLD AUTO: 1 % (ref 0–1)
BILIRUB SERPL-MCNC: 0.4 MG/DL (ref 0.2–1)
BUN SERPL-MCNC: 21 MG/DL (ref 5–25)
CALCIUM SERPL-MCNC: 9 MG/DL (ref 8.3–10.1)
CHLORIDE SERPL-SCNC: 104 MMOL/L (ref 100–108)
CHOLEST SERPL-MCNC: 216 MG/DL
CO2 SERPL-SCNC: 25 MMOL/L (ref 21–32)
CREAT SERPL-MCNC: 0.94 MG/DL (ref 0.6–1.3)
DOP CALC AO VTI: 18.37 CM
DOP CALC LVOT PEAK VEL VTI: 15.73 CM
DOP CALC LVOT PEAK VEL: 0.76 M/S
E WAVE DECELERATION TIME: 223 MS
EOSINOPHIL # BLD AUTO: 0.25 THOUSAND/ΜL (ref 0–0.61)
EOSINOPHIL NFR BLD AUTO: 5 % (ref 0–6)
ERYTHROCYTE [DISTWIDTH] IN BLOOD BY AUTOMATED COUNT: 13.5 % (ref 11.6–15.1)
EST. AVERAGE GLUCOSE BLD GHB EST-MCNC: 128 MG/DL
FRACTIONAL SHORTENING: 27 % (ref 28–44)
GFR SERPL CREATININE-BSD FRML MDRD: 91 ML/MIN/1.73SQ M
GLOBAL LONGITUIDAL STRAIN: -16 %
GLUCOSE SERPL-MCNC: 115 MG/DL (ref 65–140)
HBA1C MFR BLD: 6.1 %
HCT VFR BLD AUTO: 42.2 % (ref 36.5–49.3)
HDLC SERPL-MCNC: 51 MG/DL
HGB BLD-MCNC: 13.7 G/DL (ref 12–17)
IMM GRANULOCYTES # BLD AUTO: 0.02 THOUSAND/UL (ref 0–0.2)
IMM GRANULOCYTES NFR BLD AUTO: 0 % (ref 0–2)
INR PPP: 1.02 (ref 0.84–1.19)
INTERVENTRICULAR SEPTUM IN DIASTOLE (PARASTERNAL SHORT AXIS VIEW): 1.4 CM
LAAS-AP2: 22.7 CM2
LAAS-AP4: 23.5 CM2
LDLC SERPL CALC-MCNC: 146 MG/DL (ref 0–100)
LEFT INTERNAL DIMENSION IN SYSTOLE: 2.7 CM (ref 2.1–4)
LEFT VENTRICULAR INTERNAL DIMENSION IN DIASTOLE: 3.7 CM (ref 4.3–6.41)
LEFT VENTRICULAR POSTERIOR WALL IN END DIASTOLE: 1.6 CM
LEFT VENTRICULAR STROKE VOLUME: 66 ML
LYMPHOCYTES # BLD AUTO: 1.65 THOUSANDS/ΜL (ref 0.6–4.47)
LYMPHOCYTES NFR BLD AUTO: 31 % (ref 14–44)
MAGNESIUM SERPL-MCNC: 2.3 MG/DL (ref 1.6–2.6)
MCH RBC QN AUTO: 27.5 PG (ref 26.8–34.3)
MCHC RBC AUTO-ENTMCNC: 32.5 G/DL (ref 31.4–37.4)
MCV RBC AUTO: 85 FL (ref 82–98)
MONOCYTES # BLD AUTO: 0.46 THOUSAND/ΜL (ref 0.17–1.22)
MONOCYTES NFR BLD AUTO: 9 % (ref 4–12)
MV E'TISSUE VEL-LAT: 4 CM/S
MV E'TISSUE VEL-SEP: 5 CM/S
MV PEAK A VEL: 0.75 M/S
MV PEAK E VEL: 57 CM/S
MV STENOSIS PRESSURE HALF TIME: 0 MS
NEUTROPHILS # BLD AUTO: 2.97 THOUSANDS/ΜL (ref 1.85–7.62)
NEUTS SEG NFR BLD AUTO: 54 % (ref 43–75)
NRBC BLD AUTO-RTO: 0 /100 WBCS
PHOSPHATE SERPL-MCNC: 3.9 MG/DL (ref 2.7–4.5)
PLATELET # BLD AUTO: 249 THOUSANDS/UL (ref 149–390)
PMV BLD AUTO: 9.5 FL (ref 8.9–12.7)
POTASSIUM SERPL-SCNC: 4.3 MMOL/L (ref 3.5–5.3)
PROT SERPL-MCNC: 7.6 G/DL (ref 6.4–8.2)
PROTHROMBIN TIME: 13.3 SECONDS (ref 11.6–14.5)
RA PRESSURE ESTIMATED: 3 MMHG
RBC # BLD AUTO: 4.99 MILLION/UL (ref 3.88–5.62)
RIGHT ATRIAL 2D VOLUME: 44 ML
RIGHT ATRIUM AREA SYSTOLE A4C: 18.8 CM2
RIGHT VENTRICLE ID DIMENSION: 4 CM
SL CV LV EF: 65
SL CV PED ECHO LEFT VENTRICLE DIASTOLIC VOLUME (MOD BIPLANE) 2D: 93 ML
SL CV PED ECHO LEFT VENTRICLE SYSTOLIC VOLUME (MOD BIPLANE) 2D: 27 ML
SODIUM SERPL-SCNC: 140 MMOL/L (ref 136–145)
TRICUSPID VALVE S': 51 CM/S
TRIGL SERPL-MCNC: 97 MG/DL
WBC # BLD AUTO: 5.39 THOUSAND/UL (ref 4.31–10.16)
Z-SCORE OF LEFT VENTRICULAR DIMENSION IN END SYSTOLE: -3.5

## 2021-12-09 PROCEDURE — 99233 SBSQ HOSP IP/OBS HIGH 50: CPT | Performed by: PHYSICIAN ASSISTANT

## 2021-12-09 PROCEDURE — 93356 MYOCRD STRAIN IMG SPCKL TRCK: CPT

## 2021-12-09 PROCEDURE — 92523 SPEECH SOUND LANG COMPREHEN: CPT

## 2021-12-09 PROCEDURE — 84100 ASSAY OF PHOSPHORUS: CPT | Performed by: INTERNAL MEDICINE

## 2021-12-09 PROCEDURE — 85610 PROTHROMBIN TIME: CPT | Performed by: INTERNAL MEDICINE

## 2021-12-09 PROCEDURE — 83735 ASSAY OF MAGNESIUM: CPT | Performed by: INTERNAL MEDICINE

## 2021-12-09 PROCEDURE — 80061 LIPID PANEL: CPT | Performed by: INTERNAL MEDICINE

## 2021-12-09 PROCEDURE — 92610 EVALUATE SWALLOWING FUNCTION: CPT

## 2021-12-09 PROCEDURE — 93306 TTE W/DOPPLER COMPLETE: CPT

## 2021-12-09 PROCEDURE — 80053 COMPREHEN METABOLIC PANEL: CPT | Performed by: INTERNAL MEDICINE

## 2021-12-09 PROCEDURE — 85025 COMPLETE CBC W/AUTO DIFF WBC: CPT | Performed by: INTERNAL MEDICINE

## 2021-12-09 PROCEDURE — 93356 MYOCRD STRAIN IMG SPCKL TRCK: CPT | Performed by: INTERNAL MEDICINE

## 2021-12-09 PROCEDURE — 93306 TTE W/DOPPLER COMPLETE: CPT | Performed by: INTERNAL MEDICINE

## 2021-12-09 PROCEDURE — 99232 SBSQ HOSP IP/OBS MODERATE 35: CPT | Performed by: PSYCHIATRY & NEUROLOGY

## 2021-12-09 PROCEDURE — 83036 HEMOGLOBIN GLYCOSYLATED A1C: CPT | Performed by: INTERNAL MEDICINE

## 2021-12-09 RX ORDER — METOPROLOL TARTRATE 50 MG/1
100 TABLET, FILM COATED ORAL EVERY 12 HOURS SCHEDULED
Status: DISCONTINUED | OUTPATIENT
Start: 2021-12-09 | End: 2021-12-09

## 2021-12-09 RX ORDER — PRAVASTATIN SODIUM 10 MG
10 TABLET ORAL
Status: DISCONTINUED | OUTPATIENT
Start: 2021-12-09 | End: 2021-12-13 | Stop reason: HOSPADM

## 2021-12-09 RX ORDER — AMLODIPINE BESYLATE 5 MG/1
10 TABLET ORAL DAILY
Status: DISCONTINUED | OUTPATIENT
Start: 2021-12-09 | End: 2021-12-13 | Stop reason: HOSPADM

## 2021-12-09 RX ORDER — LISINOPRIL 20 MG/1
40 TABLET ORAL DAILY
Status: DISCONTINUED | OUTPATIENT
Start: 2021-12-09 | End: 2021-12-13 | Stop reason: HOSPADM

## 2021-12-09 RX ADMIN — ENOXAPARIN SODIUM 40 MG: 100 INJECTION SUBCUTANEOUS at 14:24

## 2021-12-09 RX ADMIN — PRAVASTATIN SODIUM 10 MG: 10 TABLET ORAL at 17:07

## 2021-12-09 RX ADMIN — CLOPIDOGREL BISULFATE 75 MG: 75 TABLET ORAL at 08:57

## 2021-12-09 RX ADMIN — LABETALOL HYDROCHLORIDE 20 MG: 5 INJECTION, SOLUTION INTRAVENOUS at 12:29

## 2021-12-09 RX ADMIN — ASPIRIN 81 MG CHEWABLE TABLET 81 MG: 81 TABLET CHEWABLE at 08:57

## 2021-12-09 RX ADMIN — DULOXETINE 30 MG: 30 CAPSULE, DELAYED RELEASE ORAL at 08:57

## 2021-12-09 RX ADMIN — ALLOPURINOL 100 MG: 100 TABLET ORAL at 08:57

## 2021-12-09 RX ADMIN — LISINOPRIL 40 MG: 20 TABLET ORAL at 14:24

## 2021-12-09 RX ADMIN — AMLODIPINE BESYLATE 10 MG: 5 TABLET ORAL at 14:24

## 2021-12-10 ENCOUNTER — APPOINTMENT (OUTPATIENT)
Dept: NON INVASIVE DIAGNOSTICS | Facility: HOSPITAL | Age: 56
DRG: 065 | End: 2021-12-10
Payer: MEDICARE

## 2021-12-10 LAB — AORTIC ROOT: 4.3 CM

## 2021-12-10 PROCEDURE — 93312 ECHO TRANSESOPHAGEAL: CPT

## 2021-12-10 PROCEDURE — 97167 OT EVAL HIGH COMPLEX 60 MIN: CPT

## 2021-12-10 PROCEDURE — 99232 SBSQ HOSP IP/OBS MODERATE 35: CPT | Performed by: PSYCHIATRY & NEUROLOGY

## 2021-12-10 PROCEDURE — 99232 SBSQ HOSP IP/OBS MODERATE 35: CPT | Performed by: INTERNAL MEDICINE

## 2021-12-10 PROCEDURE — B24BZZ4 ULTRASONOGRAPHY OF HEART WITH AORTA, TRANSESOPHAGEAL: ICD-10-PCS | Performed by: INTERNAL MEDICINE

## 2021-12-10 PROCEDURE — 97530 THERAPEUTIC ACTIVITIES: CPT

## 2021-12-10 PROCEDURE — 93325 DOPPLER ECHO COLOR FLOW MAPG: CPT | Performed by: INTERNAL MEDICINE

## 2021-12-10 PROCEDURE — 93320 DOPPLER ECHO COMPLETE: CPT | Performed by: INTERNAL MEDICINE

## 2021-12-10 PROCEDURE — 93312 ECHO TRANSESOPHAGEAL: CPT | Performed by: INTERNAL MEDICINE

## 2021-12-10 PROCEDURE — 97163 PT EVAL HIGH COMPLEX 45 MIN: CPT

## 2021-12-10 RX ORDER — LISINOPRIL 20 MG/1
40 TABLET ORAL DAILY
Status: DISCONTINUED | OUTPATIENT
Start: 2021-12-10 | End: 2021-12-10

## 2021-12-10 RX ORDER — GLYCOPYRROLATE 0.2 MG/ML
INJECTION INTRAMUSCULAR; INTRAVENOUS AS NEEDED
Status: DISCONTINUED | OUTPATIENT
Start: 2021-12-10 | End: 2021-12-10

## 2021-12-10 RX ORDER — SODIUM CHLORIDE, SODIUM LACTATE, POTASSIUM CHLORIDE, CALCIUM CHLORIDE 600; 310; 30; 20 MG/100ML; MG/100ML; MG/100ML; MG/100ML
INJECTION, SOLUTION INTRAVENOUS CONTINUOUS PRN
Status: DISCONTINUED | OUTPATIENT
Start: 2021-12-10 | End: 2021-12-10

## 2021-12-10 RX ORDER — AMLODIPINE BESYLATE 5 MG/1
10 TABLET ORAL DAILY
Status: DISCONTINUED | OUTPATIENT
Start: 2021-12-10 | End: 2021-12-10

## 2021-12-10 RX ORDER — LIDOCAINE HYDROCHLORIDE 10 MG/ML
INJECTION, SOLUTION EPIDURAL; INFILTRATION; INTRACAUDAL; PERINEURAL AS NEEDED
Status: DISCONTINUED | OUTPATIENT
Start: 2021-12-10 | End: 2021-12-10

## 2021-12-10 RX ORDER — METOPROLOL TARTRATE 50 MG/1
100 TABLET, FILM COATED ORAL EVERY 12 HOURS SCHEDULED
Status: DISCONTINUED | OUTPATIENT
Start: 2021-12-10 | End: 2021-12-12

## 2021-12-10 RX ORDER — PROPOFOL 10 MG/ML
INJECTION, EMULSION INTRAVENOUS AS NEEDED
Status: DISCONTINUED | OUTPATIENT
Start: 2021-12-10 | End: 2021-12-10

## 2021-12-10 RX ADMIN — LIDOCAINE HYDROCHLORIDE 100 MG: 10 INJECTION, SOLUTION EPIDURAL; INFILTRATION; INTRACAUDAL; PERINEURAL at 13:51

## 2021-12-10 RX ADMIN — LISINOPRIL 40 MG: 20 TABLET ORAL at 08:27

## 2021-12-10 RX ADMIN — AMLODIPINE BESYLATE 10 MG: 5 TABLET ORAL at 08:27

## 2021-12-10 RX ADMIN — PROPOFOL 100 MG: 10 INJECTION, EMULSION INTRAVENOUS at 14:00

## 2021-12-10 RX ADMIN — PRAVASTATIN SODIUM 10 MG: 10 TABLET ORAL at 16:29

## 2021-12-10 RX ADMIN — PROPOFOL 150 MG: 10 INJECTION, EMULSION INTRAVENOUS at 14:04

## 2021-12-10 RX ADMIN — GLYCOPYRROLATE 0.2 MG: 0.2 INJECTION, SOLUTION INTRAMUSCULAR; INTRAVENOUS at 13:49

## 2021-12-10 RX ADMIN — ENOXAPARIN SODIUM 40 MG: 100 INJECTION SUBCUTANEOUS at 16:29

## 2021-12-10 RX ADMIN — PROPOFOL 100 MG: 10 INJECTION, EMULSION INTRAVENOUS at 13:55

## 2021-12-10 RX ADMIN — ALLOPURINOL 100 MG: 100 TABLET ORAL at 08:26

## 2021-12-10 RX ADMIN — PROPOFOL 100 MG: 10 INJECTION, EMULSION INTRAVENOUS at 13:51

## 2021-12-10 RX ADMIN — METOPROLOL TARTRATE 100 MG: 50 TABLET, FILM COATED ORAL at 16:29

## 2021-12-10 RX ADMIN — DULOXETINE 30 MG: 30 CAPSULE, DELAYED RELEASE ORAL at 08:27

## 2021-12-10 RX ADMIN — CLOPIDOGREL BISULFATE 75 MG: 75 TABLET ORAL at 08:27

## 2021-12-10 RX ADMIN — SODIUM CHLORIDE, SODIUM LACTATE, POTASSIUM CHLORIDE, AND CALCIUM CHLORIDE: .6; .31; .03; .02 INJECTION, SOLUTION INTRAVENOUS at 13:36

## 2021-12-10 RX ADMIN — ASPIRIN 81 MG CHEWABLE TABLET 81 MG: 81 TABLET CHEWABLE at 08:27

## 2021-12-11 LAB
ANION GAP SERPL CALCULATED.3IONS-SCNC: 12 MMOL/L (ref 4–13)
BASOPHILS # BLD AUTO: 0.05 THOUSANDS/ΜL (ref 0–0.1)
BASOPHILS NFR BLD AUTO: 1 % (ref 0–1)
BUN SERPL-MCNC: 27 MG/DL (ref 5–25)
CALCIUM SERPL-MCNC: 8.8 MG/DL (ref 8.3–10.1)
CHLORIDE SERPL-SCNC: 106 MMOL/L (ref 100–108)
CO2 SERPL-SCNC: 23 MMOL/L (ref 21–32)
CREAT SERPL-MCNC: 0.93 MG/DL (ref 0.6–1.3)
EOSINOPHIL # BLD AUTO: 0.22 THOUSAND/ΜL (ref 0–0.61)
EOSINOPHIL NFR BLD AUTO: 3 % (ref 0–6)
ERYTHROCYTE [DISTWIDTH] IN BLOOD BY AUTOMATED COUNT: 13.3 % (ref 11.6–15.1)
GFR SERPL CREATININE-BSD FRML MDRD: 92 ML/MIN/1.73SQ M
GLUCOSE SERPL-MCNC: 92 MG/DL (ref 65–140)
HCT VFR BLD AUTO: 43.8 % (ref 36.5–49.3)
HGB BLD-MCNC: 14 G/DL (ref 12–17)
IMM GRANULOCYTES # BLD AUTO: 0.06 THOUSAND/UL (ref 0–0.2)
IMM GRANULOCYTES NFR BLD AUTO: 1 % (ref 0–2)
LYMPHOCYTES # BLD AUTO: 2.58 THOUSANDS/ΜL (ref 0.6–4.47)
LYMPHOCYTES NFR BLD AUTO: 31 % (ref 14–44)
MCH RBC QN AUTO: 27.2 PG (ref 26.8–34.3)
MCHC RBC AUTO-ENTMCNC: 32 G/DL (ref 31.4–37.4)
MCV RBC AUTO: 85 FL (ref 82–98)
MONOCYTES # BLD AUTO: 0.68 THOUSAND/ΜL (ref 0.17–1.22)
MONOCYTES NFR BLD AUTO: 8 % (ref 4–12)
NEUTROPHILS # BLD AUTO: 4.87 THOUSANDS/ΜL (ref 1.85–7.62)
NEUTS SEG NFR BLD AUTO: 56 % (ref 43–75)
NRBC BLD AUTO-RTO: 0 /100 WBCS
PLATELET # BLD AUTO: 281 THOUSANDS/UL (ref 149–390)
PMV BLD AUTO: 9.5 FL (ref 8.9–12.7)
POTASSIUM SERPL-SCNC: 4.3 MMOL/L (ref 3.5–5.3)
RBC # BLD AUTO: 5.14 MILLION/UL (ref 3.88–5.62)
SODIUM SERPL-SCNC: 141 MMOL/L (ref 136–145)
WBC # BLD AUTO: 8.46 THOUSAND/UL (ref 4.31–10.16)

## 2021-12-11 PROCEDURE — 99232 SBSQ HOSP IP/OBS MODERATE 35: CPT | Performed by: INTERNAL MEDICINE

## 2021-12-11 PROCEDURE — 85025 COMPLETE CBC W/AUTO DIFF WBC: CPT | Performed by: INTERNAL MEDICINE

## 2021-12-11 PROCEDURE — 80048 BASIC METABOLIC PNL TOTAL CA: CPT | Performed by: INTERNAL MEDICINE

## 2021-12-11 RX ORDER — FLUTICASONE PROPIONATE 50 MCG
1 SPRAY, SUSPENSION (ML) NASAL DAILY
Status: DISCONTINUED | OUTPATIENT
Start: 2021-12-11 | End: 2021-12-13 | Stop reason: HOSPADM

## 2021-12-11 RX ORDER — LANOLIN ALCOHOL/MO/W.PET/CERES
3 CREAM (GRAM) TOPICAL
Status: DISCONTINUED | OUTPATIENT
Start: 2021-12-11 | End: 2021-12-13 | Stop reason: HOSPADM

## 2021-12-11 RX ADMIN — ALLOPURINOL 100 MG: 100 TABLET ORAL at 08:27

## 2021-12-11 RX ADMIN — ASPIRIN 81 MG CHEWABLE TABLET 81 MG: 81 TABLET CHEWABLE at 08:27

## 2021-12-11 RX ADMIN — FLUTICASONE PROPIONATE 1 SPRAY: 50 SPRAY, METERED NASAL at 06:31

## 2021-12-11 RX ADMIN — AMLODIPINE BESYLATE 10 MG: 5 TABLET ORAL at 08:27

## 2021-12-11 RX ADMIN — Medication 3 MG: at 23:07

## 2021-12-11 RX ADMIN — METOPROLOL TARTRATE 100 MG: 50 TABLET, FILM COATED ORAL at 11:22

## 2021-12-11 RX ADMIN — METOPROLOL TARTRATE 100 MG: 50 TABLET, FILM COATED ORAL at 20:56

## 2021-12-11 RX ADMIN — ENOXAPARIN SODIUM 40 MG: 100 INJECTION SUBCUTANEOUS at 14:43

## 2021-12-11 RX ADMIN — PRAVASTATIN SODIUM 10 MG: 10 TABLET ORAL at 17:27

## 2021-12-11 RX ADMIN — CLOPIDOGREL BISULFATE 75 MG: 75 TABLET ORAL at 08:27

## 2021-12-11 RX ADMIN — LISINOPRIL 40 MG: 20 TABLET ORAL at 08:27

## 2021-12-11 RX ADMIN — DULOXETINE 30 MG: 30 CAPSULE, DELAYED RELEASE ORAL at 08:27

## 2021-12-12 LAB
ANION GAP SERPL CALCULATED.3IONS-SCNC: 13 MMOL/L (ref 4–13)
ATRIAL RATE: 49 BPM
BASOPHILS # BLD AUTO: 0.04 THOUSANDS/ΜL (ref 0–0.1)
BASOPHILS NFR BLD AUTO: 1 % (ref 0–1)
BUN SERPL-MCNC: 26 MG/DL (ref 5–25)
CALCIUM SERPL-MCNC: 8.7 MG/DL (ref 8.3–10.1)
CHLORIDE SERPL-SCNC: 104 MMOL/L (ref 100–108)
CO2 SERPL-SCNC: 24 MMOL/L (ref 21–32)
CREAT SERPL-MCNC: 0.89 MG/DL (ref 0.6–1.3)
EOSINOPHIL # BLD AUTO: 0.19 THOUSAND/ΜL (ref 0–0.61)
EOSINOPHIL NFR BLD AUTO: 3 % (ref 0–6)
ERYTHROCYTE [DISTWIDTH] IN BLOOD BY AUTOMATED COUNT: 13.2 % (ref 11.6–15.1)
GFR SERPL CREATININE-BSD FRML MDRD: 96 ML/MIN/1.73SQ M
GLUCOSE SERPL-MCNC: 94 MG/DL (ref 65–140)
HCT VFR BLD AUTO: 44.1 % (ref 36.5–49.3)
HGB BLD-MCNC: 14.4 G/DL (ref 12–17)
IMM GRANULOCYTES # BLD AUTO: 0.05 THOUSAND/UL (ref 0–0.2)
IMM GRANULOCYTES NFR BLD AUTO: 1 % (ref 0–2)
LYMPHOCYTES # BLD AUTO: 2.24 THOUSANDS/ΜL (ref 0.6–4.47)
LYMPHOCYTES NFR BLD AUTO: 31 % (ref 14–44)
MCH RBC QN AUTO: 27.4 PG (ref 26.8–34.3)
MCHC RBC AUTO-ENTMCNC: 32.7 G/DL (ref 31.4–37.4)
MCV RBC AUTO: 84 FL (ref 82–98)
MONOCYTES # BLD AUTO: 0.57 THOUSAND/ΜL (ref 0.17–1.22)
MONOCYTES NFR BLD AUTO: 8 % (ref 4–12)
NEUTROPHILS # BLD AUTO: 4.07 THOUSANDS/ΜL (ref 1.85–7.62)
NEUTS SEG NFR BLD AUTO: 56 % (ref 43–75)
NRBC BLD AUTO-RTO: 0 /100 WBCS
P AXIS: 22 DEGREES
PLATELET # BLD AUTO: 286 THOUSANDS/UL (ref 149–390)
PMV BLD AUTO: 9.6 FL (ref 8.9–12.7)
POTASSIUM SERPL-SCNC: 4 MMOL/L (ref 3.5–5.3)
PR INTERVAL: 156 MS
QRS AXIS: -11 DEGREES
QRSD INTERVAL: 102 MS
QT INTERVAL: 464 MS
QTC INTERVAL: 419 MS
RBC # BLD AUTO: 5.25 MILLION/UL (ref 3.88–5.62)
SODIUM SERPL-SCNC: 141 MMOL/L (ref 136–145)
T WAVE AXIS: 51 DEGREES
VENTRICULAR RATE: 49 BPM
WBC # BLD AUTO: 7.16 THOUSAND/UL (ref 4.31–10.16)

## 2021-12-12 PROCEDURE — 93005 ELECTROCARDIOGRAM TRACING: CPT

## 2021-12-12 PROCEDURE — 99232 SBSQ HOSP IP/OBS MODERATE 35: CPT | Performed by: INTERNAL MEDICINE

## 2021-12-12 PROCEDURE — 93010 ELECTROCARDIOGRAM REPORT: CPT | Performed by: INTERNAL MEDICINE

## 2021-12-12 PROCEDURE — 85025 COMPLETE CBC W/AUTO DIFF WBC: CPT | Performed by: INTERNAL MEDICINE

## 2021-12-12 PROCEDURE — 80048 BASIC METABOLIC PNL TOTAL CA: CPT | Performed by: INTERNAL MEDICINE

## 2021-12-12 RX ORDER — ECHINACEA PURPUREA EXTRACT 125 MG
1 TABLET ORAL EVERY 4 HOURS PRN
Status: DISCONTINUED | OUTPATIENT
Start: 2021-12-12 | End: 2021-12-13 | Stop reason: HOSPADM

## 2021-12-12 RX ORDER — HYDRALAZINE HYDROCHLORIDE 25 MG/1
25 TABLET, FILM COATED ORAL EVERY 12 HOURS
Status: DISCONTINUED | OUTPATIENT
Start: 2021-12-12 | End: 2021-12-13 | Stop reason: HOSPADM

## 2021-12-12 RX ADMIN — ASPIRIN 81 MG CHEWABLE TABLET 81 MG: 81 TABLET CHEWABLE at 08:17

## 2021-12-12 RX ADMIN — HYDRALAZINE HYDROCHLORIDE 25 MG: 25 TABLET, FILM COATED ORAL at 20:11

## 2021-12-12 RX ADMIN — METOPROLOL TARTRATE 75 MG: 50 TABLET, FILM COATED ORAL at 08:17

## 2021-12-12 RX ADMIN — Medication 3 MG: at 20:12

## 2021-12-12 RX ADMIN — CLOPIDOGREL BISULFATE 75 MG: 75 TABLET ORAL at 08:17

## 2021-12-12 RX ADMIN — FLUTICASONE PROPIONATE 1 SPRAY: 50 SPRAY, METERED NASAL at 08:18

## 2021-12-12 RX ADMIN — PRAVASTATIN SODIUM 10 MG: 10 TABLET ORAL at 16:55

## 2021-12-12 RX ADMIN — SALINE NASAL SPRAY 1 SPRAY: 1.5 SOLUTION NASAL at 13:29

## 2021-12-12 RX ADMIN — LISINOPRIL 40 MG: 20 TABLET ORAL at 08:17

## 2021-12-12 RX ADMIN — AMLODIPINE BESYLATE 10 MG: 5 TABLET ORAL at 08:17

## 2021-12-12 RX ADMIN — HYDRALAZINE HYDROCHLORIDE 25 MG: 25 TABLET, FILM COATED ORAL at 08:17

## 2021-12-12 RX ADMIN — DULOXETINE 30 MG: 30 CAPSULE, DELAYED RELEASE ORAL at 08:17

## 2021-12-12 RX ADMIN — ALLOPURINOL 100 MG: 100 TABLET ORAL at 08:17

## 2021-12-12 RX ADMIN — SALINE NASAL SPRAY 1 SPRAY: 1.5 SOLUTION NASAL at 10:51

## 2021-12-12 RX ADMIN — ENOXAPARIN SODIUM 40 MG: 100 INJECTION SUBCUTANEOUS at 14:27

## 2021-12-12 RX ADMIN — ACETAMINOPHEN 650 MG: 325 TABLET, FILM COATED ORAL at 13:40

## 2021-12-12 RX ADMIN — ACETAMINOPHEN 650 MG: 325 TABLET, FILM COATED ORAL at 20:19

## 2021-12-13 ENCOUNTER — PATIENT OUTREACH (OUTPATIENT)
Dept: CASE MANAGEMENT | Facility: OTHER | Age: 56
End: 2021-12-13

## 2021-12-13 VITALS
HEART RATE: 72 BPM | BODY MASS INDEX: 30.12 KG/M2 | DIASTOLIC BLOOD PRESSURE: 97 MMHG | HEIGHT: 63 IN | RESPIRATION RATE: 19 BRPM | TEMPERATURE: 97 F | OXYGEN SATURATION: 96 % | SYSTOLIC BLOOD PRESSURE: 176 MMHG | WEIGHT: 170 LBS

## 2021-12-13 LAB
ANION GAP SERPL CALCULATED.3IONS-SCNC: 13 MMOL/L (ref 4–13)
BASOPHILS # BLD AUTO: 0.05 THOUSANDS/ΜL (ref 0–0.1)
BASOPHILS NFR BLD AUTO: 1 % (ref 0–1)
BUN SERPL-MCNC: 26 MG/DL (ref 5–25)
CALCIUM SERPL-MCNC: 8.2 MG/DL (ref 8.3–10.1)
CHLORIDE SERPL-SCNC: 102 MMOL/L (ref 100–108)
CO2 SERPL-SCNC: 23 MMOL/L (ref 21–32)
CREAT SERPL-MCNC: 0.89 MG/DL (ref 0.6–1.3)
EOSINOPHIL # BLD AUTO: 0.17 THOUSAND/ΜL (ref 0–0.61)
EOSINOPHIL NFR BLD AUTO: 3 % (ref 0–6)
ERYTHROCYTE [DISTWIDTH] IN BLOOD BY AUTOMATED COUNT: 13.3 % (ref 11.6–15.1)
FLUAV RNA RESP QL NAA+PROBE: NEGATIVE
FLUBV RNA RESP QL NAA+PROBE: NEGATIVE
GFR SERPL CREATININE-BSD FRML MDRD: 96 ML/MIN/1.73SQ M
GLUCOSE SERPL-MCNC: 92 MG/DL (ref 65–140)
HCT VFR BLD AUTO: 45.2 % (ref 36.5–49.3)
HGB BLD-MCNC: 14.7 G/DL (ref 12–17)
IMM GRANULOCYTES # BLD AUTO: 0.04 THOUSAND/UL (ref 0–0.2)
IMM GRANULOCYTES NFR BLD AUTO: 1 % (ref 0–2)
LYMPHOCYTES # BLD AUTO: 2.18 THOUSANDS/ΜL (ref 0.6–4.47)
LYMPHOCYTES NFR BLD AUTO: 37 % (ref 14–44)
MCH RBC QN AUTO: 27.3 PG (ref 26.8–34.3)
MCHC RBC AUTO-ENTMCNC: 32.5 G/DL (ref 31.4–37.4)
MCV RBC AUTO: 84 FL (ref 82–98)
MONOCYTES # BLD AUTO: 0.5 THOUSAND/ΜL (ref 0.17–1.22)
MONOCYTES NFR BLD AUTO: 8 % (ref 4–12)
NEUTROPHILS # BLD AUTO: 3.04 THOUSANDS/ΜL (ref 1.85–7.62)
NEUTS SEG NFR BLD AUTO: 50 % (ref 43–75)
NRBC BLD AUTO-RTO: 0 /100 WBCS
PLATELET # BLD AUTO: 280 THOUSANDS/UL (ref 149–390)
PMV BLD AUTO: 9.6 FL (ref 8.9–12.7)
POTASSIUM SERPL-SCNC: 4 MMOL/L (ref 3.5–5.3)
RBC # BLD AUTO: 5.39 MILLION/UL (ref 3.88–5.62)
RSV RNA RESP QL NAA+PROBE: NEGATIVE
SARS-COV-2 RNA RESP QL NAA+PROBE: NEGATIVE
SODIUM SERPL-SCNC: 138 MMOL/L (ref 136–145)
WBC # BLD AUTO: 5.98 THOUSAND/UL (ref 4.31–10.16)

## 2021-12-13 PROCEDURE — 0241U HB NFCT DS VIR RESP RNA 4 TRGT: CPT | Performed by: PHYSICIAN ASSISTANT

## 2021-12-13 PROCEDURE — 99239 HOSP IP/OBS DSCHRG MGMT >30: CPT | Performed by: PHYSICIAN ASSISTANT

## 2021-12-13 PROCEDURE — 80048 BASIC METABOLIC PNL TOTAL CA: CPT | Performed by: INTERNAL MEDICINE

## 2021-12-13 PROCEDURE — 97530 THERAPEUTIC ACTIVITIES: CPT

## 2021-12-13 PROCEDURE — 97535 SELF CARE MNGMENT TRAINING: CPT

## 2021-12-13 PROCEDURE — 85025 COMPLETE CBC W/AUTO DIFF WBC: CPT | Performed by: INTERNAL MEDICINE

## 2021-12-13 RX ORDER — LANOLIN ALCOHOL/MO/W.PET/CERES
3 CREAM (GRAM) TOPICAL
Refills: 0
Start: 2021-12-13 | End: 2022-05-02

## 2021-12-13 RX ORDER — HYDRALAZINE HYDROCHLORIDE 25 MG/1
25 TABLET, FILM COATED ORAL EVERY 12 HOURS
Refills: 0
Start: 2021-12-13 | End: 2022-02-02 | Stop reason: SDUPTHER

## 2021-12-13 RX ORDER — PRAVASTATIN SODIUM 10 MG
10 TABLET ORAL
Refills: 0
Start: 2021-12-13 | End: 2022-02-02 | Stop reason: SDUPTHER

## 2021-12-13 RX ORDER — METOPROLOL TARTRATE 75 MG/1
75 TABLET, FILM COATED ORAL EVERY 12 HOURS SCHEDULED
Refills: 0
Start: 2021-12-13 | End: 2022-01-05

## 2021-12-13 RX ORDER — FLUTICASONE PROPIONATE 50 MCG
1 SPRAY, SUSPENSION (ML) NASAL DAILY
Refills: 0 | Status: ON HOLD
Start: 2021-12-14 | End: 2022-01-29 | Stop reason: ALTCHOICE

## 2021-12-13 RX ORDER — ECHINACEA PURPUREA EXTRACT 125 MG
1 TABLET ORAL AS NEEDED
Refills: 0
Start: 2021-12-13 | End: 2022-01-05

## 2021-12-13 RX ORDER — ALLOPURINOL 100 MG/1
100 TABLET ORAL DAILY
Refills: 0
Start: 2021-12-13 | End: 2022-02-02 | Stop reason: SDUPTHER

## 2021-12-13 RX ORDER — ASPIRIN 81 MG/1
81 TABLET, CHEWABLE ORAL DAILY
Refills: 0
Start: 2021-12-14

## 2021-12-13 RX ORDER — CLOPIDOGREL BISULFATE 75 MG/1
75 TABLET ORAL DAILY
Refills: 0 | Status: ON HOLD
Start: 2021-12-14 | End: 2022-01-29 | Stop reason: ALTCHOICE

## 2021-12-13 RX ADMIN — METOPROLOL TARTRATE 75 MG: 50 TABLET, FILM COATED ORAL at 08:24

## 2021-12-13 RX ADMIN — ALLOPURINOL 100 MG: 100 TABLET ORAL at 08:24

## 2021-12-13 RX ADMIN — CLOPIDOGREL BISULFATE 75 MG: 75 TABLET ORAL at 08:24

## 2021-12-13 RX ADMIN — DULOXETINE 30 MG: 30 CAPSULE, DELAYED RELEASE ORAL at 08:24

## 2021-12-13 RX ADMIN — FLUTICASONE PROPIONATE 1 SPRAY: 50 SPRAY, METERED NASAL at 08:25

## 2021-12-13 RX ADMIN — LISINOPRIL 40 MG: 20 TABLET ORAL at 08:24

## 2021-12-13 RX ADMIN — ASPIRIN 81 MG CHEWABLE TABLET 81 MG: 81 TABLET CHEWABLE at 08:24

## 2021-12-13 RX ADMIN — AMLODIPINE BESYLATE 10 MG: 5 TABLET ORAL at 08:24

## 2021-12-13 RX ADMIN — HYDRALAZINE HYDROCHLORIDE 25 MG: 25 TABLET, FILM COATED ORAL at 08:24

## 2021-12-20 ENCOUNTER — PATIENT OUTREACH (OUTPATIENT)
Dept: CASE MANAGEMENT | Facility: OTHER | Age: 56
End: 2021-12-20

## 2021-12-27 ENCOUNTER — PATIENT OUTREACH (OUTPATIENT)
Dept: CASE MANAGEMENT | Facility: OTHER | Age: 56
End: 2021-12-27

## 2021-12-28 ENCOUNTER — TRANSITIONAL CARE MANAGEMENT (OUTPATIENT)
Dept: FAMILY MEDICINE CLINIC | Facility: HOSPITAL | Age: 56
End: 2021-12-28

## 2021-12-28 ENCOUNTER — PATIENT OUTREACH (OUTPATIENT)
Dept: CASE MANAGEMENT | Facility: OTHER | Age: 56
End: 2021-12-28

## 2021-12-28 ENCOUNTER — TELEPHONE (OUTPATIENT)
Dept: FAMILY MEDICINE CLINIC | Facility: HOSPITAL | Age: 56
End: 2021-12-28

## 2021-12-28 ENCOUNTER — PATIENT OUTREACH (OUTPATIENT)
Dept: FAMILY MEDICINE CLINIC | Facility: HOSPITAL | Age: 56
End: 2021-12-28

## 2021-12-29 ENCOUNTER — PATIENT OUTREACH (OUTPATIENT)
Dept: FAMILY MEDICINE CLINIC | Facility: HOSPITAL | Age: 56
End: 2021-12-29

## 2022-01-03 ENCOUNTER — TELEPHONE (OUTPATIENT)
Dept: NEUROLOGY | Facility: CLINIC | Age: 57
End: 2022-01-03

## 2022-01-03 NOTE — TELEPHONE ENCOUNTER
1st Attempt Spoke with Pt's Father to jose HFU appt, he stated Pt is not home to sche this appt and he does not know when he will be back, he also stated Pt does not have a cell phone   HFU/SLUB/Acute ischemic stroke/DC12/13    Note from Chart:     The patient will need follow up in 4-6 weeks with a neurovascular attending, no testing pending

## 2022-01-04 ENCOUNTER — PATIENT OUTREACH (OUTPATIENT)
Dept: FAMILY MEDICINE CLINIC | Facility: HOSPITAL | Age: 57
End: 2022-01-04

## 2022-01-04 NOTE — PROGRESS NOTES
Outpatient Care Management Note:    Care manager called Nancy Fraga and spoke with his girl friend, aj  She states that Nancy Fraga does not like to talk on the phone  He was currently resting  Cm reviewed that he still needs to schedule a follow up appt with cardiology  Ehsan Ventura will call to schedule  Nancy Fraga is still being seen by Minnie BALL  CM reviewed my role  Ehsan Ventura agreed to taking my contact information  She and Nancy Fraga will call if they have any questions  Ehsan Ventura knows to call [facility] with any urgent concerns  Ehsan Ventura declined ongoing outreach at this time

## 2022-01-05 ENCOUNTER — OFFICE VISIT (OUTPATIENT)
Dept: FAMILY MEDICINE CLINIC | Facility: HOSPITAL | Age: 57
End: 2022-01-05
Payer: MEDICARE

## 2022-01-05 VITALS
DIASTOLIC BLOOD PRESSURE: 70 MMHG | HEART RATE: 70 BPM | TEMPERATURE: 96.6 F | SYSTOLIC BLOOD PRESSURE: 122 MMHG | WEIGHT: 173.4 LBS | BODY MASS INDEX: 30.72 KG/M2 | HEIGHT: 63 IN

## 2022-01-05 DIAGNOSIS — F33.42 RECURRENT MAJOR DEPRESSIVE DISORDER, IN FULL REMISSION (HCC): ICD-10-CM

## 2022-01-05 DIAGNOSIS — I10 ESSENTIAL HYPERTENSION: ICD-10-CM

## 2022-01-05 DIAGNOSIS — G81.91 HEMIPLEGIA AFFECTING RIGHT DOMINANT SIDE, UNSPECIFIED ETIOLOGY, UNSPECIFIED HEMIPLEGIA TYPE (HCC): ICD-10-CM

## 2022-01-05 DIAGNOSIS — I63.9 ACUTE ISCHEMIC STROKE (HCC): Primary | ICD-10-CM

## 2022-01-05 PROCEDURE — 99495 TRANSJ CARE MGMT MOD F2F 14D: CPT | Performed by: FAMILY MEDICINE

## 2022-01-05 RX ORDER — METOPROLOL TARTRATE 75 MG/1
50 TABLET, FILM COATED ORAL EVERY 12 HOURS SCHEDULED
Start: 2022-01-05 | End: 2022-02-02 | Stop reason: SDUPTHER

## 2022-01-05 NOTE — PROGRESS NOTES
Patient is here for  TRANSITIONAL CARE MANAGEMENT      Problem List Items Addressed This Visit        Cardiovascular and Mediastinum    Acute ischemic stroke [facility]) - Primary    Essential hypertension    Relevant Medications    Metoprolol Tartrate 75 MG TABS       Nervous and Auditory    Hemiplegia affecting right dominant side, unspecified etiology, unspecified hemiplegia type ([facility] [address] )       Other    Recurrent major depressive disorder, in full remission ([facility] [address] )        Pt now at home  S/p acute rehab    cotninuing with PT at home  Thus far no arythmia, no afib  Has heart monitor   Fu with cardiolgoy  On plavix and ASA  On pravastatin ( due to lipitor and myalgia)  Thus far he is tolerating this  Fu with Neurology  MDD  On cymbalta  Stable  Continue with the same  HTn  Controlled on current regimen  Discussed lifestyle changes  Fu in 4 weeks  Handicap parking  Forms completed due to need for NFCE  No follow-ups on file  To call our office if any concerns/questions at [phone]     -------------------------------------------------------------------------------------------------------------      TCM Call (since 12/5/2021)     Date and time call was made  1/3/2022  1:26 PM    Patient was hospitialized at  [facility]        Date of Admission  12/08/21    Date of discharge  12/24/21    Diagnosis  acute ischemic stroke     Disposition  Home    Were the patients medications reviewed and updated  No    Current Symptoms  None      TCM Call (since 12/5/2021)     Post hospital issues  None    Should patient be enrolled in anticoag monitoring? No    Scheduled for follow up?   Yes    Did you obtain your prescribed medications  Yes    Do you need help managing your prescriptions or medications  No    Is transportation to your appointment needed  No    I have advised the patient to call PCP with any new or worsening symptoms  Camryn Piedra MA                Pt seen for tcm      Had acute cva  Affecting speech and with left sided hemiplegia  Reviewed hospital dc  Is seeing neurology  Reviewed medications  Was dc'd to acute rehab at [facility]  Now with services at home  No new complaints  Review of Systems   Constitutional: Negative  Negative for activity change, appetite change, chills and diaphoresis  HENT: Negative for congestion and dental problem  Respiratory: Negative  Negative for apnea, chest tightness, shortness of breath and wheezing  Cardiovascular: Negative  Negative for chest pain, palpitations and leg swelling  Gastrointestinal: Negative  Negative for abdominal distention, abdominal pain, constipation, diarrhea and nausea  Genitourinary: Negative  Negative for difficulty urinating, dysuria and frequency         Social History     Socioeconomic History    Marital status:      Spouse name: Not on file    Number of children: Not on file    Years of education: Not on file    Highest education level: Not on file   Occupational History    Not on file   Tobacco Use    Smoking status: Never Smoker    Smokeless tobacco: Never Used   Substance and Sexual Activity    Alcohol use: Not Currently     Comment: social    Drug use: No    Sexual activity: Not Currently   Other Topics Concern    Not on file   Social History Narrative    Not on file     Social Determinants of Health     Financial Resource Strain: Not on file   Food Insecurity: Not on file   Transportation Needs: Not on file   Physical Activity: Not on file   Stress: Not on file   Social Connections: Not on file   Intimate Partner Violence: Not on file   Housing Stability: Not on file               Allergies   Allergen Reactions    Lipitor [Atorvastatin] Other (See Comments) and Myalgia     myalgia    Hctz [Hydrochlorothiazide] Rash and Hives           Vitals:    01/05/22 1020   BP: 122/70   Pulse: 70   Temp: (!) 96 6 °F (35 9 °C)     BP Readings from Last 3 Encounters:   01/05/22 122/70   12/13/21 (!) 176/97   11/07/19 142/100      Wt Readings from Last 3 Encounters:   01/05/22 78 7 kg (173 lb 6 4 oz)   12/09/21 77 1 kg (170 lb)   12/08/21 77 1 kg (170 lb)        Physical Exam  Vitals and nursing note reviewed  Constitutional:       Appearance: Normal appearance  HENT:      Head: Normocephalic  Nose: Nose normal       Mouth/Throat:      Mouth: Mucous membranes are moist       Comments: Tongue laceration on left  Healing  Eyes:      Extraocular Movements: Extraocular movements intact  Pupils: Pupils are equal, round, and reactive to light  Cardiovascular:      Rate and Rhythm: Normal rate  Pulmonary:      Effort: Pulmonary effort is normal       Breath sounds: Normal breath sounds  Abdominal:      General: Bowel sounds are normal       Palpations: Abdomen is soft  Musculoskeletal:         General: Normal range of motion  Cervical back: Normal range of motion  Skin:     General: Skin is warm  Capillary Refill: Capillary refill takes less than 2 seconds  Neurological:      Mental Status: He is alert and oriented to person, place, and time  GCS: GCS eye subscore is 4  GCS verbal subscore is 5  GCS motor subscore is 6  Comments: right sided hemiplegia  In wheelchair and usually using a walker for ambulation  Psychiatric:         Mood and Affect: Mood normal          Behavior: Behavior normal                      No follow-ups on file

## 2022-01-05 NOTE — H&P (VIEW-ONLY)
Patient is here for  TRANSITIONAL CARE MANAGEMENT      Problem List Items Addressed This Visit        Cardiovascular and Mediastinum    Acute ischemic stroke [facility]) - Primary    Essential hypertension    Relevant Medications    Metoprolol Tartrate 75 MG TABS       Nervous and Auditory    Hemiplegia affecting right dominant side, unspecified etiology, unspecified hemiplegia type ([facility] [address] )       Other    Recurrent major depressive disorder, in full remission ([facility] [address] )        Pt now at home  S/p acute rehab    cotninuing with PT at home  Thus far no arythmia, no afib  Has heart monitor   Fu with cardiolgoy  On plavix and ASA  On pravastatin ( due to lipitor and myalgia)  Thus far he is tolerating this  Fu with Neurology  MDD  On cymbalta  Stable  Continue with the same  HTn  Controlled on current regimen  Discussed lifestyle changes  Fu in 4 weeks  Handicap parking  Forms completed due to need for NFCE  No follow-ups on file  To call our office if any concerns/questions at [phone]     -------------------------------------------------------------------------------------------------------------      TCM Call (since 12/5/2021)     Date and time call was made  1/3/2022  1:26 PM    Patient was hospitialized at  [address]  [facility]        Date of Admission  12/08/21    Date of discharge  12/24/21    Diagnosis  acute ischemic stroke     Disposition  Home    Were the patients medications reviewed and updated  No    Current Symptoms  None      TCM Call (since 12/5/2021)     Post hospital issues  None    Should patient be enrolled in anticoag monitoring? No    Scheduled for follow up?   Yes    Did you obtain your prescribed medications  Yes    Do you need help managing your prescriptions or medications  No    Is transportation to your appointment needed  No    I have advised the patient to call PCP with any new or worsening symptoms  Skylar Grant MA                Pt seen for tcm      Had acute cva  Affecting speech and with left sided hemiplegia  Reviewed hospital dc  Is seeing neurology  Reviewed medications  Was dc'd to acute rehab at [facility]  Now with services at home  No new complaints  Review of Systems   Constitutional: Negative  Negative for activity change, appetite change, chills and diaphoresis  HENT: Negative for congestion and dental problem  Respiratory: Negative  Negative for apnea, chest tightness, shortness of breath and wheezing  Cardiovascular: Negative  Negative for chest pain, palpitations and leg swelling  Gastrointestinal: Negative  Negative for abdominal distention, abdominal pain, constipation, diarrhea and nausea  Genitourinary: Negative  Negative for difficulty urinating, dysuria and frequency         Social History     Socioeconomic History    Marital status:      Spouse name: Not on file    Number of children: Not on file    Years of education: Not on file    Highest education level: Not on file   Occupational History    Not on file   Tobacco Use    Smoking status: Never Smoker    Smokeless tobacco: Never Used   Substance and Sexual Activity    Alcohol use: Not Currently     Comment: social    Drug use: No    Sexual activity: Not Currently   Other Topics Concern    Not on file   Social History Narrative    Not on file     Social Determinants of Health     Financial Resource Strain: Not on file   Food Insecurity: Not on file   Transportation Needs: Not on file   Physical Activity: Not on file   Stress: Not on file   Social Connections: Not on file   Intimate Partner Violence: Not on file   Housing Stability: Not on file               Allergies   Allergen Reactions    Lipitor [Atorvastatin] Other (See Comments) and Myalgia     myalgia    Hctz [Hydrochlorothiazide] Rash and Hives           Vitals:    01/05/22 1020   BP: 122/70   Pulse: 70   Temp: (!) 96 6 °F (35 9 °C)     BP Readings from Last 3 Encounters:   01/05/22 122/70   12/13/21 (!) 176/97   11/07/19 142/100      Wt Readings from Last 3 Encounters:   01/05/22 78 7 kg (173 lb 6 4 oz)   12/09/21 77 1 kg (170 lb)   12/08/21 77 1 kg (170 lb)        Physical Exam  Vitals and nursing note reviewed  Constitutional:       Appearance: Normal appearance  HENT:      Head: Normocephalic  Nose: Nose normal       Mouth/Throat:      Mouth: Mucous membranes are moist       Comments: Tongue laceration on left  Healing  Eyes:      Extraocular Movements: Extraocular movements intact  Pupils: Pupils are equal, round, and reactive to light  Cardiovascular:      Rate and Rhythm: Normal rate  Pulmonary:      Effort: Pulmonary effort is normal       Breath sounds: Normal breath sounds  Abdominal:      General: Bowel sounds are normal       Palpations: Abdomen is soft  Musculoskeletal:         General: Normal range of motion  Cervical back: Normal range of motion  Skin:     General: Skin is warm  Capillary Refill: Capillary refill takes less than 2 seconds  Neurological:      Mental Status: He is alert and oriented to person, place, and time  GCS: GCS eye subscore is 4  GCS verbal subscore is 5  GCS motor subscore is 6  Comments: right sided hemiplegia  In wheelchair and usually using a walker for ambulation  Psychiatric:         Mood and Affect: Mood normal          Behavior: Behavior normal                      No follow-ups on file

## 2022-01-17 ENCOUNTER — EVALUATION (OUTPATIENT)
Dept: PHYSICAL THERAPY | Facility: CLINIC | Age: 57
End: 2022-01-17
Payer: MEDICARE

## 2022-01-17 ENCOUNTER — EVALUATION (OUTPATIENT)
Dept: SPEECH THERAPY | Facility: CLINIC | Age: 57
End: 2022-01-17
Payer: MEDICARE

## 2022-01-17 DIAGNOSIS — I63.9 CEREBROVASCULAR ACCIDENT (CVA), UNSPECIFIED MECHANISM (HCC): ICD-10-CM

## 2022-01-17 DIAGNOSIS — R53.1 RIGHT SIDED WEAKNESS: Primary | ICD-10-CM

## 2022-01-17 DIAGNOSIS — R47.1 DYSARTHRIA: Primary | ICD-10-CM

## 2022-01-17 DIAGNOSIS — R26.89 BALANCE DISORDER: ICD-10-CM

## 2022-01-17 DIAGNOSIS — R53.83 DECREASED STAMINA: ICD-10-CM

## 2022-01-17 PROCEDURE — 97163 PT EVAL HIGH COMPLEX 45 MIN: CPT

## 2022-01-17 PROCEDURE — 92522 EVALUATE SPEECH PRODUCTION: CPT

## 2022-01-17 NOTE — PROGRESS NOTES
PT Evaluation     Today's date: 2022  Patient name: Trinity Wasserman  : 1965  MRN: [id number]  Referring provider: Jonn Varela PA-C  Dx:   Encounter Diagnosis     ICD-10-CM    1  Right sided weakness  R53 1    2  Balance disorder  R26 89    3  Decreased stamina  R53 83    4  Cerebrovascular accident (CVA), unspecified mechanism ([facility] [address] )  I63 9        Start Time: 1300  Stop Time: 1400  Total time in clinic (min): 60 minutes    Assessment  Assessment details: Patient is a 65y o  year old male presenting to OPPT s/p diagnosis of CVA  Patient demonstrates decreased strength, endurance, balance, and functional independence  Pt classifies as a limited community ambulator with a gait speed of 0 53 m/s with a RW  He demonstrates R hip circumduction for toe clearance and R trendelenburg gait  His TUG and Lin balance score indicate he is at risk for falls  Waldemar Amen requires assistance for ADLs and IADLs  He will benefit from skilled PT interventions to maximize return to PLOF and independence as able  Thank you for this referral and the ability to participate in the care of this patient  Impairments: abnormal gait, activity intolerance, impaired balance, impaired physical strength, lacks appropriate home exercise program and safety issue  Understanding of Dx/Px/POC: good   Prognosis: good    Goals  In 4 weeks, patient will:  1  Pt will achieve MDC value, 2 9s, on TUG using least restrictive device to demonstrate reduced fall risk  2  Improve 5xSTS by at least 2 seconds to demonstrate improved lower extremity strength and support  3  Demonstrate increased strength of R lower extremity by at least 1/2 grade to allow for improved transfer quality and stability  4  Demonstrate ability to perform 20 minutes of activity without requiring seated rest break  In 4-10 weeks, patient will:  1  Meet gait speed of 0 70 m/s with least restrictive device to meet MDC value and reduce risk of falls    2   Improve Emery Dadds balance score by at least 5 points to meet MDC value and show improved balance with ADLs  3   Report independence with walking program and consistent carryover with HEP  4   Improve 2MWT by at least 40ft with least restrictive device to show improved endurance to complete ADLs  5  Improve FOTO score to predicted value to demonstrate improved functional mobility  Plan  Patient would benefit from: skilled physical therapy  Other planned modality interventions: as needed  Planned therapy interventions: neuromuscular re-education, manual therapy, patient education, home exercise program, therapeutic exercise, therapeutic activities and gait training  Frequency: 2x week  Duration in weeks: 8  Plan of Care beginning date: 1/17/2022  Plan of Care expiration date: 3/18/2022  Treatment plan discussed with: patient and family        Subjective Evaluation    History of Present Illness  Mechanism of injury: Present at PT: Pt reports to oupt with his significant other eulalia  Reports that he was at inpatient rehab for 2 weeks  Had all disciplines there as well as with home health  Was dc'd last Friday  Using a RW to mobilize around the house  Notes his depression has been getting in the way of being more active  Feels like he just wants to lay around a lot  Did get afo while in rehab (on dc day)  Has not been wearing it as it is uncomfortable  Using shower chair  Reacher to put shoes on  Has difficulty balancing to get dressed  Needs to sit frequently while doing ADLs    Pain  No pain reported    Social Support  Steps to enter house: yes  3  Stairs in house: yes   12  Lives with: spouse    Employment status: not working (disability)  Treatments  Previous treatment: physical therapy, speech therapy and occupational therapy  Current treatment: speech therapy  Discharged from (in last 30 days): home health care  Patient Goals  Patient goals for therapy: increased strength, improved balance and independence with ADLs/IADLs  Patient goal: "Maybe walk without the walker"        Objective     Strength/Myotome Testing     Left Hip   Planes of Motion   Flexion: 4+  Extension: 4+  Abduction: 4+    Right Hip   Planes of Motion   Flexion: 4-  Extension: 4-  Abduction: 4-    Left Knee   Extension: 4+    Right Knee   Extension: 4-    Left Ankle/Foot   Dorsiflexion: 4+  Plantar flexion: 4+    Right Ankle/Foot   Dorsiflexion: 3  Plantar flexion: 3  Neuro Exam:     Functional outcomes   Gait speed: 0 53 m/s  5x sit to stand: 09 54 seconds mult falls backward (seconds)  2 minute walk test: 177 ft w RW  TUG: 15 02 RW (seconds)  Functional outcome comment: See Chandni Malik in flowsheet  Functional outcome gait comment: Pt ambulates with RW  R hip circumduction and R trendelenburg  Does have AFO however, pt notes he does not walk with it as it hurts his knee/foot while wearing               Precautions: Depression  Re-eval Date: 2/17/2022    Date 1/17       Visit Count 1       FOTO See IE       Pain In See IE       Pain Out                Testing        TUG 15 02  RW       5xSTS 09 54       Gait speed 0 53 m/s RW       Lin 33/56       2/6MWT 2mwt 177 ft RW       Neuro Re-Ed        Dynamic balance        Static balance        Obstacle course                                        Ther Ex        SLR x 4        HR/TR        Mini Squats        Step ups                                        Ther Activity        ADL                Gait Training        Divided Attention        Head turns        Modalities         prn

## 2022-01-17 NOTE — PROGRESS NOTES
Speech-Language Pathology Initial Evaluation    Today's date: 2022  Patients name: Mikhail Nicolas  : 1965  MRN: [id number]  Safety measures:   Referring provider: No ref  provider found      Visit tracking:  -Referring provider: Epic  -Billing guidelines: CMS  -Visit # 1  -Insurance: Medicare  -RE due:  or 10 visits    Subjective comments: "I had a stroke "    How did the patient hear about us? Through physician    Patient's goal(s): To sound more clear  Reason for referral: Decreased speech intelligibility  Prior functional status: Communication effective and appropriate in all situations  Clinically complex situations: Discharge from SNF or Hospital in the last 30 days    History: Patient is a 65 y o  male who was referred to outpatient skilled Speech Therapy services for a motor speech evaluation  60-year-old male with hypertension and hyperlipidemia, not compliant with his medications, presented to the ED 22 for evaluation of stroke-like symptoms including right-sided weakness, right facial droop, and dysarthria  MRI: 22: IMPRESSION:     Moderate-sized acute infarct within the left corona radiata extending inferiorly into the posterior limb of the internal capsule without mass effect or hemorrhagic transformation      There is also a punctate acute infarct within the right paramedian des on series 2 image 11      Moderate diffuse chronic microangiopathic change within the cerebral hemispheres and brainstem      This examination was marked "immediate notification" in Epic in order to begin the standard process by which the radiology reading room liaison alerts the referring practitioner  Hearing: WFL  Vision: WFL    Home environment/lifestyle: Girlfirend, no children  Lives with girlfriend  Lives with Arlette Ernandez for father who had a prior stroke  Highest level of education: Grew up in [employer]    Vocational status: On disability- started 6 years ago - was having gout and HTN  Was a  prior to this  Mental status: Alert  Behavior status: Cooperative  Communication modalities: Verbal  Rehabilitation prognosis: Good rehab potential to reach the established goals    Assessments    Motor Speech Evaluation:    -Facial appearance Right facial droop   -Mandible function Adequate ROM   -Dentition Adequate   -Labial function Decreased ROM (right side) and Decreased strength (right side)   -Lingual function Decreased coordination   -Velar function Unable to visualize   -Oral Apraxia? Absent   -Vocal Quality Clear/adequate   -Volitional Cough Strong/productive   -Respiration Shallow   -Drooling? No   -Tremor/Involuntary Movement? Not present   -Tracheostomy Present? No       1  Sustained phonation    -Vowel prolongation (norm=15-20 sec) 15 sec        2  Diadochokinetic (DDK) Rates    -puh-puh-puh (norm=3 0-5 5 rep/1 sec) WFL   -tuh-tuh-tuh (norm=25 rep/10 sec) Fast   -kuh-kuh-kuh (norm=25 rep/10 sec) Irregular, fast, discoordinated   -puh-tuh-kuh (norm=8 rep/10 sec) Fast, not as articulate    Impulsivity, distractibility noted   3  Articulation    -Single syllable words 63% intelligible   -Multisyllabic words 20% intelligible   -Repetition of multisyllabic words 5x 0% intelligible   -Words of progressive length 60% intelligible   -Sentences 40% intelligible   -Reading ([place]) 50% intelligible   -Conversation 25-30% intelligible       4  Counting    -1 to 20 Very fast with no intake of extra breaths   -20 to 1 DNT     Patient presents with severe dysarthria characterized by Imprecise articulation, Decreased breath support for speech and Fast rate  Goals      Short Term Goals:    1  Patient will perform oral motor exercises 30x each (with and without resistance) to facilitate improved strength and function for increased speech intelligibility      2  Patient will utilize over-articulation & slow rate 80% of the time while orally reading words/phrases/sentences/paragraph length material to facilitate increased speech intelligibility with fading minimal cues  3  Patient will complete structured oral reading and conversational tasks with the consistent use of compensatory strategies >80% of the time to facilitate increased speech intelligibility  4  Patient will perform oral motor and diadochokinetic speech rate exercises with > 90% accuracy to facilitate increased speech intelligibility  5  Further cognitive formal assessment as warranted  Long Term Goals:    1  Patient will independently utilize speech intelligibility strategies (e g , over-exaggeration, slow rate, breath groups, etc ) during oral reading tasks >90% intelligibility to facilitate increased generalization of skills into conversational speech by discharge  2  Patient will improve the ability to facilitate functional speech production skills for intelligible communication including compensatory strategies to promote quality of life and to maximize level of independence with communication  Impressions/Recommendations    Impressions:   -Patient presents with severe dysarthria characterized by very fast rate, misarticulation, decreased breath support following CVA x 1 in December 2021  Patient has marked low intelligibility (approximately 30% in conversation)  Patient frustrated and motivated to improve his communication so he can be understood and able to "express himself "  Patient did appear distracted & impulsive at times and additional cognitive assessment may be warranted; however, priority at this time is improving clarity of expressive communication  Recommend motor speech therapy focused on oral motor exercises and training of compensatory strategies and functional utilization from word- conversational level to maximize communication in expression of basic-semi complex needs, wants & ideas      Recommendations:  -Patient would benefit from outpatient skilled Speech Therapy services: Speech-language therapy    -Frequency: 2x weekly  -Duration: 6-8 weeks    -Intervention certification from: 1/63/4527  -Intervention certification to: 1/79/91    -Intervention comments:   OME's, training of strategies, compensation techniques, finger tapping, overarticulation/slow rate, breath intake, voice recording to increase insight/self monitoring, words-conversation  Functional phrases

## 2022-01-20 ENCOUNTER — TELEPHONE (OUTPATIENT)
Dept: CARDIOLOGY CLINIC | Facility: CLINIC | Age: 57
End: 2022-01-20

## 2022-01-20 NOTE — TELEPHONE ENCOUNTER
----- Message from Sam Sofia, [address] sent at 12/9/2021  1:39 PM EST -----  Hi,   Can we please start process of getting this patient set up for a loop recorder placement  He is hospitalized at [address] [place] [address] and likely discharge tomorrow  I told him office would be in touch to arrange as OP    Thank you  Bailey Tompkins

## 2022-01-21 ENCOUNTER — OFFICE VISIT (OUTPATIENT)
Dept: PHYSICAL THERAPY | Facility: CLINIC | Age: 57
End: 2022-01-21
Payer: MEDICARE

## 2022-01-21 ENCOUNTER — PREP FOR PROCEDURE (OUTPATIENT)
Dept: CARDIOLOGY CLINIC | Facility: CLINIC | Age: 57
End: 2022-01-21

## 2022-01-21 ENCOUNTER — OFFICE VISIT (OUTPATIENT)
Dept: SPEECH THERAPY | Facility: CLINIC | Age: 57
End: 2022-01-21
Payer: MEDICARE

## 2022-01-21 DIAGNOSIS — R53.1 RIGHT SIDED WEAKNESS: Primary | ICD-10-CM

## 2022-01-21 DIAGNOSIS — I63.9 CEREBROVASCULAR ACCIDENT (CVA), UNSPECIFIED MECHANISM (HCC): Primary | ICD-10-CM

## 2022-01-21 DIAGNOSIS — I63.9 CEREBROVASCULAR ACCIDENT (CVA), UNSPECIFIED MECHANISM (HCC): ICD-10-CM

## 2022-01-21 DIAGNOSIS — R26.89 BALANCE DISORDER: ICD-10-CM

## 2022-01-21 DIAGNOSIS — R47.1 DYSARTHRIA: Primary | ICD-10-CM

## 2022-01-21 DIAGNOSIS — R53.83 DECREASED STAMINA: ICD-10-CM

## 2022-01-21 PROCEDURE — 97112 NEUROMUSCULAR REEDUCATION: CPT

## 2022-01-21 PROCEDURE — 97116 GAIT TRAINING THERAPY: CPT

## 2022-01-21 PROCEDURE — 92507 TX SP LANG VOICE COMM INDIV: CPT

## 2022-01-21 NOTE — PROGRESS NOTES
Daily Note     Today's date: 2022  Patient name: Bautista Kirkpatrick  : 1965  MRN: [id number]  Referring provider: Luis Enrique Bright PA-C  Dx:   Encounter Diagnosis     ICD-10-CM    1  Right sided weakness  R53 1    2  Balance disorder  R26 89    3  Decreased stamina  R53 83    4  Cerebrovascular accident (CVA), unspecified mechanism ([facility] [address] )  I63 9        Start Time: 0930  Stop Time: 1015BP: 115/74  Prior to exercise:  94% SaO2  , HR:  55          127/81 after exercise,  95% SaO2,  HR  55  Total time in clinic (min): 45 minutes    Subjective:   Overall fatigue noted, no complaints of pain  Objective: See treatment diary below      Assessment:  Initiated increased ambulation with RW initially, decreased clearance of right LE in gait noted  Per patient and wife, Jennifer Rosales is going to come in and modify/replace MAFO due to rubbing and pain in right ankle with use  In clinic used MAFO off the shelf to allow for improved clearance  Initiated gait in Solo step without device, cuing to increase right stride length with nice gains  Initially placed heel lift in left shoe to promote increased right clearance, removed with nice carryover  Intermittent rests given attending to increased [place] and lateral list with fatigue  Plan:   Continue PT per POC  No complaints post treatment  Progress to compliant challenges in Solo step to tolerance  Consider stairs       Precautions: Depression  Re-eval Date: 2022    Date       Visit Count 1 2      FOTO See IE       Pain In See IE 0/10      Pain Out                Testing        TUG 15 02  RW       5xSTS 09 54       Gait speed 0 53 m/s RW       Lin 33/56       2/6MWT 2mwt 177 ft RW       Neuro Re-Ed        Dynamic balance        Static balance        Obstacle course        Gait in solo step with focus on increased stride  2 laps x6                              Ther Ex        SLR x 4        HR/TR        Mini Squats        Step ups Ther Activity        ADL                Gait Training        Divided Attention  Gait with '      Head turns        Modalities         prn

## 2022-01-21 NOTE — PROGRESS NOTES
Daily Speech Treatment Note    Today's date: 2022  Patients name: Mati Nixon  : 1965  MRN: [id number]  Safety measures:   Referring provider: No ref  provider found    Encounter Diagnosis     ICD-10-CM    1  Dysarthria  R47 1    2  Cerebrovascular accident (CVA), unspecified mechanism ([facility] [address] )  I63 9        Visit Trackin     Subjective/Behavioral:  -Flat affect overall, yawning, notes he is always tired and sleeping a lot  Objective/Assessment:  Completed structured activities with patient to target goals below  Results as stated below  Short-term goals:  Short Term Goals:     1  Patient will perform oral motor exercises 30x each (with and without resistance) to facilitate improved strength and function for increased speech intelligibility  : Trained in and completed OMEs with mirror  Extra cueing for labial retraction  Provided handouts, reviewed with girlfriend, Joshua Mcclain        2  Patient will utilize over-articulation & slow rate 80% of the time while orally reading words/phrases/sentences/paragraph length material to facilitate increased speech intelligibility with fading minimal cues  : Patient trained  In compensatory strategies - able to determine correctly if SLP talking at fast or slow with words/phrases  Introduced finger tapping, taking abdom  Breath  Able to model correct slow, speech at word level: 90%, phrases at 70% following modeling        3  Patient will complete structured oral reading and conversational tasks with the consistent use of compensatory strategies >80% of the time to facilitate increased speech intelligibility      4  Patient will perform oral motor and diadochokinetic speech rate exercises with > 90% accuracy to facilitate increased speech intelligibility     5  Further cognitive formal assessment as warranted          Long Term Goals:     1   Patient will independently utilize speech intelligibility strategies (e g , over-exaggeration, slow rate, breath groups, etc ) during oral reading tasks >90% intelligibility to facilitate increased generalization of skills into conversational speech by discharge       2  Patient will improve the ability to facilitate functional speech production skills for intelligible communication including compensatory strategies to promote quality of life and to maximize level of independence with communication  Plan:  -Continue with current plan of care  OME's, training of strategies, compensation techniques, finger tapping, overarticulation/slow rate, breath intake, voice recording to increase insight/self monitoring, words-conversation  Functional phrases

## 2022-01-21 NOTE — TELEPHONE ENCOUNTER
Patient scheduled for loop implant at [facility] on 1/29/22  Patient familly member aware of general instructions  Patient cover under medicare

## 2022-01-24 ENCOUNTER — OFFICE VISIT (OUTPATIENT)
Dept: PHYSICAL THERAPY | Facility: CLINIC | Age: 57
End: 2022-01-24
Payer: MEDICARE

## 2022-01-24 ENCOUNTER — OFFICE VISIT (OUTPATIENT)
Dept: SPEECH THERAPY | Facility: CLINIC | Age: 57
End: 2022-01-24
Payer: MEDICARE

## 2022-01-24 DIAGNOSIS — I63.9 CEREBROVASCULAR ACCIDENT (CVA), UNSPECIFIED MECHANISM (HCC): ICD-10-CM

## 2022-01-24 DIAGNOSIS — R47.1 DYSARTHRIA: Primary | ICD-10-CM

## 2022-01-24 DIAGNOSIS — R53.83 DECREASED STAMINA: ICD-10-CM

## 2022-01-24 DIAGNOSIS — R26.89 BALANCE DISORDER: ICD-10-CM

## 2022-01-24 DIAGNOSIS — R53.1 RIGHT SIDED WEAKNESS: Primary | ICD-10-CM

## 2022-01-24 PROCEDURE — 92507 TX SP LANG VOICE COMM INDIV: CPT

## 2022-01-24 PROCEDURE — 97110 THERAPEUTIC EXERCISES: CPT

## 2022-01-24 PROCEDURE — 97112 NEUROMUSCULAR REEDUCATION: CPT

## 2022-01-24 NOTE — PROGRESS NOTES
Daily Speech Treatment Note    Today's date: 2022  Patients name: Ramana Vásquez  : 1965  MRN: [id number]  Safety measures:   Referring provider: No ref  provider found    Encounter Diagnosis     ICD-10-CM    1  Dysarthria  R47 1    2  Cerebrovascular accident (CVA), unspecified mechanism ([facility] [address] )  I63 9        Visit Tracking:  3     Subjective/Behavioral:  -Pleasant/Cooperative    Objective/Assessment:  Completed structured activities with patient to target goals below  Results as stated below  Short Term Goals:     1  Patient will perform oral motor exercises 30x each (with and without resistance) to facilitate improved strength and function for increased speech intelligibility  : Trained in and completed OMEs with mirror  Extra cueing for labial retraction  Provided handouts, reviewed with girlfriend, Norberto Cárdenas        2  Patient will utilize over-articulation & slow rate 80% of the time while orally reading words/phrases/sentences/paragraph length material to facilitate increased speech intelligibility with fading minimal cues  : Patient trained  In compensatory strategies - able to determine correctly if SLP talking at fast or slow with words/phrases  Introduced finger tapping, taking abdom  Breath  Able to model correct slow, speech at word level: 90%, phrases at 70% following modeling   : Words/phrases - utilizing reading and strategies 80% of the time, decreased to 65% with multisyllabic words and 60% with sentences, increased accuracy with use of finger (verbal paraphasic errors & missing words / ending of words)        3  Patient will complete structured oral reading and conversational tasks with the consistent use of compensatory strategies >80% of the time to facilitate increased speech intelligibility        4  Patient will perform oral motor and diadochokinetic speech rate exercises with > 90% accuracy to facilitate increased speech intelligibility     5   Further cognitive formal assessment as warranted           Long Term Goals:     1  Patient will independently utilize speech intelligibility strategies (e g , over-exaggeration, slow rate, breath groups, etc ) during oral reading tasks >90% intelligibility to facilitate increased generalization of skills into conversational speech by discharge       2  Patient will improve the ability to facilitate functional speech production skills for intelligible communication including compensatory strategies to promote quality of life and to maximize level of independence with communication  Plan:  -Continue with current plan of care  Working with multisyllabic words and sentences   voice recording to increase insight/self monitoring, words-conversation  Functional phrases

## 2022-01-24 NOTE — PROGRESS NOTES
Daily Note     Today's date: 2022  Patient name: Magdaleno Crandall  : 1965  MRN: [id number]  Referring provider: Amalia Watkins PA-C  Dx:   Encounter Diagnosis     ICD-10-CM    1  Right sided weakness  R53 1    2  Balance disorder  R26 89    3  Decreased stamina  R53 83    4  Cerebrovascular accident (CVA), unspecified mechanism ([facility] [address] )  I63 9            BP: 113/77  Prior to exercise:  93% SaO2  , HR: 61          118/79 after exercise,  95% SaO2,  HR62         Subjective:   Pt state she is tired today  Pt states his hips are bothering him a bit tired today  Pt state his legs feel like jello today  Objective: See treatment diary below  Assessment: Pt progressed through exercises well today with no increase in pain and moderate fatigue  We focused on functional movements with his right LE  Pt was able to progress with his knee drive along with increasing his step length with ambulation  As pt fatigues he tends to loose his center of gravity and is unable to regain his balance without assistance  Pt was able to ambulate with less LOB post cone taps  Pt would continue to benefit from PT  Plan:   Continue PT per POC  Consider stairs next session as tolerated        Precautions: Depression  Re-eval Date: 2022    Date      Visit Count 1 2      FOTO See IE       Pain In See IE 0/10      Pain Out                Testing        TUG 15 02  RW       5xSTS 09 54  2 x 5      Gait speed 0 53 m/s RW       Lin 33/56       2/6MWT 2mwt 177 ft RW       Neuro Re-Ed        Dynamic balance        Static balance        Obstacle course        Gait in solo step with focus on increased stride  2 laps x6 Flat hurdles 1 lap   3 laps x 3      Color cone taps    10x ea leg                      Ther Ex        SLR x 4        HR/TR        Mini Squats        Step ups                                        Ther Activity        ADL                Gait Training        Divided Attention  Gait with ' Head turns        Modalities         prn

## 2022-01-26 ENCOUNTER — OFFICE VISIT (OUTPATIENT)
Dept: SPEECH THERAPY | Facility: CLINIC | Age: 57
End: 2022-01-26
Payer: MEDICARE

## 2022-01-26 ENCOUNTER — OFFICE VISIT (OUTPATIENT)
Dept: PHYSICAL THERAPY | Facility: CLINIC | Age: 57
End: 2022-01-26
Payer: MEDICARE

## 2022-01-26 DIAGNOSIS — I63.9 CEREBROVASCULAR ACCIDENT (CVA), UNSPECIFIED MECHANISM (HCC): ICD-10-CM

## 2022-01-26 DIAGNOSIS — R53.1 RIGHT SIDED WEAKNESS: Primary | ICD-10-CM

## 2022-01-26 DIAGNOSIS — R26.89 BALANCE DISORDER: ICD-10-CM

## 2022-01-26 DIAGNOSIS — R53.83 DECREASED STAMINA: ICD-10-CM

## 2022-01-26 DIAGNOSIS — R47.1 DYSARTHRIA: Primary | ICD-10-CM

## 2022-01-26 PROCEDURE — 97116 GAIT TRAINING THERAPY: CPT

## 2022-01-26 PROCEDURE — 92507 TX SP LANG VOICE COMM INDIV: CPT

## 2022-01-26 PROCEDURE — 97112 NEUROMUSCULAR REEDUCATION: CPT

## 2022-01-26 NOTE — PROGRESS NOTES
Daily Speech Treatment Note    Today's date: 2022  Patients name: Arnaldo Hurst  : 1965  MRN: [id number]  Safety measures:   Referring provider: No ref  provider found    Encounter Diagnosis     ICD-10-CM    1  Dysarthria  R47 1    2  Cerebrovascular accident (CVA), unspecified mechanism ([facility] [address] )  I63 9        Visit Trackin     Subjective/Behavioral:  -Pleasant/Cooperative    Objective/Assessment:  Completed structured activities with patient to target goals below  Results as stated below  Encouraged reading out loud from newspaper / article every day for practice with reading and using strategies for increased clarity  Short Term Goals:     1  Patient will perform oral motor exercises 30x each (with and without resistance) to facilitate improved strength and function for increased speech intelligibility  : Trained in and completed OMEs with mirror  Extra cueing for labial retraction  Provided handouts, reviewed with girlfriendBurke        2  Patient will utilize over-articulation & slow rate 80% of the time while orally reading words/phrases/sentences/paragraph length material to facilitate increased speech intelligibility with fading minimal cues  : Patient trained  In compensatory strategies - able to determine correctly if SLP talking at fast or slow with words/phrases  Introduced finger tapping, taking abdom  Breath  Able to model correct slow, speech at word level: 90%, phrases at 70% following modeling   : Words/phrases - utilizing reading and strategies 80% of the time, decreased to 65% with multisyllabic words and 60% with sentences, increased accuracy with use of finger (verbal paraphasic errors & missing words / ending of words)  22:  Reading sentences with slow speech independently: 80% accuracy  Bisyllabic words: 80% accuracy, Trisyllabic words: 80% accuracy    Patient with literal paraphasic errors reading via BDAE set of words: 50% accuracy - educated patient on changes with reading accuracy post CVA  Read single letters with 100% accuracy  Formulated list of 10 functional phrases he commonly uses for functional practice        3  Patient will complete structured oral reading and conversational tasks with the consistent use of compensatory strategies >80% of the time to facilitate increased speech intelligibility        4  Patient will perform oral motor and diadochokinetic speech rate exercises with > 90% accuracy to facilitate increased speech intelligibility     5  Further cognitive formal assessment as warranted           Long Term Goals:     1  Patient will independently utilize speech intelligibility strategies (e g , over-exaggeration, slow rate, breath groups, etc ) during oral reading tasks >90% intelligibility to facilitate increased generalization of skills into conversational speech by discharge       2  Patient will improve the ability to facilitate functional speech production skills for intelligible communication including compensatory strategies to promote quality of life and to maximize level of independence with communication  Plan:  -Continue with current plan of care  Working with multisyllabic words and sentences   voice recording to increase insight/self monitoring, words-conversation  Functional phrases  Start to work on asking structured closed end [id number] responses for increased carryover

## 2022-01-26 NOTE — PROGRESS NOTES
Daily Note     Today's date: 2022  Patient name: Soto Vyas  : 1965  MRN: [id number]  Referring provider: Samra Bautista PA-C  Dx:   Encounter Diagnosis     ICD-10-CM    1  Right sided weakness  R53 1    2  Balance disorder  R26 89    3  Decreased stamina  R53 83    4  Cerebrovascular accident (CVA), unspecified mechanism ([facility] [address] )  I63 9        Start Time: 1346  Stop Time: 1442  Total time in clinic (min): 56 minutes    Subjective:   Pt states that he is continuing to walk more at home with the use of his RW  Exercises going well  Physician modified his medication, no longer taking plavix  Having a loop recorder placed on Saturday  Objective: See treatment diary below  Post session vitals: /83, HR 69, SpO2 96%    Assessment: Vitals stable throughout session  Pt denies pain with use of trial PLS AFO  Responds well to verbal cues for inc R step length  Still demonstrates R trendelenburg gait  Good response to addition of high intensity gait training, working in RPE range of 5-7  With fatigue, noted decrease in R knee & hip flexion needed for toe clearance  Occasional need for stepping strategy or PT assist throughout session, solo step used for safety  Tendency to not flex hip enough to clear 4' step when returning to standing and would catch foot  Standing/seated rests provided secondary to general fatigue and hip fatigue  No complaints post session  Plan:   Continue PT per POC  Stair training in beginning of session  Continue high intensity gait training as able       Precautions: Depression, Falls  Re-eval Date: 2022    Date     Visit Count 1 2 3 4    FOTO See IE       Pain In See IE 0/10      Pain Out                Testing        TUG 15 02  RW       5xSTS 09 54  2 x 5      Gait speed 0 53 m/s RW       Lin 33/56       2/6MWT 2mwt 177 ft RW       Neuro Re-Ed        Dynamic balance    Step taps 4' 2x10    Static balance    Weight shift with LLE on 4' step - focus on RLE WB    Reaching stacking cones 3x8 weight shift toward RLE    Obstacle course        Gait in solo step with focus on increased stride  2 laps x6 Flat hurdles 1 lap   3 laps x 3      Color cone taps    10x ea leg      High intensity gait    RTB resistance from PT solo step no AD 4 laps x3 RPE 5-6    RTB resistance from PT with weighted vest no AD solo step 4laps x1 RPE 6-7            Ther Ex        SLR x 4        HR/TR        Mini Squats        Step ups                                        Ther Activity        ADL                Gait Training    Solo step no AD 20ft x6 laps seated rests after 3 laps - cues for inc step length    Gait with RW ortho side cues for inc step length and inc hip/knee flex    Divided Attention  Gait with '      Head turns        Modalities         prn

## 2022-01-28 NOTE — DISCHARGE INSTRUCTIONS
- Keep loop recorder incision dry for one week  Do not use lotions, powders, creams, or ointments on incision      - Remove outer bandage 24-48 hours after procedure  There is waterproof glue present over the incision  This should keep the incision dry  Avoid picking at the glue or peeling it off  The glue will come off in its own time      - Because the glue is waterproof, it is safe to shower if the glue remains on over the incision  It is ok to let the soap and water gently run over the incision  Avoid direct exposure to the stream of water  Do not soak the incision  Do not scrub  Pat dry  - If the glue comes off in less that 7 days, place a waterproof bandage over the incision before showering     - If present, leave underlying steri-strips in place  They will either fall off on their own or will be removed at the 2 week follow up appointment  If present, leave stitches in place  They will be removed at the 2 week follow up appointment  - Please call the office if you notice redness, swelling, bleeding, or drainage from incision or if you develop fevers  Cardiac Loop Recorder Insertion      WHAT YOU SHOULD KNOW:    A cardiac loop recorder is a device used to diagnose heart rhythm problems, such as a fast or irregular heartbeat  It is implanted in your left chest, just under the skin  The device records a pattern of your heart's rhythm, called an EKG  Your device records automatic EKGs, depending on how your caregiver programs it  You may also receive a handheld controller  You press a button on the controller when you have symptoms, such as dizziness, lightheadedness, or palpitations  The device will record an EKG at that moment  The recording can help your caregiver see if your symptoms may be caused by heart rhythm problems  Your caregiver will remove the device after it has collected enough data  You may need the device for up to 3 years   The procedure to remove the device is similar to the procedure used to implant it  AFTER YOU LEAVE:    Follow up with your cardiologist as directed: You will need to present to the office in 2 weeks  A nurse at the device clinic will check your incision and remove any stitches or steri strips that may be present  They may also program your device settings again  They will retrieve data from the device every 1 to 3 months with a monitor held over your skin  You may be able to transmit data from your device from home as well  You will do this by calling a number provided by the device clinic or as they have instructed you  Ask for information about this process  Write down your questions so you remember to ask them during your visits  Wound care: Keep loop recorder incision dry for one week  Do not use lotions, powders, creams, or ointments on incision  Remove outer bandage 24-48 hours after procedure  There is waterproof glue present over the incision  This should keep the incision dry  Avoid picking at the glue or peeling it off  The glue will come off in its own time  Because the glue is waterproof, it is safe to shower if the glue remains on over the incision  It is ok to let the soap and water gently run over the incision  Avoid direct exposure to the stream of water  Do not soak the incision  Do not scrub  Pat dry  If the glue comes off in less that 7 days, place a waterproof bandage over the incision before showering  If present, leave underlying steri-strips in place  They will either fall off on their own or will be removed at the 2 week follow up appointment  If present, leave stitches in place  They will be removed at the 2 week follow up appointment  Please call the office if you notice redness, swelling, bleeding, or drainage from incision or if you develop fevers  Return to activity: If you received anesthesia, you will not be able to drive for 24 hours  Otherwise, most people can return to normal activities soon after the procedure   Your cardiologist may want to know if your work involves electrical current or high-voltage equipment  Ask about other electrical items that could interfere with your cardiac loop recorder  Contact your cardiologist if:   · You have a fever or chills  · Your wound is red, swollen, or draining pus  · You have questions or concerns about your condition or care  Seek care immediately or call 911 if:   · You feel weak, dizzy, or faint  · You lose consciousness  © 2014 [employer] is for End User's use only and may not be sold, redistributed or otherwise used for commercial purposes  All illustrations and images included in CareNotes® are the copyrighted property of A [employer] M [employer]  or Prashanth Levin  The above information is an  only  It is not intended as medical advice for individual conditions or treatments  Talk to your doctor, nurse or pharmacist before following any medical regimen to see if it is safe and effective for you

## 2022-01-29 ENCOUNTER — HOSPITAL ENCOUNTER (OUTPATIENT)
Facility: HOSPITAL | Age: 57
Setting detail: OUTPATIENT SURGERY
Discharge: HOME | End: 2022-01-29
Attending: INTERNAL MEDICINE | Admitting: INTERNAL MEDICINE
Payer: MEDICARE

## 2022-01-29 VITALS
SYSTOLIC BLOOD PRESSURE: 121 MMHG | DIASTOLIC BLOOD PRESSURE: 77 MMHG | OXYGEN SATURATION: 97 % | TEMPERATURE: 97.8 F | HEART RATE: 56 BPM | RESPIRATION RATE: 18 BRPM

## 2022-01-29 DIAGNOSIS — I63.9 CEREBROVASCULAR ACCIDENT (CVA), UNSPECIFIED MECHANISM (HCC): ICD-10-CM

## 2022-01-29 PROCEDURE — 33285 INSJ SUBQ CAR RHYTHM MNTR: CPT | Performed by: PHYSICIAN ASSISTANT

## 2022-01-29 PROCEDURE — C1764 EVENT RECORDER, CARDIAC: HCPCS | Performed by: INTERNAL MEDICINE

## 2022-01-29 PROCEDURE — 33285 INSJ SUBQ CAR RHYTHM MNTR: CPT | Performed by: INTERNAL MEDICINE

## 2022-01-29 DEVICE — LOOP RECORDER REVEAL LINQ II SYS DEVICE ONLY: Type: IMPLANTABLE DEVICE | Site: CHEST  WALL | Status: FUNCTIONAL

## 2022-01-29 RX ORDER — LIDOCAINE HYDROCHLORIDE 10 MG/ML
INJECTION, SOLUTION EPIDURAL; INFILTRATION; INTRACAUDAL; PERINEURAL
Status: DISCONTINUED
Start: 2022-01-29 | End: 2022-01-29 | Stop reason: HOSPADM

## 2022-01-29 RX ORDER — LIDOCAINE HYDROCHLORIDE 10 MG/ML
INJECTION, SOLUTION EPIDURAL; INFILTRATION; INTRACAUDAL; PERINEURAL AS NEEDED
Status: DISCONTINUED | OUTPATIENT
Start: 2022-01-29 | End: 2022-01-29 | Stop reason: HOSPADM

## 2022-01-29 NOTE — INTERVAL H&P NOTE
H&P reviewed  After examining the patient I find no changes in the patients condition since the H&P had been written  Patient presents today to undergo loop recorder implant to monitor for atrial fibrillation after suffering cryptogenic stroke      Vitals:    01/29/22 0900   BP: 121/77   Pulse: 56   Resp: 18   Temp: 97 8 °F (36 6 °C)   SpO2: 97%

## 2022-01-31 ENCOUNTER — PATIENT OUTREACH (OUTPATIENT)
Dept: FAMILY MEDICINE CLINIC | Facility: HOSPITAL | Age: 57
End: 2022-01-31

## 2022-01-31 ENCOUNTER — OFFICE VISIT (OUTPATIENT)
Dept: PHYSICAL THERAPY | Facility: CLINIC | Age: 57
End: 2022-01-31
Payer: MEDICARE

## 2022-01-31 ENCOUNTER — OFFICE VISIT (OUTPATIENT)
Dept: SPEECH THERAPY | Facility: CLINIC | Age: 57
End: 2022-01-31
Payer: MEDICARE

## 2022-01-31 DIAGNOSIS — R47.1 DYSARTHRIA: Primary | ICD-10-CM

## 2022-01-31 DIAGNOSIS — R53.83 DECREASED STAMINA: ICD-10-CM

## 2022-01-31 DIAGNOSIS — I63.9 CEREBROVASCULAR ACCIDENT (CVA), UNSPECIFIED MECHANISM (HCC): ICD-10-CM

## 2022-01-31 DIAGNOSIS — R53.1 RIGHT SIDED WEAKNESS: Primary | ICD-10-CM

## 2022-01-31 DIAGNOSIS — R26.89 BALANCE DISORDER: ICD-10-CM

## 2022-01-31 PROCEDURE — 92507 TX SP LANG VOICE COMM INDIV: CPT

## 2022-01-31 PROCEDURE — 97116 GAIT TRAINING THERAPY: CPT

## 2022-01-31 PROCEDURE — 97112 NEUROMUSCULAR REEDUCATION: CPT

## 2022-01-31 NOTE — PROGRESS NOTES
Outpatient Care Management Note:    RN care manager attempted to call Mauri Mg to follow up after his recent loop recorder procedure  His mobile phone states the prescriber is not in service  Voice mail message left for Mauri Mg on home number, with my contact information, requesting a call back if he needs anything following his procedure

## 2022-01-31 NOTE — PROGRESS NOTES
Daily Speech Treatment Note    Today's date: 2022  Patients name: Kyle Engle  : 1965  MRN: [id number]  Safety measures:   Referring provider: No ref  provider found    Encounter Diagnosis     ICD-10-CM    1  Dysarthria  R47 1    2  Cerebrovascular accident (CVA), unspecified mechanism ([facility] [address] )  I63 9          Visit Trackin    Subjective/Behavioral:  -Pleasant/Cooperative    Objective/Assessment:  Completed structured activities with patient to target goals below  Results as stated below  Patient concerned regarding lip and left throat area  Noted incision left lateral lingual area- seems to be hitting tooth  Suggested  Consult with dentist and consider ENT consult for assessment  Patient focused on lack of taste with foods, sensation of salty with most foods per patient  Does have taste with pudding, pickled cabbage, miguel a slaw  ? Nerve involvement  Encouraged reading out loud from newspaper / article every day for practice with reading and using strategies for increased clarity (Focusing on LOUD approach at this time)  Short Term Goals:     1  Patient will perform oral motor exercises 30x each (with and without resistance) to facilitate improved strength and function for increased speech intelligibility  : Trained in and completed OMEs with mirror  Extra cueing for labial retraction  Provided handouts, reviewed with girlfriend, Lonzo Cheadle        2  Patient will utilize over-articulation & slow rate 80% of the time while orally reading words/phrases/sentences/paragraph length material to facilitate increased speech intelligibility with fading minimal cues  : Patient trained  In compensatory strategies - able to determine correctly if SLP talking at fast or slow with words/phrases  Introduced finger tapping, taking abdom  Breath  Able to model correct slow, speech at word level: 90%, phrases at 70% following modeling   :  Words/phrases - utilizing reading and strategies 80% of the time, decreased to 65% with multisyllabic words and 60% with sentences, increased accuracy with use of finger (verbal paraphasic errors & missing words / ending of words)  1/26/22:  Reading sentences with slow speech independently: 80% accuracy  Bisyllabic words: 80% accuracy, Trisyllabic words: 80% accuracy  Patient with literal paraphasic errors reading via BDAE set of words: 50% accuracy - educated patient on changes with reading accuracy post CVA  Read single letters with 100% accuracy  Formulated list of 10 functional phrases he commonly uses for functional practice  1/31/22: Introduced LOUD approach with loud, long /a/: 12 seconds average duration, 65 dB loudness, functional phrases 64 dB average, will work on pitches next session  Multisyllabic words and targeted /s/, /l/ /s blends/ 80% accuracy  Literal paraphasic errors noted less than 25% of the time with reading sentences in particular but improving      3  Patient will complete structured oral reading and conversational tasks with the consistent use of compensatory strategies >80% of the time to facilitate increased speech intelligibility        4  Patient will perform oral motor and diadochokinetic speech rate exercises with > 90% accuracy to facilitate increased speech intelligibility  1/31/22: /l/ sentences / tongue twisters: 70% accuracy        5  Further cognitive formal assessment as warranted           Long Term Goals:     1  Patient will independently utilize speech intelligibility strategies (e g , over-exaggeration, slow rate, breath groups, etc ) during oral reading tasks >90% intelligibility to facilitate increased generalization of skills into conversational speech by discharge       2  Patient will improve the ability to facilitate functional speech production skills for intelligible communication including compensatory strategies to promote quality of life and to maximize level of independence with communication  Plan:  -Continue with current plan of care  Working with multisyllabic words and sentences, LOUD approach   voice recording to increase insight/self monitoring, words-conversation  Functional phrases  Start to work on asking structured closed end [id number] responses for increased carryover, other s blend sentences

## 2022-01-31 NOTE — PROGRESS NOTES
Daily Note     Today's date: 2022  Patient name: Tahira Badillo  : 1965  MRN: [id number]  Referring provider: Aren Dove PA-C  Dx:   Encounter Diagnosis     ICD-10-CM    1  Right sided weakness  R53 1    2  Balance disorder  R26 89    3  Decreased stamina  R53 83    4  Cerebrovascular accident (CVA), unspecified mechanism ([facility] [address] )  I63 9        Start Time: 1308  Stop Time: 1350  Total time in clinic (min): 42 minutes    Subjective:   Pt reported that he had some L knee and hip pain after session last time, which reduced with rest  Has not received call from  about AFO  Loop recorder placed  Objective: See treatment diary below  Post session vitals: /88 , HR 74, SpO2 96%    Assessment: Vitals stable throughout session  Pt able to complete step-through pattern while on stairs, however, frequently would not clear toes and require another chance to clear on his RLE  Mod imbalance with stairs and single limb stance with 1 UE  Instructed to continue with step-to pattern at this time for safety  Does show some safety deficits most noted with sitting in chair, not turning enough prior to sitting  Still shows difficulty with co-contraction with RLE stance, occasional knee buckling  Cues for increased hip/knee flexion during gait training, noting inc difficulty with clearance despite AFO  Contact guard assist with any exercise outside of solo step  Plan:   Continue PT per POC  High intensity gait training as able  LE strengthening       Precautions: Depression, Falls  Re-eval Date: 2022    Date    Visit Count 1 2 3 4 5   FOTO See IE       Pain In See IE 0/10      Pain Out                Testing        TUG 15 02  RW       5xSTS 09 54  2 x 5      Gait speed 0 53 m/s RW       Lin 33/56       2/6MWT 2mwt 177 ft RW       Neuro Re-Ed        Dynamic balance    Step taps 4' 2x10    Static balance    Weight shift with LLE on 4' step - focus on RLE WB    Reaching stacking cones 3x8 weight shift toward RLE Solo step Weight shift with LLE on 4' step RLE WB reaching diagonal for cone stack 2x6   Obstacle course        Gait in solo step with focus on increased stride  2 laps x6 Flat hurdles 1 lap   3 laps x 3   Hurdles no AD Solo step 8 laps cues for hip/knee flex   Color cone taps    10x ea leg      High intensity gait    RTB resistance from PT solo step no AD 4 laps x3 RPE 5-6    RTB resistance from PT with weighted vest no AD solo step 4laps x1 RPE 6-7 RTB resistance from PT solo step 3 laps x2 RPE 5-6           Ther Ex        SLR x 4        HR/TR        Mini Squats        Step ups                                        Ther Activity        ADL                Gait Training    Solo step no AD 20ft x6 laps seated rests after 3 laps - cues for inc step length    Gait with RW ortho side cues for inc step length and inc hip/knee flex  CGA SPC ortho side x2 cues for inc step length, hip/knee drive, AFO     Stair training x3 step through and step to patterns 1 UE CGA   Divided Attention  Gait with '      Head turns        Modalities         prn

## 2022-02-02 ENCOUNTER — OFFICE VISIT (OUTPATIENT)
Dept: SPEECH THERAPY | Facility: CLINIC | Age: 57
End: 2022-02-02
Payer: MEDICARE

## 2022-02-02 ENCOUNTER — OFFICE VISIT (OUTPATIENT)
Dept: FAMILY MEDICINE CLINIC | Facility: HOSPITAL | Age: 57
End: 2022-02-02
Payer: MEDICARE

## 2022-02-02 ENCOUNTER — OFFICE VISIT (OUTPATIENT)
Dept: PHYSICAL THERAPY | Facility: CLINIC | Age: 57
End: 2022-02-02
Payer: MEDICARE

## 2022-02-02 VITALS — HEART RATE: 56 BPM | TEMPERATURE: 96.3 F | SYSTOLIC BLOOD PRESSURE: 138 MMHG | DIASTOLIC BLOOD PRESSURE: 80 MMHG

## 2022-02-02 DIAGNOSIS — R06.81 WITNESSED EPISODE OF APNEA: Primary | ICD-10-CM

## 2022-02-02 DIAGNOSIS — M47.896 OTHER OSTEOARTHRITIS OF SPINE, LUMBAR REGION: ICD-10-CM

## 2022-02-02 DIAGNOSIS — R26.89 BALANCE DISORDER: ICD-10-CM

## 2022-02-02 DIAGNOSIS — I63.9 CEREBROVASCULAR ACCIDENT (CVA), UNSPECIFIED MECHANISM (HCC): ICD-10-CM

## 2022-02-02 DIAGNOSIS — I63.9 ACUTE CVA (CEREBROVASCULAR ACCIDENT) (HCC): ICD-10-CM

## 2022-02-02 DIAGNOSIS — M1A.00X0 IDIOPATHIC CHRONIC GOUT WITHOUT TOPHUS, UNSPECIFIED SITE: ICD-10-CM

## 2022-02-02 DIAGNOSIS — I10 ESSENTIAL HYPERTENSION: ICD-10-CM

## 2022-02-02 DIAGNOSIS — R47.1 DYSARTHRIA: Primary | ICD-10-CM

## 2022-02-02 DIAGNOSIS — F33.42 RECURRENT MAJOR DEPRESSIVE DISORDER, IN FULL REMISSION (HCC): ICD-10-CM

## 2022-02-02 DIAGNOSIS — F51.19 OTHER HYPERSOMNIA NOT DUE TO A SUBSTANCE OR KNOWN PHYSIOLOGICAL CONDITION: ICD-10-CM

## 2022-02-02 DIAGNOSIS — R53.1 RIGHT SIDED WEAKNESS: Primary | ICD-10-CM

## 2022-02-02 DIAGNOSIS — R53.83 DECREASED STAMINA: ICD-10-CM

## 2022-02-02 PROCEDURE — 97116 GAIT TRAINING THERAPY: CPT

## 2022-02-02 PROCEDURE — 99214 OFFICE O/P EST MOD 30 MIN: CPT | Performed by: FAMILY MEDICINE

## 2022-02-02 PROCEDURE — 92507 TX SP LANG VOICE COMM INDIV: CPT

## 2022-02-02 PROCEDURE — 97112 NEUROMUSCULAR REEDUCATION: CPT

## 2022-02-02 RX ORDER — METOPROLOL TARTRATE 75 MG/1
75 TABLET, FILM COATED ORAL EVERY 12 HOURS SCHEDULED
Qty: 180 TABLET | Refills: 0 | Status: SHIPPED | OUTPATIENT
Start: 2022-02-02 | End: 2022-05-02

## 2022-02-02 RX ORDER — AMLODIPINE BESYLATE 10 MG/1
10 TABLET ORAL DAILY
Qty: 90 TABLET | Refills: 1 | Status: SHIPPED | OUTPATIENT
Start: 2022-02-02 | End: 2022-05-02 | Stop reason: SDUPTHER

## 2022-02-02 RX ORDER — ALLOPURINOL 100 MG/1
100 TABLET ORAL DAILY
Qty: 90 TABLET | Refills: 0 | Status: SHIPPED | OUTPATIENT
Start: 2022-02-02 | End: 2022-05-02 | Stop reason: SDUPTHER

## 2022-02-02 RX ORDER — PRAVASTATIN SODIUM 10 MG
10 TABLET ORAL
Qty: 90 TABLET | Refills: 1 | Status: SHIPPED | OUTPATIENT
Start: 2022-02-02 | End: 2022-05-02 | Stop reason: SDUPTHER

## 2022-02-02 RX ORDER — HYDRALAZINE HYDROCHLORIDE 25 MG/1
25 TABLET, FILM COATED ORAL EVERY 12 HOURS
Qty: 180 TABLET | Refills: 0 | Status: SHIPPED | OUTPATIENT
Start: 2022-02-02 | End: 2022-05-02 | Stop reason: SDUPTHER

## 2022-02-02 RX ORDER — DULOXETIN HYDROCHLORIDE 60 MG/1
60 CAPSULE, DELAYED RELEASE ORAL DAILY
Qty: 90 CAPSULE | Refills: 0 | Status: SHIPPED | OUTPATIENT
Start: 2022-02-02 | End: 2022-05-02 | Stop reason: SDUPTHER

## 2022-02-02 RX ORDER — LISINOPRIL 40 MG/1
40 TABLET ORAL DAILY
Qty: 90 TABLET | Refills: 0 | Status: SHIPPED | OUTPATIENT
Start: 2022-02-02 | End: 2022-05-02 | Stop reason: SDUPTHER

## 2022-02-02 NOTE — PROGRESS NOTES
Assessment/Plan:      Problem List Items Addressed This Visit        Cardiovascular and Mediastinum    Essential hypertension    Relevant Medications    hydrALAZINE (APRESOLINE) 25 mg tablet    amLODIPine (NORVASC) 10 mg tablet    lisinopril (ZESTRIL) 40 mg tablet    Metoprolol Tartrate 75 MG TABS       Musculoskeletal and Integument    Degenerative joint disease (DJD) of lumbar spine    Relevant Medications    DULoxetine (CYMBALTA) 60 mg delayed release capsule       Other    Idiopathic chronic gout    Relevant Medications    allopurinol (ZYLOPRIM) 100 mg tablet    Recurrent major depressive disorder, in full remission (HCC)    Relevant Medications    DULoxetine (CYMBALTA) 60 mg delayed release capsule      Other Visit Diagnoses     Witnessed episode of apnea    -  Primary    Relevant Orders    Home Study    Other hypersomnia not due to a substance or known physiological condition         Relevant Orders    Home Study    Acute CVA (cerebrovascular accident) ([facility] [address] )        Relevant Medications    pravastatin (PRAVACHOL) 10 mg tablet           Plan/Discussion:  In terms of recent cva he is stable and reports doing well with therapy  Ongoing depression  Appears to be worsening  Will increase cymbalta to 60 mg daily  No si/hi  Gout  Stable  Continue with the same  Witnessed apnea  Associated with hypersomnolence/fatige  Along with mood/depression and recent cva  A sleep study is recommended and agreed upon  Referral to sleep medicine following this  Htn  Stable  Continue to monitor  If ocntinues to be in the 130s will need to adjust medication further  Continue to restrict salt intake  Fu in 3 months  Subjective:   Chief Complaint   Patient presents with    Follow-up     4 week         Patient ID: Mikhail Nicolas is a 65 y o  male  Here for fu  Overall feeling okay  Going through PT/OT/speech  Reports worsening depression  Needing refill on cymbalta 30 mg daily       SO reports stopping breathing at night  w snoring  On review with sleepiness and fatigue during the day  The following portions of the patient's history were reviewed and updated as appropriate: allergies, current medications, past family history, past medical history, past social history, past surgical history and problem list     Review of Systems   Constitutional: Negative  Negative for activity change, appetite change, chills and diaphoresis  HENT: Negative for congestion and dental problem  Respiratory: Negative  Negative for apnea, chest tightness, shortness of breath and wheezing  Cardiovascular: Negative  Negative for chest pain, palpitations and leg swelling  Gastrointestinal: Negative  Negative for abdominal distention, abdominal pain, constipation, diarrhea and nausea  Genitourinary: Negative  Negative for difficulty urinating, dysuria and frequency  Objective:  Vitals:    02/02/22 1121   BP: 138/80   Pulse: 56   Temp: (!) 96 3 °F (35 7 °C)     BP Readings from Last 6 Encounters:   02/02/22 138/80   01/29/22 121/77   01/05/22 122/70   12/13/21 (!) 176/97   11/07/19 142/100   11/29/18 142/82      Wt Readings from Last 6 Encounters:   01/05/22 78 7 kg (173 lb 6 4 oz)   12/09/21 77 1 kg (170 lb)   12/08/21 77 1 kg (170 lb)   11/07/19 77 1 kg (170 lb)   11/29/18 75 7 kg (166 lb 12 8 oz)   04/26/18 67 1 kg (148 lb)             Physical Exam  Vitals and nursing note reviewed  Constitutional:       General: He is not in acute distress  Appearance: Normal appearance  He is well-developed  He is not ill-appearing  HENT:      Head: Normocephalic and atraumatic  Right Ear: External ear normal       Left Ear: External ear normal       Nose: Nose normal  No congestion or rhinorrhea  Mouth/Throat:      Mouth: Mucous membranes are moist       Pharynx: No oropharyngeal exudate or posterior oropharyngeal erythema     Eyes:      Extraocular Movements: Extraocular movements intact  Conjunctiva/sclera: Conjunctivae normal       Pupils: Pupils are equal, round, and reactive to light  Cardiovascular:      Rate and Rhythm: Normal rate and regular rhythm  Heart sounds: Normal heart sounds  No murmur heard  No friction rub  No gallop  Pulmonary:      Effort: Pulmonary effort is normal  No respiratory distress  Breath sounds: Normal breath sounds  No wheezing or rales  Chest:      Chest wall: No tenderness  Abdominal:      General: Bowel sounds are normal  There is no distension  Palpations: Abdomen is soft  There is no mass  Tenderness: There is no abdominal tenderness  There is no guarding or rebound  Musculoskeletal:         General: Normal range of motion  Cervical back: Normal range of motion and neck supple  Skin:     General: Skin is warm  Capillary Refill: Capillary refill takes less than 2 seconds  Neurological:      Mental Status: He is alert and oriented to person, place, and time     Psychiatric:         Mood and Affect: Mood normal          Behavior: Behavior normal

## 2022-02-02 NOTE — PROGRESS NOTES
Daily Speech Treatment Note    Today's date: 2022  Patients name: Bonifacio Vital  : 1965  MRN: [id number]  Safety measures:   Referring provider: No ref  provider found    Encounter Diagnosis     ICD-10-CM    1  Dysarthria  R47 1    2  Cerebrovascular accident (CVA), unspecified mechanism ([facility] [address] )  I63 9            Visit Trackin    Subjective/Behavioral:  -Patient feeling stressed beginning of the session, noting all the medical care/appointments he has, noting: "It's too much   "    Objective/Assessment:  Completed structured activities with patient to target goals below  Results as stated below  Continued LOUD approach to increase clarity- patient improves with intelligibility with this approach  Reviewed home work plan with patient and girlfriend to practice x 2 sets a day  Continued work with increasing self monitoring of loudness strategy  Next week: Sentences      Short Term Goals:     1  Patient will perform oral motor exercises 30x each (with and without resistance) to facilitate improved strength and function for increased speech intelligibility  : Trained in and completed OMEs with mirror  Extra cueing for labial retraction  Provided handouts, reviewed with girlfriendKaylyn        2  Patient will utilize over-articulation & slow rate 80% of the time while orally reading words/phrases/sentences/paragraph length material to facilitate increased speech intelligibility with fading minimal cues  22: Introduced LOUD approach with loud, long /a/: 12 seconds average duration, 65 dB loudness, functional phrases 64 dB average, will work on pitches next session  Multisyllabic words and targeted /s/, /l/ /s blends/ 80% accuracy  Literal paraphasic errors noted less than 25% of the time with reading sentences in particular but improving  22: Loud /a/: 62-69 dB loudness with duration span from 6 4-11 4  Pitches: 108-164 75 average range, Functional Phrases:  65 dB  Words: 66 dB  Practiced finishing automatic phrases, concrete fluency naming with using loudness: moderate cueing required         3  Patient will complete structured oral reading and conversational tasks with the consistent use of compensatory strategies >80% of the time to facilitate increased speech intelligibility        4  Patient will perform oral motor and diadochokinetic speech rate exercises with > 90% accuracy to facilitate increased speech intelligibility  1/31/22: /l/ sentences / tongue twisters: 70% accuracy        5  Further cognitive formal assessment as warranted           Long Term Goals:     1  Patient will independently utilize speech intelligibility strategies (e g , over-exaggeration, slow rate, breath groups, etc ) during oral reading tasks >90% intelligibility to facilitate increased generalization of skills into conversational speech by discharge       2  Patient will improve the ability to facilitate functional speech production skills for intelligible communication including compensatory strategies to promote quality of life and to maximize level of independence with communication  Plan:  -Continue with current plan of care  Working with multisyllabic words and sentences, LOUD approach   voice recording to increase insight/self monitoring, words-conversation  Functional phrases  Start to work on formulating functional responses given situation with LOUD strategy, other s blend sentences, LOUD carryover homework

## 2022-02-02 NOTE — PROGRESS NOTES
Daily Note     Today's date: 2022  Patient name: Farshad Nava  : 1965  MRN: [id number]  Referring provider: Tamika Grubbs PA-C  Dx:   Encounter Diagnosis     ICD-10-CM    1  Right sided weakness  R53 1    2  Balance disorder  R26 89    3  Decreased stamina  R53 83    4  Cerebrovascular accident (CVA), unspecified mechanism ([address] )  I63 9        Start Time: 3850  Stop Time: 1500  Total time in clinic (min): 40 minutes    Subjective:   Pt reported that he had L knee and hip pain post session after last visit  Nothing concerning but it lasted a few days  He notes that he is continuing to do more walking  Arrived with [facility]  Objective: See treatment diary below  Assessment: CGA used throughout, still noting deficits in spatial awareness  Pt still shows instability with step through pattern on hurdles, however, improved with practice  Appears to respond well to verbal cues for knee drive to assist with clearance through swing phase  Ambulates steadier with addition of AFO vs no AFO  Shows equal weight through LEs with sit to stand  With reduced concentration or LE fatigue noted less clearance on RLE with no AFO and SPC  Good coordination with SPC and improved stability with practice  Still shows difficulty with lateral stepping especially toward R side  Encouraged to continue use of SPC at this time and RW if needed  Plan:   Continue PT per POC  High intensity gait training as able  LE strengthening  Will follow up with Maryam about AFO       Precautions: Depression, Falls  Re-eval Date: 2022    Date        Visit Count 6       FOTO Performed       Pain In See IE       Pain Out                Testing        TUG 15 02  RW       5xSTS 09 54       Gait speed 0 53 m/s RW       Lin 33/56       2/6MWT 2mwt 177 ft RW       Neuro Re-Ed        Dynamic balance Hurdles solo step 10 laps no AD       Static balance        Obstacle course        Gait in solo step with focus on increased stride        Color cone taps         High intensity gait Weighted vest, uneven mat and hurdles on ground 8 laps cues for pacing no AD       // bars Lateral stepping UE assist - 6 laps    Bwd UE assist - 8 laps       Ther Ex        SLR x 4        HR/TR        Sit to stand Weighted vest x30       Step ups                                        Ther Activity        ADL                Gait Training SPC to ortho side verbal cues for step length and knee drive x2       Divided Attention        Head turns        Modalities         prn

## 2022-02-07 ENCOUNTER — OFFICE VISIT (OUTPATIENT)
Dept: PHYSICAL THERAPY | Facility: CLINIC | Age: 57
End: 2022-02-07
Payer: MEDICARE

## 2022-02-07 ENCOUNTER — OFFICE VISIT (OUTPATIENT)
Dept: SPEECH THERAPY | Facility: CLINIC | Age: 57
End: 2022-02-07
Payer: MEDICARE

## 2022-02-07 DIAGNOSIS — R26.89 BALANCE DISORDER: ICD-10-CM

## 2022-02-07 DIAGNOSIS — R53.1 RIGHT SIDED WEAKNESS: Primary | ICD-10-CM

## 2022-02-07 DIAGNOSIS — R53.83 DECREASED STAMINA: ICD-10-CM

## 2022-02-07 DIAGNOSIS — I63.9 CEREBROVASCULAR ACCIDENT (CVA), UNSPECIFIED MECHANISM (HCC): ICD-10-CM

## 2022-02-07 DIAGNOSIS — R47.1 DYSARTHRIA: Primary | ICD-10-CM

## 2022-02-07 PROCEDURE — 97112 NEUROMUSCULAR REEDUCATION: CPT

## 2022-02-07 PROCEDURE — 97116 GAIT TRAINING THERAPY: CPT

## 2022-02-07 PROCEDURE — 92507 TX SP LANG VOICE COMM INDIV: CPT

## 2022-02-07 NOTE — PROGRESS NOTES
Daily Note     Today's date: 2022  Patient name: Vickie Bond  : 1965  MRN: [id number]  Referring provider: Jareth Godinez PA-C  Dx:   Encounter Diagnosis     ICD-10-CM    1  Right sided weakness  R53 1    2  Balance disorder  R26 89    3  Decreased stamina  R53 83    4  Cerebrovascular accident (CVA), unspecified mechanism ([address] )  I63 9        Start Time: 1310  Stop Time: 1345  Total time in clinic (min): 35 minutes    Subjective:   Pt arrives slightly late for appt  Arrives with [employer]  Objective: See treatment diary below  Assessment: Some difficulties with spatial awareness regarding safety  Pt still demonstrates trendelenburg in R stance phase  PLS AFO used during session to assist with toe clearance  Still has some difficulties with maintaining proper step length on his RLE and with RLE stance phase - early toe off  When distracted his gait becomes more unstable and more varied in regard to step length and toe clearance  Treadmill used to facilitate high intensity gait training, will increase speed next session as patient is most likely able to tolerate  Plan:   Continue PT per POC  High intensity gait training as able  LE strengthening  Instructed to call  to follow up  Precautions: Depression, Falls  Re-eval Date: 2022    Date       Visit Count 6 7      FOTO Performed       Pain In See IE       Pain Out                Testing        TUG 15 02  RW       5xSTS 09 54       Gait speed 0 53 m/s RW       Lin 33/56       2/6MWT 2mwt 177 ft RW       Neuro Re-Ed        Dynamic balance Hurdles solo step 10 laps no AD NV      Static balance        Obstacle course        Gait in solo step with focus on increased stride        Color cone taps         High intensity gait Weighted vest, uneven mat and hurdles on ground 8 laps cues for pacing no AD RTB 10 laps x2 solo step no AD cues for pacing, inc step length RPE 6-7 AFO    TM 1  6mph 1min x3 RPE 5 AFO used      // bars Lateral stepping UE assist - 6 laps    Bwd UE assist - 8 laps       Ther Ex        SLR x 4        HR/TR        Sit to stand Weighted vest x30 Weighted vest x30      Step ups                                        Ther Activity        ADL                Gait Training SPC to ortho side verbal cues for step length and knee drive x2 SPC to ortho side verbal cues for step length and heel strike/knee drive x1      Divided Attention        Head turns        Modalities         prn

## 2022-02-07 NOTE — PROGRESS NOTES
Daily Speech Treatment Note    Today's date: 2022  Patients name: Soto Vyas  : 1965  MRN: [id number]  Safety measures:   Referring provider: No ref  provider found    Encounter Diagnosis     ICD-10-CM    1  Dysarthria  R47 1    2  Cerebrovascular accident (CVA), unspecified mechanism ([facility] [address] )  I63 9            Visit Trackin    Subjective/Behavioral:  -  Patient noting "Too much   "  Patient notes that his tongue is feeling better    Objective/Assessment:  Completed structured activities with patient to target goals below  Results as stated below  Short Term Goals:     1  Patient will perform oral motor exercises 30x each (with and without resistance) to facilitate improved strength and function for increased speech intelligibility  : Trained in and completed OMEs with mirror  Extra cueing for labial retraction  Provided handouts, reviewed with girlfriendLei        2  Patient will utilize over-articulation & slow rate 80% of the time while orally reading words/phrases/sentences/paragraph length material to facilitate increased speech intelligibility with fading minimal cues  22: Introduced LOUD approach with loud, long /a/: 12 seconds average duration, 65 dB loudness, functional phrases 64 dB average, will work on pitches next session  Multisyllabic words and targeted /s/, /l/ /s blends/ 80% accuracy  Literal paraphasic errors noted less than 25% of the time with reading sentences in particular but improving  22: Loud /a/: 62-69 dB loudness with duration span from 6 4-11 4  Pitches: 108-164 75 average range, Functional Phrases:  65 dB  Words: 66 dB  Practiced finishing automatic phrases, concrete fluency naming with using loudness: moderate cueing required  : 105- 185 Hz, Loud a/ 69 38 at 10 11 second duration average, 71 dB- functional phrases  68 sentences      3   Patient will complete structured oral reading and conversational tasks with the consistent use of compensatory strategies >80% of the time to facilitate increased speech intelligibility        4  Patient will perform oral motor and diadochokinetic speech rate exercises with > 90% accuracy to facilitate increased speech intelligibility  1/31/22: /l/ sentences / tongue twisters: 70% accuracy  2/7: Patient producing diadochokinetic speech rate tasks well with repetition, 70% accuracy with repetition       5  Further cognitive formal assessment as warranted           Long Term Goals:     1  Patient will independently utilize speech intelligibility strategies (e g , over-exaggeration, slow rate, breath groups, etc ) during oral reading tasks >90% intelligibility to facilitate increased generalization of skills into conversational speech by discharge       2  Patient will improve the ability to facilitate functional speech production skills for intelligible communication including compensatory strategies to promote quality of life and to maximize level of independence with communication  Plan:  -Continue with current plan of care  Working with multisyllabic words and sentences, LOUD approach   voice recording to increase insight/self monitoring, words-conversation  Functional phrases  Start to work on formulating functional responses given situation with LOUD strategy, other s blend sentences, LOUD carryover homework

## 2022-02-09 ENCOUNTER — OFFICE VISIT (OUTPATIENT)
Dept: PHYSICAL THERAPY | Facility: CLINIC | Age: 57
End: 2022-02-09
Payer: MEDICARE

## 2022-02-09 ENCOUNTER — OFFICE VISIT (OUTPATIENT)
Dept: SPEECH THERAPY | Facility: CLINIC | Age: 57
End: 2022-02-09
Payer: MEDICARE

## 2022-02-09 ENCOUNTER — IN-CLINIC DEVICE VISIT (OUTPATIENT)
Dept: CARDIOLOGY CLINIC | Facility: CLINIC | Age: 57
End: 2022-02-09
Payer: MEDICARE

## 2022-02-09 DIAGNOSIS — R47.1 DYSARTHRIA: Primary | ICD-10-CM

## 2022-02-09 DIAGNOSIS — R53.83 DECREASED STAMINA: ICD-10-CM

## 2022-02-09 DIAGNOSIS — Z95.818 PRESENCE OF OTHER CARDIAC IMPLANTS AND GRAFTS: Primary | ICD-10-CM

## 2022-02-09 DIAGNOSIS — R26.89 BALANCE DISORDER: ICD-10-CM

## 2022-02-09 DIAGNOSIS — I63.9 CEREBROVASCULAR ACCIDENT (CVA), UNSPECIFIED MECHANISM (HCC): ICD-10-CM

## 2022-02-09 DIAGNOSIS — R53.1 RIGHT SIDED WEAKNESS: Primary | ICD-10-CM

## 2022-02-09 PROCEDURE — 97110 THERAPEUTIC EXERCISES: CPT

## 2022-02-09 PROCEDURE — 93291 INTERROG DEV EVAL SCRMS IP: CPT | Performed by: INTERNAL MEDICINE

## 2022-02-09 PROCEDURE — 92507 TX SP LANG VOICE COMM INDIV: CPT

## 2022-02-09 PROCEDURE — 97112 NEUROMUSCULAR REEDUCATION: CPT

## 2022-02-09 NOTE — PROGRESS NOTES
Daily Note     Today's date: 2022  Patient name: Shikha Watson  : 1965  MRN: [id number]  Referring provider: Jennie Thompson PA-C  Dx:   Encounter Diagnosis     ICD-10-CM    1  Right sided weakness  R53 1    2  Balance disorder  R26 89    3  Decreased stamina  R53 83    4  Cerebrovascular accident (CVA), unspecified mechanism ([address] )  I63 9        Start Time: 2468  Stop Time: 1500  Total time in clinic (min): 48 minutes    Subjective:   Pt arrives with SPC  New AFO  Pt notes things are going well  Objective: See treatment diary below  Post session vitals: 125/74 LUE seated, 56bpm, 96% SpO2    Assessment: Addition of 2lb ankle weight for increased difficulty tolerated well  Slightly more reduced toe clearance  Especially with hurdles, improved toe clearance once ankle weights removed  Verbal cues for knee drive to facilitate more hip flexion and for increased step length on RLE  Physical cues provided for sit to stand with RLE set more posteriorly than LLE for more weight bearing  Has occasional scissoring gait which he needs stepping strategy to recover balance  Vitals stable post session  Plan:   Continue PT per POC  High intensity gait training as able  LE strengthening  L hip taping if possible       Precautions: Depression, Falls  Re-eval Date: 2022    Date      Visit Count 6 7 8     FOTO Performed       Pain In See IE       Pain Out                Testing        TUG 15 02  RW       5xSTS 09 54       Gait speed 0 53 m/s RW       Lin 33/56       2/6MWT 2mwt 177 ft RW       Manuals    L hip distraction AF     Neuro Re-Ed        Dynamic balance Hurdles solo step 10 laps no AD NV Hurdles 6 laps RLE leading 2lb aw  2 lap no aw    Solo step bwd gait 20ft x6 laps     Static balance        Obstacle course        Gait in solo step with focus on increased stride        Color cone taps         High intensity gait Weighted vest, uneven mat and hurdles on ground 8 laps cues for pacing no AD RTB 10 laps x2 solo step no AD cues for pacing, inc step length RPE 6-7 AFO    TM 1  6mph 1min x3 RPE 5 AFO used Ground level hurdles > uneven mat 2lb aw  cues for pacing, inc step length PLS AFO 4 laps x2 RPE 6-7    6 laps PT resistance solo step gait RPE 5     // bars Lateral stepping UE assist - 6 laps    Bwd UE assist - 8 laps       Ther Ex        SLR x 4        HR/TR        Sit to stand Weighted vest x30 Weighted vest x30 3x12 weighted vest     Step ups                                        Ther Activity        ADL                Gait Training SPC to ortho side verbal cues for step length and knee drive x2 SPC to ortho side verbal cues for step length and heel strike/knee drive x1      Divided Attention        Head turns        Modalities         prn

## 2022-02-09 NOTE — PROGRESS NOTES
Results for orders placed or performed in visit on 02/09/22   Cardiac EP device report    Narrative    [facility] OFFICE LOOP: WOUND CHECK: INCISION CLEAN AND DRY WITH EDGES APPROXIMATED; SUTURES REMOVED; WOUND CARE AND RESTRICTIONS REVIEWED WITH PATIENT  BATTERY VOLTAGE "OK"  PRESENTING RHYTHM: NSR @ 60 BPM  R-WAVES: 0 37mV  NO PATIENT OR DEVICE ACTIVATED EPISODES  NO PROGRAMMING CHANGES MADE TO DEVICE PARAMETERS  NORMAL DEVICE FUNCTION    [address] [place] [address]

## 2022-02-09 NOTE — PROGRESS NOTES
Daily Speech Treatment Note    Today's date: 2022  Patients name: Lizbeth Montalvo  : 1965  MRN: [id number]  Safety measures:   Referring provider: No ref  provider found    No diagnosis found  Visit Trackin    Subjective/Behavioral:  -  "Its a lot   "  Objective/Assessment:  Completed structured activities with patient to target goals below  Results as stated below  Short Term Goals:     1  Patient will perform oral motor exercises 30x each (with and without resistance) to facilitate improved strength and function for increased speech intelligibility  : Trained in and completed OMEs with mirror  Extra cueing for labial retraction  Provided handouts, reviewed with girlfriend, Rahul Box        2  Patient will utilize over-articulation & slow rate 80% of the time while orally reading words/phrases/sentences/paragraph length material to facilitate increased speech intelligibility with fading minimal cues  22: Introduced LOUD approach with loud, long /a/: 12 seconds average duration, 65 dB loudness, functional phrases 64 dB average, will work on pitches next session  Multisyllabic words and targeted /s/, /l/ /s blends/ 80% accuracy  Literal paraphasic errors noted less than 25% of the time with reading sentences in particular but improving  22: Loud /a/: 62-69 dB loudness with duration span from 6 4-11 4  Pitches: 108-164 75 average range, Functional Phrases:  65 dB  Words: 66 dB  Practiced finishing automatic phrases, concrete fluency naming with using loudness: moderate cueing required  : 105- 185 Hz, Loud a/ 69 38 at 10 11 second duration average, 71 dB- functional phrases  68 sentences  : Loud /a/: 3 2-13 seconds, 67-72 dB range, Pitches: High Pitch range: 144-186 and lower pitch range: 103-158 dB  67 4 dB functional phrases, Sentences: 66 dB, improved articulation noted- answering questions      3   Patient will complete structured oral reading and conversational tasks with the consistent use of compensatory strategies >80% of the time to facilitate increased speech intelligibility        4  Patient will perform oral motor and diadochokinetic speech rate exercises with > 90% accuracy to facilitate increased speech intelligibility  1/31/22: /l/ sentences / tongue twisters: 70% accuracy  2/7: Patient producing diadochokinetic speech rate tasks well with repetition, 70% accuracy with repetition       5  Further cognitive formal assessment as warranted           Long Term Goals:     1  Patient will independently utilize speech intelligibility strategies (e g , over-exaggeration, slow rate, breath groups, etc ) during oral reading tasks >90% intelligibility to facilitate increased generalization of skills into conversational speech by discharge       2  Patient will improve the ability to facilitate functional speech production skills for intelligible communication including compensatory strategies to promote quality of life and to maximize level of independence with communication  Plan:  -Continue with current plan of care  Working with multisyllabic words and sentences, LOUD approach   voice recording to increase insight/self monitoring, words-conversation  Functional phrases  Start to work on formulating functional responses given situation with LOUD strategy, other s blend sentences, LOUD carryover homework

## 2022-02-14 ENCOUNTER — OFFICE VISIT (OUTPATIENT)
Dept: PHYSICAL THERAPY | Facility: CLINIC | Age: 57
End: 2022-02-14
Payer: MEDICARE

## 2022-02-14 ENCOUNTER — OFFICE VISIT (OUTPATIENT)
Dept: SPEECH THERAPY | Facility: CLINIC | Age: 57
End: 2022-02-14

## 2022-02-14 DIAGNOSIS — I63.9 CEREBROVASCULAR ACCIDENT (CVA), UNSPECIFIED MECHANISM (HCC): ICD-10-CM

## 2022-02-14 DIAGNOSIS — R53.1 RIGHT SIDED WEAKNESS: Primary | ICD-10-CM

## 2022-02-14 DIAGNOSIS — R47.1 DYSARTHRIA: Primary | ICD-10-CM

## 2022-02-14 DIAGNOSIS — R53.83 DECREASED STAMINA: ICD-10-CM

## 2022-02-14 DIAGNOSIS — R26.89 BALANCE DISORDER: ICD-10-CM

## 2022-02-14 PROCEDURE — 97116 GAIT TRAINING THERAPY: CPT

## 2022-02-14 PROCEDURE — 92507 TX SP LANG VOICE COMM INDIV: CPT

## 2022-02-14 PROCEDURE — 97112 NEUROMUSCULAR REEDUCATION: CPT

## 2022-02-14 NOTE — PROGRESS NOTES
Reevaluation of Speech    Today's date: 2022  Patients name: Mati Nixon  : 1965  MRN: [id number]  Safety measures:   Referring provider: Jennifer Boo MD    Encounter Diagnosis     ICD-10-CM    1  Dysarthria  R47 1    2  Cerebrovascular accident (CVA), unspecified mechanism ([address] )  I63 9            Visit Trackin  Reeval 3/14/22    Subjective/Behavioral:  -  Patient noting improved speech, increased clarity but worse knees/pain in lower extremities- Physical therapy afterwards  Objective/Assessment:  Completed structured activities with patient to target goals below  Results as stated below  Short Term Goals:     1  Patient will perform oral motor exercises 30x each (with and without resistance) to facilitate improved strength and function for increased speech intelligibility  ACHIEVED, DISCONTINUE GOAL      2  Patient will utilize over-articulation & slow rate 80% of the time while orally reading words/phrases/sentences/paragraph length material to facilitate increased speech intelligibility with fading minimal cues  Practiced finishing automatic phrases, concrete fluency naming with using loudness: moderate cueing required  : Range of 6-12 3 seconds in duration of /a/, 71 dB, High Pitch average: 210, low pitch: 110  Functional Phrases: 72 dB  Sentences:72 dB - improved articulation and clarity  ACHIEVED AT WORD AND SENTENCE LEVEL, NEED TO WORK ON PARAGRAPH LENGTH      3  Patient will complete structured oral reading and conversational tasks with the consistent use of compensatory strategies >80% of the time to facilitate increased speech intelligibility  : Read 10+ word sentences with use of strategies 80% of the time with occasional minimal cues/correction for articulation  Conversation improved, approx  Using strategies 70% of the time  NOT ACHIEVED YET AT THIS TIME   IMPROVING INTELLIGIBILITY      4  Patient will perform oral motor and diadochokinetic speech rate exercises with > 90% accuracy to facilitate increased speech intelligibility  1/31/22: /l/ sentences / tongue twisters: 70% accuracy  2/7: Patient producing diadochokinetic speech rate tasks well with repetition, 70% accuracy with repetition  2/14/22: Utilizing 90% of the time  ACHIEVED     5  Further cognitive formal assessment as warranted  NOT  ACHIEVED  PATIENT NOT APPROPRIATE AT THIS TIME  PATIENT NOT INTERESTED  WILL REASSESS AT NEXT REEVALUATION IF APPROPRIATE         Long Term Goals:     1  Patient will independently utilize speech intelligibility strategies (e g , loud, over-exaggeration, slow rate, breath groups, etc ) during oral reading tasks >90% intelligibility to facilitate increased generalization of skills into conversational speech by discharge  CONTINUE GOAL     2  Patient will improve the ability to facilitate functional speech production skills for intelligible communication including compensatory strategies to promote quality of life and to maximize level of independence with communication  CONTINUE GOAL    IMPRESSION:    Patient completed 9 sessions of evaluation and motor speech therapy  Patient improved to moderate level dysarthria from initially severe dysarthria  Patient currently working on increasing independently utilizing LOUD approach & over-articulation at long [id number] level  Patient completing home exercises and making gains in clarity  Continue speech therapy  Recommendations:  -Patient would benefit from outpatient skilled Speech Therapy services: Speech-language therapy     -Frequency: 2x weekly  -Duration: 4-6 weeks     -Intervention certification from: 9/54/6673  -Intervention certification to: 6/29/78       Plan:  -Continue with current plan of care  Working with multisyllabic words and sentences, LOUD approach   voice recording to increase insight/self monitoring, sentences-conversation

## 2022-02-14 NOTE — PROGRESS NOTES
Daily Note     Today's date: 2022  Patient name: Shamar Damico  : 1965  MRN: [id number]  Referring provider: Brittney Hull MD  Dx:   Encounter Diagnosis     ICD-10-CM    1  Right sided weakness  R53 1    2  Balance disorder  R26 89    3  Decreased stamina  R53 83    4  Cerebrovascular accident (CVA), unspecified mechanism ([address] )  I63 9        Start Time: 1500  Stop Time: 1545  Total time in clinic (min): 45 minutes    Subjective:   Pt arrives with SPC  Pt notes over the weekend, his L knee was bothering him  His L hip and knee feel okay today but has been cautious over the past few days  Objective: See treatment diary below  Assessment: Addition of 2lb ankle weight for increased difficulty tolerated well  Focus on L hip stability with taping  Instructed on taking care of tape and precautions about heat and time to leave tape on  Given written handout regarding these as well  Pt verbalized understanding  Noted improvement in L hip pain while ambulating post taping  Still shows some instability with R LE WB noted with his trendelenburg and reduced RLE stance phase  Improvement in R toe clearance with hurdles and gait with continued practice  Plan:   Continue PT per POC  Re-assessment next visit       Precautions: Depression, Falls  Re-eval Date: 2022    Date     Visit Count 6 7 8 9    FOTO Performed       Pain In See IE       Pain Out                Testing        TUG 15 02  RW       5xSTS 09 54       Gait speed 0 53 m/s RW       Lin 33/56       2/6MWT 2mwt 177 ft RW       Manuals    L hip distraction AF L hip distraction AF    Neuro Re-Ed        Dynamic balance Hurdles solo step 10 laps no AD NV Hurdles 6 laps RLE leading 2lb aw  2 lap no aw    Solo step bwd gait 20ft x6 laps Hurdles low step-through 2lb aw on RLE 6 laps x2        Static balance    LLE on 4' step - focus on RLE WB x25 ball toss    Obstacle course        Gait in solo step with focus on increased stride        Color cone taps         High intensity gait Weighted vest, uneven mat and hurdles on ground 8 laps cues for pacing no AD RTB 10 laps x2 solo step no AD cues for pacing, inc step length RPE 6-7 AFO    TM 1  6mph 1min x3 RPE 5 AFO used Ground level hurdles > uneven mat 2lb aw  cues for pacing, inc step length PLS AFO 4 laps x2 RPE 6-7    6 laps PT resistance solo step gait RPE 5     // bars Lateral stepping UE assist - 6 laps    Bwd UE assist - 8 laps   // bwd gait 4 laps 1 UE    // lat steps 4 laps no UE    Ther Ex        SLR x 4        HR/TR        Sit to stand Weighted vest x30 Weighted vest x30 3x12 weighted vest 3x12 weighted vest RLE focus    Step ups                                        Ther Activity        ADL                Gait Training SPC to ortho side verbal cues for step length and knee drive x2 SPC to ortho side verbal cues for step length and heel strike/knee drive x1  SPC AFO to ortho side x3 verbal cues for knee drive, RLE WB in stance    Divided Attention        Head turns        Modalities         prn

## 2022-02-16 ENCOUNTER — OFFICE VISIT (OUTPATIENT)
Dept: PHYSICAL THERAPY | Facility: CLINIC | Age: 57
End: 2022-02-16
Payer: MEDICARE

## 2022-02-16 ENCOUNTER — OFFICE VISIT (OUTPATIENT)
Dept: SPEECH THERAPY | Facility: CLINIC | Age: 57
End: 2022-02-16

## 2022-02-16 DIAGNOSIS — R53.1 RIGHT SIDED WEAKNESS: Primary | ICD-10-CM

## 2022-02-16 DIAGNOSIS — I63.9 CEREBROVASCULAR ACCIDENT (CVA), UNSPECIFIED MECHANISM (HCC): ICD-10-CM

## 2022-02-16 DIAGNOSIS — R47.1 DYSARTHRIA: Primary | ICD-10-CM

## 2022-02-16 DIAGNOSIS — R53.83 DECREASED STAMINA: ICD-10-CM

## 2022-02-16 DIAGNOSIS — R26.89 BALANCE DISORDER: ICD-10-CM

## 2022-02-16 PROCEDURE — 97112 NEUROMUSCULAR REEDUCATION: CPT

## 2022-02-16 PROCEDURE — 92507 TX SP LANG VOICE COMM INDIV: CPT

## 2022-02-16 NOTE — PROGRESS NOTES
Daily Speech Treatment Note    Today's date: 2022  Patients name: Karen Banks  : 1965  MRN: [id number]  Safety measures:   Referring provider: Maya Gutiérrez MD    Encounter Diagnosis     ICD-10-CM    1  Dysarthria  R47 1    2  Cerebrovascular accident (CVA), unspecified mechanism ([address] )  I63 9          Visit Tracking:  10  Reeval 3/14/22      Subjective/Behavioral:  - Patient reported that he was running behind today  Feels like his speech therapy is going good  Objective/Assessment:  Completed structured activities with patient to target goals below  Results as stated below  Short Term Goals:    2  Patient will utilize over-articulation & slow rate 80% of the time while orally reading words/phrases/sentences/paragraph length material to facilitate increased speech intelligibility with fading minimal cues  Practiced finishing automatic phrases, concrete fluency naming with using loudness: moderate cueing required  : Range of 6-12 3 seconds in duration of /a/, 71 dB, High Pitch average: 210, low pitch: 110  Functional Phrases: 72 dB  Sentences:72 dB - improved articulation and clarity  ACHIEVED AT WORD AND SENTENCE LEVEL, NEED TO WORK ON PARAGRAPH LENGTH  : (Using Voice Analyst luis) Average of 11 12 seconds in duration of /a/, 72 83 dB average loudness, High Pitch average: 279 83 Hz, low pitch: 95 Hz  Functional Phrases: 77 dB  Paragraph: 76 dB  Patient demonstrated some difficulty in paragraph reading but was able to tolerate reading overall      3  Patient will complete structured oral reading and conversational tasks with the consistent use of compensatory strategies >80% of the time to facilitate increased speech intelligibility  : Read 10+ word sentences with use of strategies 80% of the time with occasional minimal cues/correction for articulation  Conversation improved, approx  Using strategies 70% of the time  NOT ACHIEVED YET AT THIS TIME   IMPROVING INTELLIGIBILITY  2/16: Patient read 10+ word sentences  Patient utilized strategies 60% of the time  Demonstrated occassions of reading past a word or not sounding out a word for accurate articulation  Patient required review of multisyllabic words  When reading through the sentence a second time, the patient was able to read with near perfect intelligibility  5  Further cognitive formal assessment as warranted  NOT  ACHIEVED  PATIENT NOT APPROPRIATE AT THIS TIME  PATIENT NOT INTERESTED  WILL REASSESS AT NEXT REEVALUATION IF APPROPRIATE           Long Term Goals:     1  Patient will independently utilize speech intelligibility strategies (e g , over-exaggeration, slow rate, breath groups, etc ) during oral reading tasks >90% intelligibility to facilitate increased generalization of skills into conversational speech by discharge  CONTINUE GOAL     2  Patient will improve the ability to facilitate functional speech production skills for intelligible communication including compensatory strategies to promote quality of life and to maximize level of independence with communication  CONTINUE GOAL    Plan:  -Continue with current plan of care  Working with multisyllabic words and sentences, LOUD approach   voice recording to increase insight/self monitoring, sentences-conversation

## 2022-02-16 NOTE — PROGRESS NOTES
PT Re-Evaluation     Today's date: 2022  Patient name: Pierre Sweet  : 1965  MRN: [id number]  Referring provider: Myah Fitzpatrick MD  Dx:   Encounter Diagnosis     ICD-10-CM    1  Right sided weakness  R53 1    2  Balance disorder  R26 89    3  Decreased stamina  R53 83    4  Cerebrovascular accident (CVA), unspecified mechanism ([facility] [address] )  I63 9        Start Time: 1245  Stop Time: 1330  Total time in clinic (min): 45 minutes    Assessment  Assessment details: Patient is a 65y o  year old male presenting to OPPT s/p diagnosis of CVA  Pt has made steady progress and has went from walking with a RW to using a SPC  He also has gotten a new PLS AFO, which assists with his R foot clearance  He is compliant with his walking program and HEP  He still presents with R trendelenburg gait most likely secondary to hip weakness  His gait speed is now classified as an unlimited community ambulator  His balance, mobility, and endurance outcome measures have improved however, still shows deficits with his gait stability and endurance  He has reported some L hip pain with walking  He will benefit from skilled PT interventions to maximize return to PLOF and independence as able  Thank you for this referral and the ability to participate in the care of this patient  Continue per plan of care  Impairments: abnormal gait, activity intolerance, impaired balance, impaired physical strength, lacks appropriate home exercise program and safety issue  Understanding of Dx/Px/POC: good   Prognosis: good    Goals  In 4 weeks, patient will:  1  Pt will achieve MDC value, 2 9s, on TUG using least restrictive device to demonstrate reduced fall risk - met  2  Improve 5xSTS by at least 2 seconds to demonstrate improved lower extremity strength and support - met  3  Demonstrate increased strength of R lower extremity by at least 1/2 grade to allow for improved transfer quality and stability - not met  4    Demonstrate ability to perform 20 minutes of activity without requiring seated rest break  - met    In 4-10 weeks, patient will:  1  Meet gait speed of 0 70 m/s with least restrictive device to meet MDC value and reduce risk of falls  - met  2  Improve Lin balance score by at least 5 points to meet MDC value and show improved balance with ADLs  3   Report independence with walking program and consistent carryover with HEP  4   Improve 2MWT by at least 40ft with least restrictive device to show improved endurance to complete ADLs  5  Improve FOTO score to predicted value to demonstrate improved functional mobility  Plan  Patient would benefit from: skilled physical therapy  Other planned modality interventions: as needed  Planned therapy interventions: neuromuscular re-education, manual therapy, patient education, home exercise program, therapeutic exercise, therapeutic activities and gait training  Frequency: 2x week  Duration in weeks: 8  Plan of Care beginning date: 1/17/2022  Plan of Care expiration date: 3/18/2022  Treatment plan discussed with: patient and family        Subjective Evaluation    History of Present Illness  Mechanism of injury: Update 2/16: Pt denies falls  Overall feels like things are moving in the right direction  Has received new AFO from   Wears the brace at home a lot  His back has been bothering him more recently  Present at PT: Pt reports to oupt with his significant other eulalia  Reports that he was at inpatient rehab for 2 weeks  Had all disciplines there as well as with home health  Was dc'd last Friday  Using a RW to mobilize around the house  Notes his depression has been getting in the way of being more active  Feels like he just wants to lay around a lot  Did get afo while in rehab (on dc day)  Has not been wearing it as it is uncomfortable  Using shower chair  Reacher to put shoes on  Has difficulty balancing to get dressed  Needs to sit frequently while doing ADLs    Pain  No pain reported    Social Support  Steps to enter house: yes  3  Stairs in house: yes   12  Lives with: spouse    Employment status: not working (disability)  Treatments  Previous treatment: physical therapy, speech therapy and occupational therapy  Current treatment: speech therapy  Discharged from (in last 30 days): home health care  Patient Goals  Patient goals for therapy: increased strength, improved balance and independence with ADLs/IADLs  Patient goal: "Maybe walk without the walker"        Objective     Strength/Myotome Testing     Left Hip   Planes of Motion   Flexion: 4+  Extension: 4+  Abduction: 4+    Right Hip   Planes of Motion   Flexion: 4-  Extension: 4-  Abduction: 4-    Left Knee   Extension: 4+    Right Knee   Extension: 4-    Left Ankle/Foot   Dorsiflexion: 4+  Plantar flexion: 4+    Right Ankle/Foot   Dorsiflexion: 3  Plantar flexion: 3  Neuro Exam:     Functional outcomes   Gait speed: 0 85 m/s  5x sit to stand: 06 69 more L weight shift (seconds)  2 minute walk test: 320 ft w SPC  TU 01 SPC, AFO (seconds)  Functional outcome comment: See Kenia in flowsheet  Functional outcome gait comment: Pt ambulates with RW  R hip circumduction and R trendelenburg  Wearing PLS AFO, pt reports use at home  Noted a few instances of issue with toe clearance               Precautions: Depression, Falls  Re-eval Date: 3/18/2022    Date    Visit Count 6 7 8 9 10   FOTO Performed       Pain In See IE       Pain Out                Testing        TUG 15 02  RW       5xSTS 09 54       Gait speed 0 53 m/s RW       Lin 33/56       2/6MWT 2mwt 177 ft RW       Manuals    L hip distraction AF L hip distraction AF    Neuro Re-Ed        Dynamic balance Hurdles solo step 10 laps no AD NV Hurdles 6 laps RLE leading 2lb aw  2 lap no aw    Solo step bwd gait 20ft x6 laps Hurdles low step-through 2lb aw on RLE 6 laps x2     Hurdles + compliant surface 6 laps x2   Static balance    LLE on 4' step - focus on RLE WB x25 ball toss    Obstacle course        Gait in solo step with focus on increased stride        Color cone taps         High intensity gait Weighted vest, uneven mat and hurdles on ground 8 laps cues for pacing no AD RTB 10 laps x2 solo step no AD cues for pacing, inc step length RPE 6-7 AFO    TM 1  6mph 1min x3 RPE 5 AFO used Ground level hurdles > uneven mat 2lb aw  cues for pacing, inc step length PLS AFO 4 laps x2 RPE 6-7    6 laps PT resistance solo step gait RPE 5     // bars Lateral stepping UE assist - 6 laps    Bwd UE assist - 8 laps   // bwd gait 4 laps 1 UE    // lat steps 4 laps no UE    Ther Ex        SLR x 4        HR/TR        Sit to stand Weighted vest x30 Weighted vest x30 3x12 weighted vest 3x12 weighted vest RLE focus 3x12 RLE focus   Step ups                                        Ther Activity        ADL                Gait Training SPC to ortho side verbal cues for step length and knee drive x2 SPC to ortho side verbal cues for step length and heel strike/knee drive x1  SPC AFO to ortho side x3 verbal cues for knee drive, RLE WB in stance    Divided Attention        Head turns        Modalities         prn

## 2022-02-17 ENCOUNTER — TELEPHONE (OUTPATIENT)
Dept: SLEEP CENTER | Facility: CLINIC | Age: 57
End: 2022-02-17

## 2022-02-17 DIAGNOSIS — R06.81 WITNESSED EPISODE OF APNEA: Primary | ICD-10-CM

## 2022-02-17 DIAGNOSIS — I63.9 ACUTE CVA (CEREBROVASCULAR ACCIDENT) (HCC): ICD-10-CM

## 2022-02-17 DIAGNOSIS — F51.19 OTHER HYPERSOMNIA NOT DUE TO A SUBSTANCE OR KNOWN PHYSIOLOGICAL CONDITION: ICD-10-CM

## 2022-02-17 NOTE — TELEPHONE ENCOUNTER
----- Message from Ford Samaniego MD sent at 2/16/2022 10:00 AM EST -----  Denied, the patient needs an in Lab sleep study  ----- Message -----  From: Kar Ruff  Sent: 2/16/2022   7:25 AM EST  To: Sleep Medicine [facility] Provider    This sleep study needs approval      If approved please sign and return to clerical pool  If denied please include reasons why  Also provide alternative testing if warranted  Please sign and return to clerical pool

## 2022-02-18 DIAGNOSIS — M47.896 OTHER OSTEOARTHRITIS OF SPINE, LUMBAR REGION: ICD-10-CM

## 2022-02-18 DIAGNOSIS — M1A.00X0 IDIOPATHIC CHRONIC GOUT WITHOUT TOPHUS, UNSPECIFIED SITE: ICD-10-CM

## 2022-02-18 RX ORDER — DULOXETIN HYDROCHLORIDE 60 MG/1
60 CAPSULE, DELAYED RELEASE ORAL DAILY
Qty: 90 CAPSULE | Refills: 0 | OUTPATIENT
Start: 2022-02-18

## 2022-02-18 RX ORDER — ALLOPURINOL 100 MG/1
100 TABLET ORAL DAILY
Qty: 90 TABLET | Refills: 0 | OUTPATIENT
Start: 2022-02-18

## 2022-02-21 ENCOUNTER — OFFICE VISIT (OUTPATIENT)
Dept: SPEECH THERAPY | Facility: CLINIC | Age: 57
End: 2022-02-21

## 2022-02-21 ENCOUNTER — OFFICE VISIT (OUTPATIENT)
Dept: PHYSICAL THERAPY | Facility: CLINIC | Age: 57
End: 2022-02-21
Payer: MEDICARE

## 2022-02-21 DIAGNOSIS — I63.9 CEREBROVASCULAR ACCIDENT (CVA), UNSPECIFIED MECHANISM (HCC): ICD-10-CM

## 2022-02-21 DIAGNOSIS — R47.1 DYSARTHRIA: Primary | ICD-10-CM

## 2022-02-21 DIAGNOSIS — R26.89 BALANCE DISORDER: ICD-10-CM

## 2022-02-21 DIAGNOSIS — R53.1 RIGHT SIDED WEAKNESS: Primary | ICD-10-CM

## 2022-02-21 DIAGNOSIS — R53.83 DECREASED STAMINA: ICD-10-CM

## 2022-02-21 PROCEDURE — 97112 NEUROMUSCULAR REEDUCATION: CPT

## 2022-02-21 PROCEDURE — 92507 TX SP LANG VOICE COMM INDIV: CPT

## 2022-02-21 PROCEDURE — 97116 GAIT TRAINING THERAPY: CPT

## 2022-02-21 NOTE — PROGRESS NOTES
Daily Speech Treatment Note    Today's date: 2022  Patients name: Mely Ag  : 1965  MRN: [id number]  Safety measures:   Referring provider: Greg Swann MD    Encounter Diagnosis     ICD-10-CM    1  Dysarthria  R47 1    2  Cerebrovascular accident (CVA), unspecified mechanism ([facility] [address] )  I63 9          Visit Trackin  Reeval 3/14/22      Subjective/Behavioral:  - Patient noting he feels "useless "    Objective/Assessment:  Completed structured activities with patient to target goals below  Results as stated below  Short Term Goals:    1  Patient will utilize over-articulation & slow rate 80% of the time while orally reading @ sentences/paragraph length material to facilitate increased speech intelligibility with fading minimal cues  Practiced finishing automatic phrases, concrete fluency naming with using loudness: moderate cueing required  :  Average of 11 12 seconds in duration of /a/, 72 83 dB average loudness, High Pitch average: 279 83 Hz, low pitch: 95 Hz  Functional Phrases: 77 dB  Paragraph: 76 dB  Patient demonstrated some difficulty in paragraph reading but was able to tolerate reading overall  : /a/ 9 68 duration, 70 30     236 45 High Pitch, 112 44, Functional Phrases: 74 5 dB  70 dB sentences       2  Patient will complete structured oral reading and conversational tasks with the consistent use of compensatory strategies >80% of the time to facilitate increased speech intelligibility  : Patient read 10+ word sentences  Patient utilized strategies 60% of the time  Demonstrated occassions of reading past a word or not sounding out a word for accurate articulation  Patient required review of multisyllabic words  When reading through the sentence a second time, the patient was able to read with near perfect intelligibility   : Utilizing overarticulation with multisyllabic words: 95% of the time, patient has learned to slow down and take time to process the sounds, letters and articulation, improved reading at word level noted  Sentences: 80% accuracy  Paragraph level, close to 65% accuracy             Long Term Goals:     1  Patient will independently utilize speech intelligibility strategies (e g , over-exaggeration, slow rate, breath groups, etc ) during oral reading tasks >90% intelligibility to facilitate increased generalization of skills into conversational speech by discharge       2  Patient will improve the ability to facilitate functional speech production skills for intelligible communication including compensatory strategies to promote quality of life and to maximize level of independence with communication  Plan:  -Continue with current plan of care  Working with multisyllabic words and sentences, LOUD approach   voice recording to increase insight/self monitoring, sentences-conversation

## 2022-02-21 NOTE — PROGRESS NOTES
Daily Note     Today's date: 2022  Patient name: Brissa Álvarez  : 1965  MRN: [id number]  Referring provider: Tim Fields MD  Dx:   Encounter Diagnosis     ICD-10-CM    1  Right sided weakness  R53 1    2  Balance disorder  R26 89    3  Decreased stamina  R53 83    4  Cerebrovascular accident (CVA), unspecified mechanism ([address] )  I63 9        Start Time: 1500  Stop Time: 1545  Total time in clinic (min): 45 minutes    Subjective: Pt reports nothing new  Some L knee pain anteriorly but nothing too much  Objective: See treatment diary below      Assessment:  Pt shows improved endurance with gait training outdoors with SPC  Occasional imbalance with uneven surfaces, verbal cues for environment negotiation, as patient tends to not scan environment for safety  Some imbalance with stairs and handrail, reciprocal pattern used  Most likely d/t hip weakness  Shows improved foot clearance with hurdles, even with 2lb ankle weights  Shows good response to glut med tapping with open chain straight leg raise  Physical cues for more upright trunk to avoid compensation  Shows good tolerance to weight bearing through RLE with reaching tasks, no loss of balance  Patient demonstrated fatigue post treatment and would benefit from continued PT      Plan: Continue per plan of care  Continue high intensity gait training  Weight on RLE  RLE WB       Precautions: Depression, Falls  Re-eval Date: 3/18/2022    Date        Visit Count 11       FOTO NV       Pain In        Pain Out                Testing        TUG 08 01 S PC       5xSTS 06 69 s       Gait speed 0 85 m/s SPC       Lin 33/56       2/6MWT 2mwt 320 ft RW       Manuals         Neuro Re-Ed        Dynamic balance        Static balance Solo step RLE focus 4x8 4' step reaching cones       Obstacle course        Gait in solo step with focus on increased stride        Color cone taps         High intensity gait Solo step 5 hurdles + 4 in step 4 laps x1, 6 laps x1 2lb ankle weight    CGA Stairs x4 2lb ankle weight       // bars        Ther Ex        SLR x 4 SLR standing RLE x10 ext       HR/TR        Sit to stand        Step ups                                        Ther Activity        ADL                Gait Training CGA SPC outside sidewalks, curbs, uneven surfaces 10min       Divided Attention        Head turns        Modalities         prn

## 2022-02-23 ENCOUNTER — OFFICE VISIT (OUTPATIENT)
Dept: PHYSICAL THERAPY | Facility: CLINIC | Age: 57
End: 2022-02-23
Payer: MEDICARE

## 2022-02-23 ENCOUNTER — APPOINTMENT (OUTPATIENT)
Dept: SPEECH THERAPY | Facility: CLINIC | Age: 57
End: 2022-02-23
Payer: MEDICARE

## 2022-02-23 ENCOUNTER — APPOINTMENT (OUTPATIENT)
Dept: PHYSICAL THERAPY | Facility: CLINIC | Age: 57
End: 2022-02-23
Payer: MEDICARE

## 2022-02-23 ENCOUNTER — OFFICE VISIT (OUTPATIENT)
Dept: SPEECH THERAPY | Facility: CLINIC | Age: 57
End: 2022-02-23

## 2022-02-23 DIAGNOSIS — I63.9 CEREBROVASCULAR ACCIDENT (CVA), UNSPECIFIED MECHANISM (HCC): ICD-10-CM

## 2022-02-23 DIAGNOSIS — R53.1 RIGHT SIDED WEAKNESS: Primary | ICD-10-CM

## 2022-02-23 DIAGNOSIS — R53.83 DECREASED STAMINA: ICD-10-CM

## 2022-02-23 DIAGNOSIS — R26.89 BALANCE DISORDER: ICD-10-CM

## 2022-02-23 DIAGNOSIS — R47.1 DYSARTHRIA: Primary | ICD-10-CM

## 2022-02-23 PROCEDURE — 97116 GAIT TRAINING THERAPY: CPT

## 2022-02-23 PROCEDURE — 97112 NEUROMUSCULAR REEDUCATION: CPT

## 2022-02-23 PROCEDURE — 97140 MANUAL THERAPY 1/> REGIONS: CPT

## 2022-02-23 NOTE — PROGRESS NOTES
Daily Speech Treatment Note    Today's date: 2022  Patients name: Gretta Lin  : 1965  MRN: [id number]  Safety measures:   Referring provider: Rosemarie Guidry MD    Encounter Diagnosis     ICD-10-CM    1  Dysarthria  R47 1    2  Cerebrovascular accident (CVA), unspecified mechanism ([facility] [address] )  I63 9          Visit Trackin  Reeval 3/14/22      Subjective/Behavioral:  - Patient noting he didn't "feel right" end of the session, notified girlfriend, Shine Zhou  Objective/Assessment:  Completed structured activities with patient to target goals below  Results as stated below  Short Term Goals:    1  Patient will utilize over-articulation & slow rate 80% of the time while orally reading @ sentences/paragraph length material to facilitate increased speech intelligibility with fading minimal cues  Practiced finishing automatic phrases, concrete fluency naming with using loudness: moderate cueing required  :  Average of 11 12 seconds in duration of /a/, 72 83 dB average loudness, High Pitch average: 279 83 Hz, low pitch: 95 Hz  Functional Phrases: 77 dB  Paragraph: 76 dB  Patient demonstrated some difficulty in paragraph reading but was able to tolerate reading overall  : /a/ 9 68 duration, 70 30     236 45 High Pitch, 112 44, Functional Phrases: 74 5 dB  70 dB sentences  : 70 dB /a/, high pitch 248, lower pitch 121 Hz  Patient at 80% accuracy with minimal cues, difficulty at paragraph level - deferring paragraph level at this time  Education on abdominal breathing to increase breath support and loudness, patient able to elicit this type of breathing while lying down and sitting up following instruction      2  Patient will complete structured oral reading and conversational tasks with the consistent use of compensatory strategies >80% of the time to facilitate increased speech intelligibility  : Patient read 10+ word sentences  Patient utilized strategies 60% of the time  Demonstrated occassions of reading past a word or not sounding out a word for accurate articulation  Patient required review of multisyllabic words  When reading through the sentence a second time, the patient was able to read with near perfect intelligibility  2/21: Utilizing overarticulation with multisyllabic words: 95% of the time, patient has learned to slow down and take time to process the sounds, letters and articulation, improved reading at word level noted  Sentences: 80% accuracy  Paragraph level, close to 65% accuracy  2/22: Formulation of verbal responses given scenario- Minimal cues/modeling to overarticulate certain words to increase clarity               Long Term Goals:     1  Patient will independently utilize speech intelligibility strategies (e g , over-exaggeration, slow rate, breath groups, etc ) during oral reading tasks >90% intelligibility to facilitate increased generalization of skills into conversational speech by discharge       2  Patient will improve the ability to facilitate functional speech production skills for intelligible communication including compensatory strategies to promote quality of life and to maximize level of independence with communication  Plan:  -Continue with current plan of care  Working with multisyllabic words and sentences, LOUD approach   voice recording to increase insight/self monitoring, sentences-conversation   Verbal responses,

## 2022-02-23 NOTE — PROGRESS NOTES
Daily Note     Today's date: 2022  Patient name: Willian Hernandez  : 1965  MRN: [id number]  Referring provider: Vonda Dutton MD  Dx:   Encounter Diagnosis     ICD-10-CM    1  Right sided weakness  R53 1    2  Balance disorder  R26 89    3  Decreased stamina  R53 83    4  Cerebrovascular accident (CVA), unspecified mechanism ([address] )  I63 9        Start Time: 1415  Stop Time: 1500  Total time in clinic (min): 45 minutes    Subjective: Pt reports he had a stress test today  Not feeling too well  Feeling kind of lightheaded  His L knee and hip have been bothering him the past few days  Objective: See treatment diary below  Presession BP: 97/61 LUE seated  Post session BP: 124/81 LUE seated    Assessment:  Pt arrives with lowered blood pressure  Pt reports that he has no history of low blood pressure  Pt instructed to further monitor his blood pressure at home  L sided LE pain may be secondary to compensation  Requires min assistance with SLR abduction on R side, does well with tapping cues for muscle contraction  Focus on L hip stability with taping  Instructed on taking care of tape and precautions about heat and time to leave tape on  Given written handout regarding these as well  Shows improvement in lateral control with gait post taping, may be due to muscular facilitation  Patient demonstrated fatigue post treatment and would benefit from continued PT      Plan: Continue per plan of care  Continue high intensity gait training  Weight on RLE  RLE WB  As able       Precautions: Depression, Falls  Re-eval Date: 3/18/2022    Date       Visit Count 11 12      FOTO  NV      Pain In        Pain Out                Testing        TUG 08 01 S PC       5xSTS 06 69 s       Gait speed 0 85 m/s SPC       Lin 33/56       2/6MWT 2mwt 320 ft RW       Manuals   LLE quad str, calf str, hip distraction AF      Neuro Re-Ed        Dynamic balance  Hurdles + 4' step up x8 laps solo step      Static balance Solo step RLE focus 4x8 4' step reaching cones       Obstacle course        Gait in solo step with focus on increased stride        Color cone taps         High intensity gait Solo step 5 hurdles + 4 in step 4 laps x1, 6 laps x1 2lb ankle weight    CGA Stairs x4 2lb ankle weight       // bars        Ther Ex        SLR x 4 SLR standing RLE x10 ext Side lying RLE x20 min PT assist - tapping cues      HR/TR        Sit to stand        Step ups                                        Ther Activity        ADL                Gait Training CGA SPC outside sidewalks, curbs, uneven surfaces 10min CGA SPC ortho side x1    CGA no AD ortho side x1      Divided Attention        Head turns        Modalities         prn

## 2022-02-24 PROCEDURE — 92507 TX SP LANG VOICE COMM INDIV: CPT

## 2022-02-28 ENCOUNTER — APPOINTMENT (OUTPATIENT)
Dept: PHYSICAL THERAPY | Facility: CLINIC | Age: 57
End: 2022-02-28
Payer: MEDICARE

## 2022-02-28 ENCOUNTER — OFFICE VISIT (OUTPATIENT)
Dept: SPEECH THERAPY | Facility: CLINIC | Age: 57
End: 2022-02-28

## 2022-02-28 ENCOUNTER — OFFICE VISIT (OUTPATIENT)
Dept: PHYSICAL THERAPY | Facility: CLINIC | Age: 57
End: 2022-02-28
Payer: MEDICARE

## 2022-02-28 ENCOUNTER — APPOINTMENT (OUTPATIENT)
Dept: SPEECH THERAPY | Facility: CLINIC | Age: 57
End: 2022-02-28
Payer: MEDICARE

## 2022-02-28 DIAGNOSIS — I63.9 CEREBROVASCULAR ACCIDENT (CVA), UNSPECIFIED MECHANISM (HCC): ICD-10-CM

## 2022-02-28 DIAGNOSIS — R26.89 BALANCE DISORDER: ICD-10-CM

## 2022-02-28 DIAGNOSIS — R53.1 RIGHT SIDED WEAKNESS: Primary | ICD-10-CM

## 2022-02-28 DIAGNOSIS — R47.1 DYSARTHRIA: Primary | ICD-10-CM

## 2022-02-28 DIAGNOSIS — R53.83 DECREASED STAMINA: ICD-10-CM

## 2022-02-28 PROCEDURE — 97110 THERAPEUTIC EXERCISES: CPT

## 2022-02-28 PROCEDURE — 97112 NEUROMUSCULAR REEDUCATION: CPT

## 2022-02-28 PROCEDURE — 92507 TX SP LANG VOICE COMM INDIV: CPT

## 2022-02-28 PROCEDURE — 97116 GAIT TRAINING THERAPY: CPT

## 2022-02-28 NOTE — PROGRESS NOTES
Daily Speech Treatment Note    Today's date: 2022  Patients name: Farshad Nava  : 1965  MRN: [id number]  Safety measures:   Referring provider: Jesus Love MD    Encounter Diagnosis     ICD-10-CM    1  Dysarthria  R47 1    2  Cerebrovascular accident (CVA), unspecified mechanism ([facility] )  I63 9          Visit Trackin  Reeval 3/14/22      Subjective/Behavioral:  - Patient pleasant/cooperative  Objective/Assessment:  Completed structured activities with patient to target goals below  Results as stated below  Short Term Goals:    1  Patient will utilize over-articulation & slow rate 80% of the time while orally reading @ sentences/paragraph length material to facilitate increased speech intelligibility with fading minimal cues  Practiced finishing automatic phrases, concrete fluency naming with using loudness: moderate cueing required  :  Average of 11 12 seconds in duration of /a/, 72 83 dB average loudness, High Pitch average: 279 83 Hz, low pitch: 95 Hz  Functional Phrases: 77 dB  Paragraph: 76 dB  Patient demonstrated some difficulty in paragraph reading but was able to tolerate reading overall  : /a/ 9 68 duration, 70 30     236 45 High Pitch, 112 44, Functional Phrases: 74 5 dB  70 dB sentences  : 70 dB /a/, high pitch 248, lower pitch 121 Hz  Patient at 80% accuracy with minimal cues, difficulty at paragraph level - deferring paragraph level at this time  Education on abdominal breathing to increase breath support and loudness, patient able to elicit this type of breathing while lying down and sitting up following instruction  : Loud /a/: 14 seconds duration, 70 dB , high pitch: 240 Hz, low pitch: 115 Hz        2  Patient will complete structured oral reading and conversational tasks with the consistent use of compensatory strategies >80% of the time to facilitate increased speech intelligibility  : Patient read 10+ word sentences   Patient utilized strategies 60% of the time  Demonstrated occassions of reading past a word or not sounding out a word for accurate articulation  Patient required review of multisyllabic words  When reading through the sentence a second time, the patient was able to read with near perfect intelligibility  2/21: Utilizing overarticulation with multisyllabic words: 95% of the time, patient has learned to slow down and take time to process the sounds, letters and articulation, improved reading at word level noted  Sentences: 80% accuracy  Paragraph level, close to 65% accuracy  2/22: Formulation of verbal responses given scenario- Minimal cues/modeling to overarticulate certain words to increase clarity  2/28: Conversation, answering personal questions: 90% acc  Personal discussion: Approximate use of strategies 80% of the time                Long Term Goals:     1  Patient will independently utilize speech intelligibility strategies (e g , over-exaggeration, slow rate, breath groups, etc ) during oral reading tasks >90% intelligibility to facilitate increased generalization of skills into conversational speech by discharge       2  Patient will improve the ability to facilitate functional speech production skills for intelligible communication including compensatory strategies to promote quality of life and to maximize level of independence with communication  Plan:  -Continue with current plan of care  Working with multisyllabic words and sentences, LOUD approach   voice recording to increase insight/self monitoring, sentences-conversation   Verbal responses,

## 2022-02-28 NOTE — PROGRESS NOTES
Daily Note     Today's date: 2022  Patient name: Bonifacio Vital  : 1965  MRN: [id number]  Referring provider: Lan Aggarwal MD  Dx:   Encounter Diagnosis     ICD-10-CM    1  Right sided weakness  R53 1    2  Balance disorder  R26 89    3  Decreased stamina  R53 83    4  Cerebrovascular accident (CVA), unspecified mechanism ([address] )  I63 9        Start Time: 1515  Stop Time: 1600  Total time in clinic (min): 45 minutes    Subjective: Pt reports he feels better than he did last time  Is not having as much L hip and knee pain at this moment  Took it easier over the weekend  No PLS AFO  Objective: See treatment diary below    Assessment:  Trial of NMES with glute med focus to work on trendelenburg gait pattern  Tolerates increase with treadmill speed well, responds well to cues for knee drive to clear R foot  Loss of form in regard to foot clearance and gait stability  Working at higher treadmill speeds with increased stability  Min increase in L hip and knee pain post gait sessions  Mod verbal cues for R foot position  Encouraged to bring AFO next visit  Patient demonstrated fatigue post treatment and would benefit from continued PT      Plan: Continue per plan of care  Continue high intensity gait training  Treadmill as able  Hip taping       Precautions: Depression, Falls  Re-eval Date: 3/18/2022    Date      Visit Count 11 12 13     FOTO   NV     Pain In        Pain Out                Testing        TUG 08 01 S PC       5xSTS 06 69 s       Gait speed 0 85 m/s SPC       Lin 33/56       2/6MWT 2mwt 320 ft RW       Manuals   LLE quad str, calf str, hip distraction AF      Neuro Re-Ed        Dynamic balance  Hurdles + 4' step up x8 laps solo step      Static balance Solo step RLE focus 4x8 4' step reaching cones       Obstacle course        Gait in solo step with focus on increased stride        Color cone taps         High intensity gait Solo step 5 hurdles + 4 in step 4 laps x1, 6 laps x1 2lb ankle weight    CGA Stairs x4 2lb ankle weight  TM solo 1  6mph 2min  1  8mph 2min 10s  2 0mph 2min       // bars        Ther Ex        SLR x 4 SLR standing RLE x10 ext Side lying RLE x20 min PT assist - tapping cues With NMES x10     HR/TR        Sit to stand   3x15 cues for foot positioning     Step ups                                        Ther Activity        ADL                Gait Training CGA SPC outside sidewalks, curbs, uneven surfaces 10min CGA SPC ortho side x1    CGA no AD ortho side x1 CGA SPC 130ft x2     Divided Attention        Head turns        Modalities         prn

## 2022-03-02 ENCOUNTER — APPOINTMENT (OUTPATIENT)
Dept: PHYSICAL THERAPY | Facility: CLINIC | Age: 57
End: 2022-03-02
Payer: MEDICARE

## 2022-03-02 ENCOUNTER — APPOINTMENT (OUTPATIENT)
Dept: SPEECH THERAPY | Facility: CLINIC | Age: 57
End: 2022-03-02
Payer: MEDICARE

## 2022-03-03 ENCOUNTER — EVALUATION (OUTPATIENT)
Dept: OCCUPATIONAL THERAPY | Facility: CLINIC | Age: 57
End: 2022-03-03
Payer: MEDICARE

## 2022-03-03 ENCOUNTER — OFFICE VISIT (OUTPATIENT)
Dept: SPEECH THERAPY | Facility: CLINIC | Age: 57
End: 2022-03-03
Payer: MEDICARE

## 2022-03-03 ENCOUNTER — OFFICE VISIT (OUTPATIENT)
Dept: PHYSICAL THERAPY | Facility: CLINIC | Age: 57
End: 2022-03-03
Payer: MEDICARE

## 2022-03-03 DIAGNOSIS — R53.1 RIGHT SIDED WEAKNESS: Primary | ICD-10-CM

## 2022-03-03 DIAGNOSIS — I63.9 CEREBROVASCULAR ACCIDENT (CVA), UNSPECIFIED MECHANISM (HCC): ICD-10-CM

## 2022-03-03 DIAGNOSIS — R26.89 BALANCE DISORDER: ICD-10-CM

## 2022-03-03 DIAGNOSIS — R53.83 DECREASED STAMINA: ICD-10-CM

## 2022-03-03 DIAGNOSIS — G81.91 HEMIPLEGIA AFFECTING RIGHT DOMINANT SIDE, UNSPECIFIED ETIOLOGY, UNSPECIFIED HEMIPLEGIA TYPE (HCC): Primary | ICD-10-CM

## 2022-03-03 DIAGNOSIS — R47.1 DYSARTHRIA: Primary | ICD-10-CM

## 2022-03-03 PROCEDURE — 97112 NEUROMUSCULAR REEDUCATION: CPT

## 2022-03-03 PROCEDURE — 97110 THERAPEUTIC EXERCISES: CPT

## 2022-03-03 PROCEDURE — 97167 OT EVAL HIGH COMPLEX 60 MIN: CPT

## 2022-03-03 PROCEDURE — 92507 TX SP LANG VOICE COMM INDIV: CPT

## 2022-03-03 NOTE — PROGRESS NOTES
Daily Speech Treatment Note    Today's date: 3/3/2022  Patients name: Willian Hernandez  : 1965  MRN: [id number]  Safety measures:   Referring provider: Vonda Dutton MD    Encounter Diagnosis     ICD-10-CM    1  Dysarthria  R47 1    2  Cerebrovascular accident (CVA), unspecified mechanism ([facility] [address] )  I63 9          Visit Trackin  Reeval 3/14/22      Subjective/Behavioral:  - Patient pleasant/cooperative  Objective/Assessment:  Completed structured activities with patient to target goals below  Results as stated below  Short Term Goals:    1  Patient will utilize over-articulation & slow rate 80% of the time while orally reading @ sentences/paragraph length material to facilitate increased speech intelligibility with fading minimal cues  Practiced finishing automatic phrases, concrete fluency naming with using loudness: moderate cueing required  :  Average of 11 12 seconds in duration of /a/, 72 83 dB average loudness, High Pitch average: 279 83 Hz, low pitch: 95 Hz  Functional Phrases: 77 dB  Paragraph: 76 dB  Patient demonstrated some difficulty in paragraph reading but was able to tolerate reading overall  : /a/ 9 68 duration, 70 30     236 45 High Pitch, 112 44, Functional Phrases: 74 5 dB  70 dB sentences  : 70 dB /a/, high pitch 248, lower pitch 121 Hz  Patient at 80% accuracy with minimal cues, difficulty at paragraph level - deferring paragraph level at this time  Education on abdominal breathing to increase breath support and loudness, patient able to elicit this type of breathing while lying down and sitting up following instruction  : Loud /a/: 14 seconds duration, 70 dB , high pitch: 240 Hz, low pitch: 115 Hz   3/3: Loud /a/: 13 39, 69 dB,  155 64 Hz - 130 Hz Pitch range, 72 40 dB in functional phrases    Oral reading sentence level: using strategies 70% of the time      2  Patient will complete structured oral reading and conversational tasks with the consistent use of compensatory strategies >80% of the time to facilitate increased speech intelligibility  2/16: Patient read 10+ word sentences  Patient utilized strategies 60% of the time  Demonstrated occassions of reading past a word or not sounding out a word for accurate articulation  Patient required review of multisyllabic words  When reading through the sentence a second time, the patient was able to read with near perfect intelligibility  2/21: Utilizing overarticulation with multisyllabic words: 95% of the time, patient has learned to slow down and take time to process the sounds, letters and articulation, improved reading at word level noted  Sentences: 80% accuracy  Paragraph level, close to 65% accuracy  2/22: Formulation of verbal responses given scenario- Minimal cues/modeling to overarticulate certain words to increase clarity  2/28: Conversation, answering personal questions: 90% acc  Personal discussion: Approximate use of strategies 80% of the time  3/3/22: Conversational tasks: Benefits from cues / reminders for increased loudness- improves to functional loudness level with verbal cue/occasional modeling             Long Term Goals:     1  Patient will independently utilize speech intelligibility strategies (e g , over-exaggeration, slow rate, breath groups, etc ) during oral reading tasks >90% intelligibility to facilitate increased generalization of skills into conversational speech by discharge       2  Patient will improve the ability to facilitate functional speech production skills for intelligible communication including compensatory strategies to promote quality of life and to maximize level of independence with communication  Plan:  -Continue with current plan of care  Working with multisyllabic words and sentences, LOUD approach   voice recording to increase insight/self monitoring, sentences-conversation   Verbal responses,

## 2022-03-03 NOTE — PROGRESS NOTES
Daily Note     Today's date: 3/3/2022  Patient name: Teri Whitaker  : 1965  MRN: [id number]  Referring provider: Asia Burleson MD  Dx:   Encounter Diagnosis     ICD-10-CM    1  Right sided weakness  R53 1    2  Balance disorder  R26 89    3  Decreased stamina  R53 83    4  Cerebrovascular accident (CVA), unspecified mechanism ([address] )  I63 9        Start Time: 1250  Stop Time: 1336  Total time in clinic (min): 46 minutes    Subjective: Pt arrives with PLS AFO  Felt good after speech and OT  Objective: See treatment diary below    Assessment:  Pt shows improved gait balance and increased gait speed with all challenges  Shows improved endurance on treadmill, with higher pace noting inadequate knee extension on R with more shortened step length, responds well to cues for longer step length  1 bout of instability when performing step up from compliant surface with fall toward R, solo step assist to recover  Tends to try to take bigger steps than able overground navigating obstacles, verbal cues for taking shorter steps when needed for obstacle navigation  Reports fatigue in RLE with 2lb aw present during high intensity gait training  Encouraged to continue stretching for L hip at home to help relieve pain in L hip during high intensity gait training  Patient demonstrated fatigue post treatment and would benefit from continued PT      Plan: Continue per plan of care  Mild L hip soreness post session  Continue high intensity gait training  Treadmill as able  Hip taping       Precautions: Depression, Falls  Re-eval Date: 3/18/2022    Date 2/21 2/23 2/28 3/3    Visit Count 11 12 13 14    FOTO    performed    Pain In        Pain Out                Testing        TUG 08 01 S PC       5xSTS 06 69 s       Gait speed 0 85 m/s SPC       Lin 33/56       2/6MWT 2mwt 320 ft RW       Manuals   LLE quad str, calf str, hip distraction AF      Neuro Re-Ed        Dynamic balance  Hurdles + 4' step up x8 laps solo step Static balance Solo step RLE focus 4x8 4' step reaching cones       Obstacle course        Gait in solo step with focus on increased stride        Color cone taps         High intensity gait Solo step 5 hurdles + 4 in step 4 laps x1, 6 laps x1 2lb ankle weight    CGA Stairs x4 2lb ankle weight  TM solo 1  6mph 2min  1  8mph 2min 10s  2 0mph 2min   TM solo 1  6mph 1min on 1  9mph 1 min on 3min total x1   x1 6min total RPE 7/10    2lb aw 6' step compliant mat 5 laps x3 cues for increased pace no AD RPE 5/10     // bars        Ther Ex        SLR x 4 SLR standing RLE x10 ext Side lying RLE x20 min PT assist - tapping cues With NMES x10     HR/TR        Sit to stand   3x15 cues for foot positioning     Step ups        Bridges    2x10    Abductor str    30" x3                    Ther Activity        ADL                Gait Training CGA SPC outside sidewalks, curbs, uneven surfaces 10min CGA SPC ortho side x1    CGA no AD ortho side x1 CGA SPC 130ft x2     Divided Attention        Head turns        Modalities         prn

## 2022-03-03 NOTE — PROGRESS NOTES
OCCUPATIONAL THERAPY INITIAL EVALUATION:     3/3/22  Mikhail Argue  1965  [id number]  Stephanie Haines MD CVA    Assessment/Plan    Skilled Analysis:  Pt is a 65 y o  male referred to Occupational Therapy s/p CVA  Pt participated in skilled OT evaluation and following formalized testing as well as clinical observation, Pt presents with the following areas of deficit: right side weakness, impaired motor control, impaired dynamic standing balance, all limiting ability to complete basic ADLs of dressing, as well as engagement in IADLs of lawn care and yardwork  Pt also limited in engagement in leisure activities  Pt will benefit from skilled Occupational Therapy services 2x/week for 8 weeks with focus on improving motor control, improving functional use of RUE and assist with return to driving  Short Term Goals (to be achieved within 4 weeks):  - Complete FMC testing  - Pt will be able to complete LBD of donning shoes by self, with min increase time and a/e as needed  - Pt will be able to don R MAFO independently  - Pt will demo G carryover of Home Exercise Program to improve functional progression towards goals in Plan of care and for improved functional use of RUE in all daily tasks    - Pt will demo with G tolerance to supine, seated, and in stance exercise x 45 minutes with minimal rest breaks required for increased engagement in weekly exercise regimen and increased engagement in life roles   - Pt will increase R UE strength to 5/5, through the use of strengthening exercises and home program for engagement in all daily and leisure activities          Long Term Goals (8 weeks):  1  Pt will improve RUE rate of manipulation for all FM tests for improved functional performance/independence with daily and leisure tasks x 25%   2  Complete assessments to determine eligibility to return to driving; identify any modifications needed for return to driving     3  Pt will be I with carryover of HEP to maintain strength and functional use of RUE      Subjective    SUBJECTIVE: "my body is worn out" "I can't use my right hand for things, like working on motors or things I liked to do"     PATIENT GOAL: to be able to put his pants, shoes and socks on by himself, to be able to mow his lawn again, to use his right hand to work on house projects/ [employer] on [email or web address], to return to driving  HISTORY OF PRESENT ILLNESS:     Pt is a 65 y o  male who was referred to Occupational Therapy following CVA  Pt admitted to the hospital in Dec 2021 with right side weakness and aphasia  MRI (+) acute stroke in right internal capsule and right pontine areas  Pt went to short term rehab prior to dc home with NATALY DUCKWORTH , who provides support with daily tasks  PMH:   Past Medical History:   Diagnosis Date    Allergic rhinitis     last assessed 5/15/12; resolved 9/17/14    Anxiety     Bell's palsy     last assessed 10/2/15; resolved 10/2/15    Depression     DJD (degenerative joint disease)     Fracture lumbar vertebra-closed ([facility] [address] )     last assessed 12/30/14; resolved 12/30/14    Gout     Hypertension     Knee osteoarthritis     Malignant renovascular hypertension          Pain Levels:  No reports of pain prior to and during eval      Objective    Impairments:     AROM:  LUE: WNL into all planes  RUE: WNL into all planes    MMT LUE:  Sh flex: 4/5  Sh Abd: 4+/5  Sh IR: 4+/5  Sh ER: 4+/5   Elbow flex: 5/5  Elbow Ext: 4+/5   Wrist Flex: 5/5  Wrist Ext: 5/5  Finger Flex: 5/5   Finger Ext: 5/5     MMT RUE:  Sh flex: 4-/5  Sh Abd: 4+/5  Sh IR: 4+/5   Sh ER: 4+/5   Elbow flex: 5/5  Elbow Ext: 4+/5  Wrist Flex: 5/5   Wrist Ext: 5/5  Finger Flex: 4+/5  Finger Ext: 4+/5    Sensation:   Light Touch: Intact  Hot/cold:  Intact     Functional cognition:   Grossly intact with attention, concentration, STM, LTM    Vision:   ROM: Intact  Tracking: Intact  Saccades: Intact   Visual Fields: Intact     ACTIVITIES OF DAILY LIVING:  Bathing: I  UBD: I  LBD: requires A to don B shoes and socks, A to thread RLE into pants, A to don R MAFO              OTHER PLANNED THERAPY INTERVENTIONS:   Supine, seated, and in stance neuro re-ed  FMC/prehension  Timed Trials  Manual tx  Hand to target  Sensory re-ed  Seated functional reach: crossing midline  Supine place and hold  WBearing strategies   Closed chain activities  Open chain activities      INTERVENTION COMMENTS:  Diagnosis: CVA  Precautions:  FOTO:  Insurance: Payor: Daphene Batten / Plan: MEDICARE A AND B / Product Type: Medicare A & B Fee for Service /   1st visits, PN due 3/30/22

## 2022-03-07 ENCOUNTER — APPOINTMENT (OUTPATIENT)
Dept: PHYSICAL THERAPY | Facility: CLINIC | Age: 57
End: 2022-03-07
Payer: MEDICARE

## 2022-03-07 ENCOUNTER — APPOINTMENT (OUTPATIENT)
Dept: SPEECH THERAPY | Facility: CLINIC | Age: 57
End: 2022-03-07
Payer: MEDICARE

## 2022-03-08 ENCOUNTER — OFFICE VISIT (OUTPATIENT)
Dept: OCCUPATIONAL THERAPY | Facility: CLINIC | Age: 57
End: 2022-03-08
Payer: MEDICARE

## 2022-03-08 ENCOUNTER — OFFICE VISIT (OUTPATIENT)
Dept: PHYSICAL THERAPY | Facility: CLINIC | Age: 57
End: 2022-03-08
Payer: MEDICARE

## 2022-03-08 ENCOUNTER — OFFICE VISIT (OUTPATIENT)
Dept: SPEECH THERAPY | Facility: CLINIC | Age: 57
End: 2022-03-08
Payer: MEDICARE

## 2022-03-08 DIAGNOSIS — I63.9 CEREBROVASCULAR ACCIDENT (CVA), UNSPECIFIED MECHANISM (HCC): ICD-10-CM

## 2022-03-08 DIAGNOSIS — R53.1 RIGHT SIDED WEAKNESS: Primary | ICD-10-CM

## 2022-03-08 DIAGNOSIS — R26.89 BALANCE DISORDER: ICD-10-CM

## 2022-03-08 DIAGNOSIS — R53.83 DECREASED STAMINA: ICD-10-CM

## 2022-03-08 DIAGNOSIS — G81.91 HEMIPLEGIA AFFECTING RIGHT DOMINANT SIDE, UNSPECIFIED ETIOLOGY, UNSPECIFIED HEMIPLEGIA TYPE (HCC): Primary | ICD-10-CM

## 2022-03-08 DIAGNOSIS — R47.1 DYSARTHRIA: Primary | ICD-10-CM

## 2022-03-08 PROCEDURE — 97112 NEUROMUSCULAR REEDUCATION: CPT

## 2022-03-08 PROCEDURE — 92507 TX SP LANG VOICE COMM INDIV: CPT

## 2022-03-08 PROCEDURE — 97530 THERAPEUTIC ACTIVITIES: CPT

## 2022-03-08 NOTE — PROGRESS NOTES
Daily Note     Today's date: 3/8/2022  Patient name: Tahira Badillo  : 1965  MRN: [id number]  Referring provider: Aime Malin MD  Dx:   Encounter Diagnosis     ICD-10-CM    1  Hemiplegia affecting right dominant side, unspecified etiology, unspecified hemiplegia type ([facility] )  G81 91        Start Time: 1130  Stop Time: 1215  Total time in clinic (min): 45 minutes    Subjective: "what do I need to do to drive again? Where can I get a driving test? I have things that I want to get done"       Objective:   3/8/22: Dynamometer: L hand: 74#; R hand: 70#   Pinch: L hand: 15, R hand: 14   Tripod: L hand: 19, R hand: 21   Lateral: L hand: 25, R hand: 24    9 hole Peg: L hand: 25 86 sec, R hand: 29 69 sec   Key hole pegboard: L hand: 1 min 26 sec, R hand: 1 min 41 sec     Completed FMC to simulate working on [employer]  Pt with G accuracy with R hand with manipulating small objects, min increased time      Assessment: Tolerated treatment well  Patient would benefit from continued OT      Plan: Continue per plan of care        Precautions: Depression, Falls  Re-eval Date: 3/30/2022    Date 3/3 3/8      Visit Count 1 2      FOTO done       Pain In 0 0      Pain Out 0 0

## 2022-03-08 NOTE — PROGRESS NOTES
Daily Note     Today's date: 3/8/2022  Patient name: Arnaldo Hurst  : 1965  MRN: [id number]  Referring provider: Richmond Zuniga MD  Dx:   Encounter Diagnosis     ICD-10-CM    1  Right sided weakness  R53 1    2  Balance disorder  R26 89    3  Decreased stamina  R53 83    4  Cerebrovascular accident (CVA), unspecified mechanism ([address] )  I63 9        Start Time: 1045  Stop Time: 1130  Total time in clinic (min): 45 minutes    Subjective: Pt arrives with PLS AFO, SPC  Reports going for walks over the weekend when it was nice  [place] like he may have over done it a little  Objective: See treatment diary below    Assessment:   Verbal cues for diagonal stepping pattern with focus on RLE hip strength  More weight bearing through RLE  Minimized exercise that further increased L hip pain  Shows fair stability with no UE assist on treadmill, only for short time d/t L hip  Hip taping continues to assist with pain modulation  Cues for increasing pacing during walking trials in solo step, shows good stability and RLE seems to fatigue quickly with backward walking  Encouraged to continue walking but avoid over-fatiguing as it may increase L hip pain and further inhibit his ability to get steps during later in the week  Patient demonstrated fatigue post treatment and would benefit from continued PT      Plan: Continue per plan of care  Mild L hip soreness post session  Continue high intensity gait training  No UE assist on treadmill  Hip taping  RLE weight bearing, hip strength        Precautions: Depression, Falls  Re-eval Date: 3/18/2022    Date 2/21 2/23 2/28 3/3 3/8   Visit Count 11 12 13 14 15   FOTO    performed    Pain In        Pain Out                Testing        TUG 08 01 S PC       5xSTS 06 69 s       Gait speed 0 85 m/s SPC       Lin 33/56       2/6MWT 2mwt 320 ft RW       Manuals   LLE quad str, calf str, hip distraction AF   L hip taping   Neuro Re-Ed        Dynamic balance  Hurdles + 4' step up x8 laps solo step   Solo step 4' step diagonals x10  6' x10   Static balance Solo step RLE focus 4x8 4' step reaching cones    Solo step RLE WB ball toss x20   Obstacle course        Gait in solo step with focus on increased stride        Color cone taps         High intensity gait Solo step 5 hurdles + 4 in step 4 laps x1, 6 laps x1 2lb ankle weight    CGA Stairs x4 2lb ankle weight  TM solo 1  6mph 2min  1  8mph 2min 10s  2 0mph 2min   TM solo 1  6mph 1min on 1  9mph 1 min on 3min total x1   x1 6min total RPE 7/10    2lb aw 6' step compliant mat 5 laps x3 cues for increased pace no AD RPE 5/10  TM 1  3mph 2min total  1  9mph 2min total    RTB resistance fwd no AD solo step 6 laps x1    RTB resis solo step bwd no AD 3 laps x2   // bars        Ther Ex        SLR x 4 SLR standing RLE x10 ext Side lying RLE x20 min PT assist - tapping cues With NMES x10     HR/TR        Sit to stand   3x15 cues for foot positioning     Step ups        Bridges    2x10    Abductor str    30" x3 2min total LLE                   Ther Activity        ADL                Gait Training CGA SPC outside sidewalks, curbs, uneven surfaces 10min CGA SPC ortho side x1    CGA no AD ortho side x1 CGA SPC 130ft x2     Divided Attention        Head turns        Modalities         prn

## 2022-03-08 NOTE — PROGRESS NOTES
Daily Speech Treatment Note    Today's date: 3/8/2022  Patients name: Bautista Kirkpatrick  : 1965  MRN: [id number]  Safety measures:   Referring provider: Sarwat Fu MD    Encounter Diagnosis     ICD-10-CM    1  Dysarthria  R47 1    2  Cerebrovascular accident (CVA), unspecified mechanism ([facility] [address] )  I63 9          Visit Tracking:  15  Reeval 3/14/22      Subjective/Behavioral:  - Patient pleasant/cooperative  Objective/Assessment:  Completed structured activities with patient to target goals below  Results as stated below  Short Term Goals:    1  Patient will utilize over-articulation & slow rate 80% of the time while orally reading @ sentences/paragraph length material to facilitate increased speech intelligibility with fading minimal cues  Practiced finishing automatic phrases, concrete fluency naming with using loudness: moderate cueing required  :  Average of 11 12 seconds in duration of /a/, 72 83 dB average loudness, High Pitch average: 279 83 Hz, low pitch: 95 Hz  Functional Phrases: 77 dB  Paragraph: 76 dB  Patient demonstrated some difficulty in paragraph reading but was able to tolerate reading overall  : /a/ 9 68 duration, 70 30     236 45 High Pitch, 112 44, Functional Phrases: 74 5 dB  70 dB sentences  : 70 dB /a/, high pitch 248, lower pitch 121 Hz  Patient at 80% accuracy with minimal cues, difficulty at paragraph level - deferring paragraph level at this time  Education on abdominal breathing to increase breath support and loudness, patient able to elicit this type of breathing while lying down and sitting up following instruction  : Loud /a/: 14 seconds duration, 70 dB , high pitch: 240 Hz, low pitch: 115 Hz   3/3: Loud /a/: 13 39, 69 dB,  155 64 Hz - 130 Hz Pitch range, 72 40 dB in functional phrases  Oral reading sentence level: using strategies 70% of the time    3/8: Loud /a/ exercises: Duration: 12 seconds average, 74 dB, High Pitch: 230 Hz, Low pitch average: 135 Hz, 78 dB functional phrases      2  Patient will complete structured oral reading and conversational tasks with the consistent use of compensatory strategies >80% of the time to facilitate increased speech intelligibility  2/16: Patient read 10+ word sentences  Patient utilized strategies 60% of the time  Demonstrated occassions of reading past a word or not sounding out a word for accurate articulation  Patient required review of multisyllabic words  When reading through the sentence a second time, the patient was able to read with near perfect intelligibility  2/21: Utilizing overarticulation with multisyllabic words: 95% of the time, patient has learned to slow down and take time to process the sounds, letters and articulation, improved reading at word level noted  Sentences: 80% accuracy  Paragraph level, close to 65% accuracy  2/22: Formulation of verbal responses given scenario- Minimal cues/modeling to overarticulate certain words to increase clarity  2/28: Conversation, answering personal questions: 90% acc  Personal discussion: Approximate use of strategies 80% of the time  3/3/22: Conversational tasks: Benefits from cues / reminders for increased loudness- improves to functional loudness level with verbal cue/occasional modeling  3/8: Conversation, 70 dB  With verbal cues for repetition, increase overarticulation and slow rate              Long Term Goals:     1  Patient will independently utilize speech intelligibility strategies (e g , over-exaggeration, slow rate, breath groups, etc ) during oral reading tasks >90% intelligibility to facilitate increased generalization of skills into conversational speech by discharge       2  Patient will improve the ability to facilitate functional speech production skills for intelligible communication including compensatory strategies to promote quality of life and to maximize level of independence with communication      Plan:  -Continue with current plan of care  Working with multisyllabic words and sentences, LOUD approach   voice recording to increase insight/self monitoring, sentences-conversation   Verbal responses,

## 2022-03-09 ENCOUNTER — APPOINTMENT (OUTPATIENT)
Dept: SPEECH THERAPY | Facility: CLINIC | Age: 57
End: 2022-03-09
Payer: MEDICARE

## 2022-03-09 ENCOUNTER — APPOINTMENT (OUTPATIENT)
Dept: PHYSICAL THERAPY | Facility: CLINIC | Age: 57
End: 2022-03-09
Payer: MEDICARE

## 2022-03-09 NOTE — PROGRESS NOTES
Daily Speech Treatment Note    Today's date: 3/10/2022  Patients name: Brissa Álvarez  : 1965  MRN: [id number]  Safety measures:   Referring provider: Tim Fields MD    Encounter Diagnosis     ICD-10-CM    1  Dysarthria  R47 1    2  Cerebrovascular accident (CVA), unspecified mechanism ([facility] [address] )  I63 9          Visit Trackin  Reeval 3/14/22      Subjective/Behavioral:  - Patient reports that he is "good"  Objective/Assessment:  Completed structured activities with patient to target goals below  Results as stated below  Short Term Goals:    1  Patient will utilize over-articulation & slow rate 80% of the time while orally reading @ sentences/paragraph length material to facilitate increased speech intelligibility with fading minimal cues  Practiced finishing automatic phrases, concrete fluency naming with using loudness: moderate cueing required  :  Average of 11 12 seconds in duration of /a/, 72 83 dB average loudness, High Pitch average: 279 83 Hz, low pitch: 95 Hz  Functional Phrases: 77 dB  Paragraph: 76 dB  Patient demonstrated some difficulty in paragraph reading but was able to tolerate reading overall  : /a/ 9 68 duration, 70 30     236 45 High Pitch, 112 44, Functional Phrases: 74 5 dB  70 dB sentences  : 70 dB /a/, high pitch 248, lower pitch 121 Hz  Patient at 80% accuracy with minimal cues, difficulty at paragraph level - deferring paragraph level at this time  Education on abdominal breathing to increase breath support and loudness, patient able to elicit this type of breathing while lying down and sitting up following instruction  : Loud /a/: 14 seconds duration, 70 dB , high pitch: 240 Hz, low pitch: 115 Hz   3/3: Loud /a/: 13 39, 69 dB,  155 64 Hz - 130 Hz Pitch range, 72 40 dB in functional phrases  Oral reading sentence level: using strategies 70% of the time    3/8: Loud /a/ exercises: Duration: 12 seconds average, 74 dB, High Pitch: 230 Hz, Low pitch average: 135 Hz, 78 dB functional phrases  3/10: Patient read a short story from [employer] (large print) to implement compensatory strategies (e g  slow rate and over articulation) to improve intelligibility when orally reading  The patient demonstrated 95% intelligibility during the task - requiring repetition on a small amount of multisyllabic words        2  Patient will complete structured oral reading and conversational tasks with the consistent use of compensatory strategies >80% of the time to facilitate increased speech intelligibility  2/16: Patient read 10+ word sentences  Patient utilized strategies 60% of the time  Demonstrated occassions of reading past a word or not sounding out a word for accurate articulation  Patient required review of multisyllabic words  When reading through the sentence a second time, the patient was able to read with near perfect intelligibility  2/21: Utilizing overarticulation with multisyllabic words: 95% of the time, patient has learned to slow down and take time to process the sounds, letters and articulation, improved reading at word level noted  Sentences: 80% accuracy  Paragraph level, close to 65% accuracy  2/22: Formulation of verbal responses given scenario- Minimal cues/modeling to overarticulate certain words to increase clarity  2/28: Conversation, answering personal questions: 90% acc  Personal discussion: Approximate use of strategies 80% of the time  3/3/22: Conversational tasks: Benefits from cues / reminders for increased loudness- improves to functional loudness level with verbal cue/occasional modeling  3/8: Conversation, 70 dB  With verbal cues for repetition, increase overarticulation and slow rate  3/10: The patient was prompted to share his opinion on various topics (e g  raising children) and list steps for various activities  The patient demonstrated 80% intelligibility during the task, utilizing strategies of slow rate and over-articulation  Patient was reminded to start off slow when beginning to speak  Additionally he required moderate verbal cues to over articulate  Long Term Goals:     1  Patient will independently utilize speech intelligibility strategies (e g , over-exaggeration, slow rate, breath groups, etc ) during oral reading tasks >90% intelligibility to facilitate increased generalization of skills into conversational speech by discharge       2  Patient will improve the ability to facilitate functional speech production skills for intelligible communication including compensatory strategies to promote quality of life and to maximize level of independence with communication  Plan:  -Continue with current plan of care  Working with multisyllabic words and sentences, LOUD approach   voice recording to increase insight/self monitoring, sentences-conversation   Verbal responses,

## 2022-03-10 ENCOUNTER — OFFICE VISIT (OUTPATIENT)
Dept: SPEECH THERAPY | Facility: CLINIC | Age: 57
End: 2022-03-10
Payer: MEDICARE

## 2022-03-10 ENCOUNTER — OFFICE VISIT (OUTPATIENT)
Dept: PHYSICAL THERAPY | Facility: CLINIC | Age: 57
End: 2022-03-10
Payer: MEDICARE

## 2022-03-10 ENCOUNTER — OFFICE VISIT (OUTPATIENT)
Dept: OCCUPATIONAL THERAPY | Facility: CLINIC | Age: 57
End: 2022-03-10
Payer: MEDICARE

## 2022-03-10 DIAGNOSIS — R47.1 DYSARTHRIA: Primary | ICD-10-CM

## 2022-03-10 DIAGNOSIS — R26.89 BALANCE DISORDER: ICD-10-CM

## 2022-03-10 DIAGNOSIS — I63.9 CEREBROVASCULAR ACCIDENT (CVA), UNSPECIFIED MECHANISM (HCC): ICD-10-CM

## 2022-03-10 DIAGNOSIS — G81.91 HEMIPLEGIA AFFECTING RIGHT DOMINANT SIDE, UNSPECIFIED ETIOLOGY, UNSPECIFIED HEMIPLEGIA TYPE (HCC): Primary | ICD-10-CM

## 2022-03-10 DIAGNOSIS — R53.83 DECREASED STAMINA: ICD-10-CM

## 2022-03-10 DIAGNOSIS — R53.1 RIGHT SIDED WEAKNESS: Primary | ICD-10-CM

## 2022-03-10 PROCEDURE — 97112 NEUROMUSCULAR REEDUCATION: CPT

## 2022-03-10 PROCEDURE — 97116 GAIT TRAINING THERAPY: CPT

## 2022-03-10 PROCEDURE — 97130 THER IVNTJ EA ADDL 15 MIN: CPT

## 2022-03-10 PROCEDURE — 92507 TX SP LANG VOICE COMM INDIV: CPT

## 2022-03-10 PROCEDURE — 97129 THER IVNTJ 1ST 15 MIN: CPT

## 2022-03-10 NOTE — PROGRESS NOTES
Daily Note     Today's date: 3/10/2022  Patient name: Tahira Badillo  : 1965  MRN: [id number]  Referring provider: Aime Malin MD  Dx:   Encounter Diagnosis     ICD-10-CM    1  Right sided weakness  R53 1    2  Balance disorder  R26 89    3  Decreased stamina  R53 83    4  Cerebrovascular accident (CVA), unspecified mechanism ([address] )  I63 9                   Subjective: Pt notes that his L hip feels good today  His L knee however bothered him when he woke up  Feels like his L calf is tight  AFO donned  Objective: See treatment diary below    Assessment:   Focus on R hip/knee strength and gait  Improved coordination and rhythm with diagonal step ups  Trials of theraband around R knee to provide resistance + cueing for R knee drive to assist with foot clearance + adequate knee extension in terminal swing/initial contact  With continued cueing shows good hip/knee flexion through swing with theraband resistance  Demonstrates slightly more instability with no AD gait outside on uneven surfaces  With RLE WB focus tends to fatigue quickly  Instructed to continue L abductor stretch and calf stretch at home  Patient demonstrated fatigue post treatment and would benefit from continued PT      Plan: Continue per plan of care  Mild L knee soreness post session  Continue high intensity gait training as able  R hip strength  Treadmill trials as able       Precautions: Depression, Falls  Re-eval Date: 3/18/2022    Date 3/10       Visit Count 16       FOTO        Pain In        Pain Out                Testing        TUG 08 01 S PC       5xSTS 06 69 s       Gait speed 0 85 m/s SPC       Lin 33/56       2/6MWT 2mwt 320 ft RW       Manuals         Neuro Re-Ed        Dynamic balance CGA 6' step diagonals 2x10    Bwd walking solo step 6 laps x1       Static balance RLE WB ball toss x20 solo step       Obstacle course        Gait in solo step with focus on increased stride        Color cone taps         High intensity gait Bwd RTB resistance 6 laps x1    Fwd RTB at R knee 6 laps x2       // bars        Ther Ex        SLR x 4        HR/TR        Sit to stand        Step ups        Bridges        Abductor str                        Ther Activity        ADL                Gait Training No AD outside, uneven surfaces, curbs, ramps 10min       Divided Attention        Head turns        Modalities         prn

## 2022-03-10 NOTE — PROGRESS NOTES
Daily Note     Today's date: 3/10/2022  Patient name: Bautista Kirkpatrick  : 1965  MRN: [id number]  Referring provider: Sarwat Fu MD  Dx:   Encounter Diagnosis     ICD-10-CM    1  Hemiplegia affecting right dominant side, unspecified etiology, unspecified hemiplegia type ([facility] [address] )  G81 91        Start Time: 1015  Stop Time: 1100  Total time in clinic (min): 45 minutes    Subjective: pt without c/o      Objective:   3/10/22: Pre-driving assessments:   Trails A: 31 sec, no errors    Trails B: 3 min 12 sec, multiple errors, did not fully complete   [place]: 4 min 44 sec, no deficits noted    MOCA: 18/30    3/8/22: Dynamometer: L hand: 74#; R hand: 70#   Pinch: L hand: 15, R hand: 14   Tripod: L hand: 19, R hand: 21   Lateral: L hand: 25, R hand: 24    9 hole Peg: L hand: 25 86 sec, R hand: 29 69 sec   Key hole pegboard: L hand: 1 min 26 sec, R hand: 1 min 41 sec     Completed FMC to simulate working on [employer]  Pt with G accuracy with R hand with manipulating small objects, min increased time      Assessment: Tolerated treatment well  Patient would benefit from continued OT      Plan: Continue per plan of care        Precautions: Depression, Falls  Re-eval Date: 3/30/2022    Date 3/3 3/8 3/10     Visit Count 1 2 3     FOTO done       Pain In 0 0 0     Pain Out 0 0 0

## 2022-03-14 ENCOUNTER — PATIENT OUTREACH (OUTPATIENT)
Dept: FAMILY MEDICINE CLINIC | Facility: HOSPITAL | Age: 57
End: 2022-03-14

## 2022-03-14 ENCOUNTER — APPOINTMENT (OUTPATIENT)
Dept: PHYSICAL THERAPY | Facility: CLINIC | Age: 57
End: 2022-03-14
Payer: MEDICARE

## 2022-03-14 ENCOUNTER — APPOINTMENT (OUTPATIENT)
Dept: SPEECH THERAPY | Facility: CLINIC | Age: 57
End: 2022-03-14
Payer: MEDICARE

## 2022-03-15 ENCOUNTER — EVALUATION (OUTPATIENT)
Dept: SPEECH THERAPY | Facility: CLINIC | Age: 57
End: 2022-03-15
Payer: MEDICARE

## 2022-03-15 ENCOUNTER — OFFICE VISIT (OUTPATIENT)
Dept: OCCUPATIONAL THERAPY | Facility: CLINIC | Age: 57
End: 2022-03-15
Payer: MEDICARE

## 2022-03-15 ENCOUNTER — OFFICE VISIT (OUTPATIENT)
Dept: PHYSICAL THERAPY | Facility: CLINIC | Age: 57
End: 2022-03-15
Payer: MEDICARE

## 2022-03-15 DIAGNOSIS — R53.83 DECREASED STAMINA: ICD-10-CM

## 2022-03-15 DIAGNOSIS — R47.1 DYSARTHRIA: Primary | ICD-10-CM

## 2022-03-15 DIAGNOSIS — I63.9 CEREBROVASCULAR ACCIDENT (CVA), UNSPECIFIED MECHANISM (HCC): ICD-10-CM

## 2022-03-15 DIAGNOSIS — G81.91 HEMIPLEGIA AFFECTING RIGHT DOMINANT SIDE, UNSPECIFIED ETIOLOGY, UNSPECIFIED HEMIPLEGIA TYPE (HCC): Primary | ICD-10-CM

## 2022-03-15 DIAGNOSIS — R26.89 BALANCE DISORDER: ICD-10-CM

## 2022-03-15 DIAGNOSIS — R53.1 RIGHT SIDED WEAKNESS: Primary | ICD-10-CM

## 2022-03-15 PROCEDURE — 97110 THERAPEUTIC EXERCISES: CPT

## 2022-03-15 PROCEDURE — 92507 TX SP LANG VOICE COMM INDIV: CPT

## 2022-03-15 PROCEDURE — 97112 NEUROMUSCULAR REEDUCATION: CPT

## 2022-03-15 PROCEDURE — 97530 THERAPEUTIC ACTIVITIES: CPT

## 2022-03-15 NOTE — PROGRESS NOTES
Daily Note     Today's date: 3/15/2022  Patient name: Gretta Lin  : 1965  MRN: [id number]  Referring provider: Rosemarie Guidry MD  Dx:   Encounter Diagnosis     ICD-10-CM    1  Right sided weakness  R53 1    2  Balance disorder  R26 89    3  Decreased stamina  R53 83    4  Cerebrovascular accident (CVA), unspecified mechanism ([address] )  I63 9        Start Time: 1048  Stop Time: 1130  Total time in clinic (min): 42 minutes    Subjective: Pt notes that his L knee and hip are bothering him  Not feeling himself today  Objective: See treatment diary below    Assessment:   Less focus on walking during session due to L hip and knee pain flare up  CGA throughout session for safety  Occasional toe catch, verbal cues for R knee drive  Shows instability with R sided lateral stepping more so than L sided d/t difficulty with R abduction  Continues to demonstrate R trendelenburg gait pattern  Cues for slow eccentric on R side with step ups  Instructed on activity modification as L sided compensation may be driving his a portion of his pain  Instructed to bring AFO next session  Patient demonstrated fatigue post treatment and would benefit from continued PT      Plan: Continue per plan of care  Min increase in L hip/knee pain  Re-assessment nv       Precautions: Depression, Falls  Re-eval Date: 3/18/2022    Date 3/10 3/15      Visit Count 16 17      FOTO        Pain In        Pain Out                Testing        TUG 08 01 S PC       5xSTS 06 69 s       Gait speed 0 85 m/s SPC       Lin 33/56       2/6MWT 2mwt 320 ft RW       Manuals         Neuro Re-Ed        Dynamic balance CGA 6' step diagonals 2x10    Bwd walking solo step 6 laps x1 Lateral stepping 4 laps 20ft    Bwd 20ft x2      Static balance RLE WB ball toss x20 solo step Foam wedge balance 20' x3      Obstacle course        Gait in solo step with focus on increased stride        Color cone taps         High intensity gait Bwd RTB resistance 6 laps x1    Fwd RTB at R knee 6 laps x2 Stairs x3 laps 1 UE assist no rest      // bars        Ther Ex        SLR x 4        HR/TR        Sit to stand        Step ups  Lat 6' x10 2 UE assist      Bridges        Abductor str  61' x2      Leg press  155lb [id number]              Ther Activity        ADL                Gait Training No AD outside, uneven surfaces, curbs, ramps 10min       Divided Attention        Head turns        Modalities         prn

## 2022-03-15 NOTE — PROGRESS NOTES
Speech-Language Pathology Re-Evaluation    Today's date: 3/15/2022  Patients name: Shamar Damico  : 1965  MRN: [id number]  Safety measures:   Referring provider: Brittney Hull MD    Encounter Diagnosis     ICD-10-CM    1  Dysarthria  R47 1    2  Cerebrovascular accident (CVA), unspecified mechanism ([address] )  I63 9          Visit Trackin (update from last note)    Subjective comments: "Good "    Patient's goal(s): Patient wishes to continue to improve his speech  Assessments    Motor Speech Evaluation:        3/15/22    1/17/22  -Facial appearance Symmetrical Right facial droop   -Mandible function Adequate ROM Adequate ROM   -Dentition Adequate Adequate   -Labial function Adequate Decreased ROM (right side) and Decreased strength (right side)   -Lingual function Adequate Decreased coordination   -Velar function Adequate Unable to visualize   -Oral Apraxia? Absent Absent   -Vocal Quality Clear/adequate Clear/adequate   -Volitional Cough Adequate Strong/productive   -Respiration Normal Shallow   -Drooling? No No   -Tremor/Involuntary Movement? Not present Not present   -Tracheostomy Present? No No          1  Sustained phonation      -Vowel prolongation (norm=15-20 sec) 16 21 15 sec           2  Diadochokinetic (DDK) Rates      -puh-puh-puh (norm=3 0-5 5 rep/1 sec) WFL WFL   -tuh-tuh-tuh (norm=25 rep/10 sec) WFL Fast   -kuh-kuh-kuh (norm=25 rep/10 sec) Irregular, inarticulate, 20  Inadequate velar contact Irregular, fast, discoordinated   -puh-tuh-kuh (norm=8 rep/10 sec) Functional in rate but inarticulate speech Fast, not as articulate     Improved focus noted Impulsivity, distractibility noted   3   Articulation      -Single syllable words 80% intelligible 63% intelligible   -Multisyllabic words 50% intelligible 20% intelligible   -Repetition of multisyllabic words 5x 66% intelligible, difficulty noted with catastrophe 0% intelligible   -Words of progressive length 100% intelligible   60% intelligible   -Sentences 75% intelligible, increased to 100% with self correction, increased time for processing & to utilize strategies  40% intelligible   -Reading ([employer]) 75% intelligible, increased processing time to articulate 50% intelligible   -Conversation 70% intelligible 25-30% intelligible          4  Counting      -1 to 20 Very fast with no intake of extra breaths Very fast with no intake of extra breaths   -20 to 1 DNT DNT      Patient presents with moderate dysarthria characterized by Imprecise articulation, Decreased breath support for speech and Fast rate           Goals     1  Patient will utilize over-articulation & slow rate 80% of the time while orally reading @ sentences/paragraph length material to facilitate increased speech intelligibility with fading minimal cues  ACHIEVED   NEW GOAL LISTED BELOW  2  Patient will complete structured oral reading and conversational tasks with the consistent use of compensatory strategies >80% of the time to facilitate increased speech intelligibility  PATIENT BENEFITS FROM MODERATE VERBAL CUES TO INCREASE LOUDNESS AND OVERARTICULATE  NOT ACHIEVED  NEW GOAL LISTED BELOW      New Short Term Goals:    1  Patient will utilize over-articulation & slow rate 80% of the time while orally reading multisyllabic words - 9-10 word length sentences to facilitate increased speech intelligibility with independence  2   Patient will complete structured closed ended and open ended conversational tasks with the consistent use of compensatory strategies >80% of the time with fading minimal cues to facilitate increased speech intelligibility          Long Term Goals:     1  Patient will independently utilize speech intelligibility strategies (e g , over-exaggeration, slow rate, breath groups, etc ) during oral reading tasks >90% intelligibility to facilitate increased generalization of skills into conversational speech by discharge  CONTINUE     2  Patient will improve the ability to facilitate functional speech production skills for intelligible communication including compensatory strategies to promote quality of life and to maximize level of independence with communication  CONTINUE    Impressions/Recommendations        Impressions:   -Patient has improved from severe dysarthria (initially during 1/17/2022 evaluation) to currently moderate severity level dysarthria characterized by fast rate, decreased intensity, misarticulation & decreased breath coordination following CVA x 1 in December 2021  Patient has decreased intelligibility (approximately 70% in conversation improved from 30% initially)  Patient motivated and still wishes to continue to improve his speech  Recommend continued motor speech therapy focused on practice and independent utilization of compensatory strategies and functional utilization from word- conversational level to maximize clarity of communication in expression of basic-semi complex needs, wants & ideas        Recommendations:  -Patient would benefit from outpatient skilled Speech Therapy services: Speech-language therapy    -Frequency: 1-2x weekly  -Duration: 6-8 weeks    -Intervention certification from: 6/91/9455  -Intervention certification to: 3/69/0416    -Intervention comments:   Loudness exercises, focus on articulation, taking time to process expressive output in conversational tasks

## 2022-03-15 NOTE — PROGRESS NOTES
Daily Note     Today's date: 3/15/2022  Patient name: Magdaleno Crandall  : 1965  MRN: [id number]  Referring provider: John Cotton MD  Dx:   Encounter Diagnosis     ICD-10-CM    1  Hemiplegia affecting right dominant side, unspecified etiology, unspecified hemiplegia type ([facility] [address] )  G81 91        Start Time: 0945  Stop Time: 1030  Total time in clinic (min): 45 minutes    Subjective: "I am a little wobbly after that"       Objective:   3/15: pt participated in skilled OT with focus on improving reaction time, attention/ concentration during task, and UB strengthening so as to increase pt's participation in ADLs, IADLs, and leisure activities  Pt engaged in therex utilizing orange and purple theraband focusing on proximal strength/endurance and core stability to improve motor control and quality of movement for fxl reach activities to increase indep engaging in IADLs and lesiure activities  Pt completed chest press, reverse chest flys, biceps/ triceps 10 reps x 2 sets  Pt completed squat chops for core stability and anti-rotation band exercises for core stability, 12 reps x 2  Pt with F to G ricky, required rest breaks, no reports of pain  Pt noted to be more off balance after completion of core stability exercises  Pt engaged in pre-driving reaction time activity to prepare for return to driving  Pt completed seated and was required to quickly stop moving target with RLE at random and in reaction to specific target  Pt with 85% accuracy for reaction time during random stop and 60% accurate reaction to specified target  Assessment: Tolerated treatment well  Pt able to complete UB ther ex with min difficulty with motor control of RUE  Pt required min cues to focus on task at hand, noted increased distraction during session today  Patient would benefit from continued OT      Plan: Continue per plan of care        Precautions: Depression, Falls  Re-eval Date: 3/30/2022    Date 3/3 3/8 3/10 3/15 Visit Count 1 2 3 4    FOTO done       Pain In 0 0 0 0    Pain Out 0 0 0 0

## 2022-03-16 ENCOUNTER — APPOINTMENT (OUTPATIENT)
Dept: SPEECH THERAPY | Facility: CLINIC | Age: 57
End: 2022-03-16
Payer: MEDICARE

## 2022-03-16 ENCOUNTER — APPOINTMENT (OUTPATIENT)
Dept: PHYSICAL THERAPY | Facility: CLINIC | Age: 57
End: 2022-03-16
Payer: MEDICARE

## 2022-03-17 ENCOUNTER — OFFICE VISIT (OUTPATIENT)
Dept: PHYSICAL THERAPY | Facility: CLINIC | Age: 57
End: 2022-03-17
Payer: MEDICARE

## 2022-03-17 ENCOUNTER — OFFICE VISIT (OUTPATIENT)
Dept: OCCUPATIONAL THERAPY | Facility: CLINIC | Age: 57
End: 2022-03-17
Payer: MEDICARE

## 2022-03-17 ENCOUNTER — OFFICE VISIT (OUTPATIENT)
Dept: SPEECH THERAPY | Facility: CLINIC | Age: 57
End: 2022-03-17
Payer: MEDICARE

## 2022-03-17 DIAGNOSIS — G81.91 HEMIPLEGIA AFFECTING RIGHT DOMINANT SIDE, UNSPECIFIED ETIOLOGY, UNSPECIFIED HEMIPLEGIA TYPE (HCC): Primary | ICD-10-CM

## 2022-03-17 DIAGNOSIS — R53.1 RIGHT SIDED WEAKNESS: Primary | ICD-10-CM

## 2022-03-17 DIAGNOSIS — R47.1 DYSARTHRIA: Primary | ICD-10-CM

## 2022-03-17 DIAGNOSIS — I63.9 CEREBROVASCULAR ACCIDENT (CVA), UNSPECIFIED MECHANISM (HCC): ICD-10-CM

## 2022-03-17 DIAGNOSIS — R26.89 BALANCE DISORDER: ICD-10-CM

## 2022-03-17 DIAGNOSIS — R53.83 DECREASED STAMINA: ICD-10-CM

## 2022-03-17 PROCEDURE — 97110 THERAPEUTIC EXERCISES: CPT

## 2022-03-17 PROCEDURE — 97112 NEUROMUSCULAR REEDUCATION: CPT

## 2022-03-17 PROCEDURE — 92507 TX SP LANG VOICE COMM INDIV: CPT

## 2022-03-17 NOTE — PROGRESS NOTES
Daily Speech Treatment Note    Today's date: 3/17/2022  Patients name: Radha Mayes  : 1965  MRN: [id number]  Safety measures:   Referring provider: Gloria Amin MD    Encounter Diagnosis     ICD-10-CM    1  Dysarthria  R47 1    2  Cerebrovascular accident (CVA), unspecified mechanism ([facility] [address] )  I63 9          Visit Trackin (update from last note)    Subjective/Behavioral:  - Pleasant/Cooperative        Objective/Assessment:  Completed structured activities with patient to target goals below  Results as stated below  Short-term goals:  1  Patient will utilize over-articulation, loudness & slow rate 80% of the time while orally reading multisyllabic words - 9-10 word length sentences to facilitate increased speech intelligibility with independence  3/17: 80% Utilizing strategies in reading multisyllabic words, sentences  Read script at [facility] with overall 80% use of strategies to increase clarity  Vocal adduction exercises completed with overall 12-13 seconds duration with 67-73 Db loudness        2   Patient will complete structured closed ended and open ended conversational tasks with the consistent use of compensatory strategies >80% of the time with fading minimal cues to facilitate increased speech intelligibility  3/17:  Sequencing cooking items (Vitaliy Bradley), min cues to promote overarticulation and loudness            Long Term Goals:     1  Patient will independently utilize speech intelligibility strategies (e g , over-exaggeration, slow rate, breath groups, etc ) during oral reading tasks >90% intelligibility to facilitate increased generalization of skills into conversational speech by discharge       2  Patient will improve the ability to facilitate functional speech production skills for intelligible communication including compensatory strategies to promote quality of life and to maximize level of independence with communication       Plan:  -Continue with current plan of care    Loudness exercises, focus on articulation, taking time to process expressive output in conversational tasks

## 2022-03-17 NOTE — PROGRESS NOTES
Daily Note     Today's date: 3/17/2022  Patient name: Jean Paul Chakraborty  : 1965  MRN: [id number]  Referring provider: Jann Ryan MD  Dx:   Encounter Diagnosis     ICD-10-CM    1  Hemiplegia affecting right dominant side, unspecified etiology, unspecified hemiplegia type ([facility] [address] )  G81 91        Start Time: 1015  Stop Time: 1100  Total time in clinic (min): 45 minutes    Subjective: pt without complaints       Objective:    3/17: Focus of session on establishing an HEP for core stability and UB strengthening  Pt engaged in therex utilizing green theraband focusing on proximal strength/endurance and core stability to improve motor control and quality of movement for fxl reach activities to increase indep engaging in IADLs and lesiure activities  See daily grid below for details  Pt with overall depressed demeanor  Discussed pt's engagement in activities at home  Pt with difficulty stating activities that make him happy  After discussion, pt able to state 3 things that he enjoys: walking, working on motors and activities that he is physically active  Will further explore leisure activity options to further engage pt when at home to increase carryover and improve motor movt, as well as to improve depression  DATE 3/17               Neuro Re-Ed                                        Ther Ex        Deadbug 10 x 2       Supermans 10 x 2       Bird Dog in Quadriped 10        Modified V-ups 10 x 2        Theraband  chest press 10 x 2        Theraband horizontal abduction  10 x 2        Theraband Rows 10 x 2        Band Sh ER        Band Sh IR         Band Sh flex 10 x 2        Band Bicep curls  10 x 2        Theraband Tricep extension 10 x 2        Squat Chops         Anti-rotation core with band         Ther Activity                                Cognition                             Assessment: Tolerated treatment well  Pt able to complete UB ther ex with min difficulty with motor control of RUE    Patient would benefit from continued OT      Plan: Continue per plan of care        Precautions: Depression, Falls  Re-eval Date: 3/30/2022    Date 3/3 3/8 3/10 3/15 3/17   Visit Count 1 2 3 4 5   FOTO done       Pain In 0 0 0 0 5   Pain Out 0 0 0 0 5

## 2022-03-17 NOTE — PROGRESS NOTES
PT Re-Evaluation     Today's date: 3/17/2022  Patient name: Familia Roberson  : 1965  MRN: [id number]  Referring provider: Vandana Jaeger MD  Dx:   Encounter Diagnosis     ICD-10-CM    1  Right sided weakness  R53 1    2  Balance disorder  R26 89    3  Decreased stamina  R53 83    4  Cerebrovascular accident (CVA), unspecified mechanism ([address] )  I63 9        Start Time: 0915  Stop Time: 1000  Total time in clinic (min): 45 minutes    Assessment  Assessment details: Patient is a 65y o  year old male presenting to OPPT s/p diagnosis of CVA  Pt has continued to make steady progress in regards to his walking, LE strength, and balance  He is most hindered at this time by his L hip and knee pain  Despite this he has continued to show gains in his endurance and balance  His gait speed is still classified as an unlimited community ambulator with the use of his device  Still shows difficulty with his foot clearance with the use of his AFO  Over the next month, will continue to focus on walking stability in more challenging situations to maximize function and safety  Pt would benefit from another month of skilled outpatient therapy to address his goals  Impairments: abnormal gait, activity intolerance, impaired balance, impaired physical strength, lacks appropriate home exercise program and safety issue  Understanding of Dx/Px/POC: good   Prognosis: good    Goals  In 4 weeks, patient will:  1  Pt will achieve MDC value, 2 9s, on TUG using least restrictive device to demonstrate reduced fall risk - met  2  Improve 5xSTS by at least 2 seconds to demonstrate improved lower extremity strength and support - met  3  Demonstrate increased strength of R lower extremity by at least 1/2 grade to allow for improved transfer quality and stability - not met  4  Demonstrate ability to perform 20 minutes of activity without requiring seated rest break  - met    In 4-10 weeks, patient will:  1    Meet gait speed of 0 70 m/s with least restrictive device to meet MDC value and reduce risk of falls  - met  2  Improve Lin balance score by at least 5 points to meet MDC value and show improved balance with ADLs  3   Report independence with walking program and consistent carryover with HEP  - met  4  Improve 2MWT by at least 40ft with least restrictive device to show improved endurance to complete ADLs  - met  5  Improve FOTO score to predicted value to demonstrate improved functional mobility  - met      In 4 weeks  1  Improve gait speed by at least 0 08 m/s to improve safety crossing a crosswalk  2   Improve Lin balance score to above cutoff of 45 to show improved balance and reduced fall risk  3   Complete 6MWT to show improved walking endurance  4   Complete FGA and improve score by at least 3 points to show improved dynamic balance and reduce fall risk    Plan  Patient would benefit from: skilled physical therapy  Other planned modality interventions: as needed  Planned therapy interventions: neuromuscular re-education, manual therapy, patient education, home exercise program, therapeutic exercise, therapeutic activities and gait training  Frequency: 2x week  Duration in weeks: 4  Plan of Care beginning date: 3/17/2022  Plan of Care expiration date: 4/16/2022  Treatment plan discussed with: patient and family        Subjective Evaluation    History of Present Illness  Mechanism of injury: Update 2/16: Pt denies falls  Overall feels like things are moving in the right direction  Has received new AFO from   Wears the brace at home a lot  His back has been bothering him more recently  Present at PT: Pt reports to oupt with his significant other eulalia  Reports that he was at inpatient rehab for 2 weeks  Had all disciplines there as well as with home health  Was dc'd last Friday  Using a RW to mobilize around the house  Notes his depression has been getting in the way of being more active   Feels like he just wants to lay around a lot  Did get afo while in rehab (on dc day)  Has not been wearing it as it is uncomfortable  Using shower chair  Reacher to put shoes on  Has difficulty balancing to get dressed  Needs to sit frequently while doing ADLs  Pain  No pain reported    Social Support  Steps to enter house: yes  3  Stairs in house: yes   12  Lives with: spouse    Employment status: not working (disability)  Treatments  Previous treatment: physical therapy, speech therapy and occupational therapy  Current treatment: speech therapy  Discharged from (in last 30 days): home health care  Patient Goals  Patient goals for therapy: increased strength, improved balance and independence with ADLs/IADLs  Patient goal: "Maybe walk without the walker"        Objective     Strength/Myotome Testing     Left Hip   Planes of Motion   Flexion: 4+  Extension: 4+  Abduction: 4+    Right Hip   Planes of Motion   Flexion: 4-  Extension: 4-  Abduction: 4-    Left Knee   Extension: 4+    Right Knee   Extension: 4-    Left Ankle/Foot   Dorsiflexion: 4+  Plantar flexion: 4+    Right Ankle/Foot   Dorsiflexion: 3  Plantar flexion: 3  Neuro Exam:     Functional outcomes   Gait speed: 0 85 m/s  5x sit to stand: 06 69 more L weight shift (seconds)  2 minute walk test: 320 ft w SPC  TU 01 SPC, AFO (seconds)  Functional outcome comment: See Chandni Malik in flowsheet  Functional outcome gait comment: Pt ambulates with RW  R hip circumduction and R trendelenburg  Wearing PLS AFO, pt reports use at home  Noted a few instances of issue with toe clearance               Precautions: Depression, Falls  Re-eval Date: 3/18/2022    Date 3/10 3/15 3/17     Visit Count 16 17 18     FOTO        Pain In        Pain Out                Testing        TUG 08 01 S PC       5xSTS 06 69 s       Gait speed 0 85 m/s SPC       Lin 33/56       2/6MWT 2mwt 320 ft RW       Manuals         Neuro Re-Ed        Dynamic balance CGA 6' step diagonals 2x10    Bwd walking solo step 6 laps x1 Lateral stepping 4 laps 20ft    Bwd 20ft x2 Marching no UE 2x10 RLE    1x10 BLE     Static balance RLE WB ball toss x20 solo step Foam wedge balance 20' x3 Lin     Obstacle course        Gait in solo step with focus on increased stride        Color cone taps         High intensity gait Bwd RTB resistance 6 laps x1    Fwd RTB at R knee 6 laps x2 Stairs x3 laps 1 UE assist no rest      // bars        Ther Ex   2MWT     SLR x 4        HR/TR        Sit to stand        Step ups  Lat 6' x10 2 UE assist Lat 8' 2x10  Fwd 8' 2x10     Bridges        Abductor str  61' x2 60" x2     Leg press  155lb [id number]              Ther Activity        ADL                Gait Training No AD outside, uneven surfaces, curbs, ramps 10min       Divided Attention        Head turns        Modalities         prn

## 2022-03-21 ENCOUNTER — OFFICE VISIT (OUTPATIENT)
Dept: SPEECH THERAPY | Facility: CLINIC | Age: 57
End: 2022-03-21
Payer: MEDICARE

## 2022-03-21 ENCOUNTER — OFFICE VISIT (OUTPATIENT)
Dept: OCCUPATIONAL THERAPY | Facility: CLINIC | Age: 57
End: 2022-03-21
Payer: MEDICARE

## 2022-03-21 ENCOUNTER — APPOINTMENT (OUTPATIENT)
Dept: PHYSICAL THERAPY | Facility: CLINIC | Age: 57
End: 2022-03-21
Payer: MEDICARE

## 2022-03-21 ENCOUNTER — OFFICE VISIT (OUTPATIENT)
Dept: PHYSICAL THERAPY | Facility: CLINIC | Age: 57
End: 2022-03-21
Payer: MEDICARE

## 2022-03-21 ENCOUNTER — APPOINTMENT (OUTPATIENT)
Dept: SPEECH THERAPY | Facility: CLINIC | Age: 57
End: 2022-03-21
Payer: MEDICARE

## 2022-03-21 DIAGNOSIS — G81.91 HEMIPLEGIA AFFECTING RIGHT DOMINANT SIDE, UNSPECIFIED ETIOLOGY, UNSPECIFIED HEMIPLEGIA TYPE (HCC): Primary | ICD-10-CM

## 2022-03-21 DIAGNOSIS — R47.1 DYSARTHRIA: Primary | ICD-10-CM

## 2022-03-21 DIAGNOSIS — R53.83 DECREASED STAMINA: ICD-10-CM

## 2022-03-21 DIAGNOSIS — R26.89 BALANCE DISORDER: ICD-10-CM

## 2022-03-21 DIAGNOSIS — I63.9 CEREBROVASCULAR ACCIDENT (CVA), UNSPECIFIED MECHANISM (HCC): ICD-10-CM

## 2022-03-21 DIAGNOSIS — R53.1 RIGHT SIDED WEAKNESS: Primary | ICD-10-CM

## 2022-03-21 PROCEDURE — 97110 THERAPEUTIC EXERCISES: CPT | Performed by: OCCUPATIONAL THERAPIST

## 2022-03-21 PROCEDURE — 92507 TX SP LANG VOICE COMM INDIV: CPT

## 2022-03-21 PROCEDURE — 97112 NEUROMUSCULAR REEDUCATION: CPT

## 2022-03-21 NOTE — PROGRESS NOTES
Daily Note     Today's date: 3/21/2022  Patient name: Teri Whitaker  : 1965  MRN: [id number]  Referring provider: Asia Burleson MD  Dx:   Encounter Diagnosis     ICD-10-CM    1  Hemiplegia affecting right dominant side, unspecified etiology, unspecified hemiplegia type ([facility] [address] )  G81 50                   Subjective:  " My arm is a little weaker than the other '      Assessment: Pt  Performed all TherEx without complaint  POC modified as pt  Was treated in hand therapy area  Pt  Has good UE functional strength,         DATE 3/17 3/21              Neuro Re-Ed                                        Ther Ex        Deadbug 10 x 2       Supermans 10 x 2       Bird Dog in Quadriped 10        Modified V-ups 10 x 2        Theraband  chest press 10 x 2        Theraband horizontal abduction  10 x 2        Theraband Rows 10 x 2  Blue 3x10      Band Sh ER        Band Sh IR         Band Sh flex 10 x 2  Black 3x10      Band Bicep curls  10 x 2  Black 3x10      Theraband Tricep extension 10 x 2        Squat Chops   UBE 5/5 2 5 level 10m      Anti-rotation core with band         Ther Activity        Clips w  fx'l reach  Blue/black 3 sets                      Cognition                                   Plan: Continue per plan of care        Precautions: Depression, Falls  Re-eval Date: 3/30/2022    Date 3/3 3/8 3/10 3/15 3/17   Visit Count 1 2 3 4 5   FOTO done       Pain In 0 0 0 0 5   Pain Out 0 0 0 0 5

## 2022-03-21 NOTE — PROGRESS NOTES
Daily Speech Treatment Note    Today's date: 3/21/2022  Patients name: Soto Vyas  : 1965  MRN: [id number]  Safety measures:   Referring provider: Rosalva Chen MD    Encounter Diagnosis     ICD-10-CM    1  Dysarthria  R47 1    2  Cerebrovascular accident (CVA), unspecified mechanism ([facility] [address] )  I63 9          Visit Trackin (update from last note)    Subjective/Behavioral:  - Pleasant/Cooperative        Objective/Assessment:  Completed structured activities with patient to target goals below  Results as stated below  Short-term goals:  1  Patient will utilize over-articulation, loudness & slow rate 80% of the time while orally reading multisyllabic words - 9-10 word length sentences to facilitate increased speech intelligibility with independence  3/17: 80% Utilizing strategies in reading multisyllabic words, sentences  Read script at [facility] with overall 80% use of strategies to increase clarity  Vocal adduction exercises completed with overall 12-13 seconds duration with 67-73 Db loudness  3/21: Multisyllabic words 3-5 words: Overall, 80% 3 syllable words, 70% 4-5 syllable words with moderate cues/modeling  Sentences required minimal-moderate cues to overarticulate      2   Patient will complete structured closed ended and open ended conversational tasks with the consistent use of compensatory strategies >80% of the time with fading minimal cues to facilitate increased speech intelligibility   3/17:  Sequencing cooking items (Antonieta Sidle), min cues to promote overarticulation and loudness             Long Term Goals:     1  Patient will independently utilize speech intelligibility strategies (e g , over-exaggeration, slow rate, breath groups, etc ) during oral reading tasks >90% intelligibility to facilitate increased generalization of skills into conversational speech by discharge       2  Patient will improve the ability to facilitate functional speech production skills for intelligible communication including compensatory strategies to promote quality of life and to maximize level of independence with communication  Plan:  -Continue with current plan of care  Loudness exercises, focus on articulation, taking time to process expressive output in conversational tasks

## 2022-03-21 NOTE — PROGRESS NOTES
Daily Note     Today's date: 3/21/2022  Patient name: Filomena Matthews  : 1965  MRN: [id number]  Referring provider: Kym Mata MD  Dx:   Encounter Diagnosis     ICD-10-CM    1  Right sided weakness  R53 1    2  Balance disorder  R26 89    3  Decreased stamina  R53 83    4  Cerebrovascular accident (CVA), unspecified mechanism ([facility] [address] )  I63 9        Start Time: 1001  Stop Time: 1045  Total time in clinic (min): 44 minutes    Subjective: Pt notes that he went for one walk over the weekend  Otherwise did not do much  Arrives with [facility] and AFO  Occasional hip and knee pain  Objective: See treatment diary below      Assessment:  Pt shows still experiencing difficulty with toe clearance during treadmill training, hurdles and with lateral stepping toward the R side  Max verbal cues for proximity of LLE before stepping over caty or step, and to reduce time RLE is in swing  With extended RLE swing, more imbalance  Multiple stepping strategies to maintain balance with cross over step up with more instability during step back  Fatigues quickly with lateral stepping most likely due to hip strength  Reported RLE fatigue post session  Min pain in LLE  Instructed to maintain LLE stretches at home  Patient would benefit from continued PT      Plan: Continue per plan of care  Continue walking bouts as able  RLE strengthening  Dynamic balance  Exercises for RLE toe clearance       Precautions: Depression, Falls  Re-eval Date: 2022    Date 3/10 3/15 3/17 3/21    Visit Count 16 17 18 19    FOTO        Pain In        Pain Out                Testing        TUG 08 01 S PC       5xSTS 06 69 s       Gait speed 0 85 m/s SPC       Lin 33/56       2/6MWT 2mwt 320 ft RW       Manuals         Neuro Re-Ed        Dynamic balance CGA 6' step diagonals 2x10    Bwd walking solo step 6 laps x1 Lateral stepping 4 laps 20ft    Bwd 20ft x2 Marching no UE 2x10 RLE    1x10 BLE Solo stpe Hurdles low 8 laps no AD    Solo step 6' crossover step up 2x10    Static balance RLE WB ball toss x20 solo step Foam wedge balance 20' x3 Lin     Obstacle course        Gait in solo step with focus on increased stride        Color cone taps         High intensity gait Bwd RTB resistance 6 laps x1    Fwd RTB at R knee 6 laps x2 Stairs x3 laps 1 UE assist no rest  Solo step TM 1  1mph 1min > 1  6mph 1min 3min x2    Solo step Lateral stepping RTB 4 laps x2    Solo step Bwd stepping 2laps x2    // bars        Ther Ex   2MWT     SLR x 4        HR/TR        Sit to stand        Step ups  Lat 6' x10 2 UE assist Lat 8' 2x10  Fwd 8' 2x10     Bridges        Abductor str  61' x2 60" x2     Leg press  155lb [id number]              Ther Activity        ADL                Gait Training No AD outside, uneven surfaces, curbs, ramps 10min       Divided Attention        Head turns        Modalities         prn

## 2022-03-23 ENCOUNTER — APPOINTMENT (OUTPATIENT)
Dept: PHYSICAL THERAPY | Facility: CLINIC | Age: 57
End: 2022-03-23
Payer: MEDICARE

## 2022-03-23 ENCOUNTER — APPOINTMENT (OUTPATIENT)
Dept: SPEECH THERAPY | Facility: CLINIC | Age: 57
End: 2022-03-23
Payer: MEDICARE

## 2022-03-25 ENCOUNTER — OFFICE VISIT (OUTPATIENT)
Dept: PHYSICAL THERAPY | Facility: CLINIC | Age: 57
End: 2022-03-25
Payer: MEDICARE

## 2022-03-25 ENCOUNTER — OFFICE VISIT (OUTPATIENT)
Dept: SPEECH THERAPY | Facility: CLINIC | Age: 57
End: 2022-03-25
Payer: MEDICARE

## 2022-03-25 ENCOUNTER — OFFICE VISIT (OUTPATIENT)
Dept: OCCUPATIONAL THERAPY | Facility: CLINIC | Age: 57
End: 2022-03-25
Payer: MEDICARE

## 2022-03-25 DIAGNOSIS — R53.83 DECREASED STAMINA: ICD-10-CM

## 2022-03-25 DIAGNOSIS — R26.89 BALANCE DISORDER: ICD-10-CM

## 2022-03-25 DIAGNOSIS — G81.91 HEMIPLEGIA AFFECTING RIGHT DOMINANT SIDE, UNSPECIFIED ETIOLOGY, UNSPECIFIED HEMIPLEGIA TYPE (HCC): Primary | ICD-10-CM

## 2022-03-25 DIAGNOSIS — R53.1 RIGHT SIDED WEAKNESS: Primary | ICD-10-CM

## 2022-03-25 DIAGNOSIS — R47.1 DYSARTHRIA: Primary | ICD-10-CM

## 2022-03-25 DIAGNOSIS — I63.9 CEREBROVASCULAR ACCIDENT (CVA), UNSPECIFIED MECHANISM (HCC): ICD-10-CM

## 2022-03-25 PROCEDURE — 97112 NEUROMUSCULAR REEDUCATION: CPT

## 2022-03-25 PROCEDURE — 92507 TX SP LANG VOICE COMM INDIV: CPT

## 2022-03-25 PROCEDURE — 97110 THERAPEUTIC EXERCISES: CPT | Performed by: OCCUPATIONAL THERAPIST

## 2022-03-25 NOTE — PROGRESS NOTES
Daily Note     Today's date: 3/25/2022  Patient name: Trinity Wasserman  : 1965  MRN: [id number]  Referring provider: Dustin Duarte MD  Dx:   Encounter Diagnosis     ICD-10-CM    1  Hemiplegia affecting right dominant side, unspecified etiology, unspecified hemiplegia type ([facility] [address] )  G81 91                   Subjective:  " I am okay today"      Assessment: Pt  Performed all TherEx without complaint  POC modified as pt  Was treated in hand therapy area  Pt  Making steady progress of R UE functional strength with tx  DATE 3/17 3/21 3/25             Neuro Re-Ed                                        Ther Ex        Deadbug 10 x 2       Supermans 10 x 2       Bird Dog in Quadriped 10        Modified V-ups 10 x 2        Theraband  chest press 10 x 2   therabar #3 3x10     Theraband horizontal abduction  10 x 2        Theraband Rows 10 x 2  Blue 3x10 Blue 3x10     Band Sh ER        Band Sh IR         Band Sh flex 10 x 2  Black 3x10 Black 3x10     Band Bicep curls  10 x 2  Black 3x10 therabar #3 3x10     Theraband Tricep extension 10 x 2        Squat Chops   UBE  2 5 level 10m UBE   5level 10m     Anti-rotation core with band         Ther Activity        Clips w  fx'l reach  Blue/black 3 sets Black 3 sets                     Cognition                                   Plan: Continue per plan of care        Precautions: Depression, Falls  Re-eval Date: 3/30/2022    Date 3/3 3/8 3/10 3/15 3/17   Visit Count 1 2 3 4 5   FOTO done       Pain In 0 0 0 0 5   Pain Out 0 0 0 0 5

## 2022-03-25 NOTE — PROGRESS NOTES
Daily Speech Treatment Note    Today's date: 3/25/2022  Patients name: Bautista Kirkpatrick  : 1965  MRN: [id number]  Safety measures:   Referring provider: Sarwat Fu MD    Encounter Diagnosis     ICD-10-CM    1  Dysarthria  R47 1    2  Cerebrovascular accident (CVA), unspecified mechanism ([facility] [address] )  I63 9          Visit Trackin (update from last note)    Subjective/Behavioral:  - Pleasant/Cooperative  Patient notes having more knee problems today  Objective/Assessment:  Completed structured activities with patient to target goals below  Results as stated below  Patient noting once a day feels solids feeling "hung up" in esophageal area  Recommended for patient to see a GI doctor for esophageal assessment  Short-term goals:  1  Patient will utilize over-articulation, loudness & slow rate 80% of the time while orally reading multisyllabic words - 9-10 word length sentences to facilitate increased speech intelligibility with independence  3/17: 80% Utilizing strategies in reading multisyllabic words, sentences  Read script at [facility] with overall 80% use of strategies to increase clarity  Vocal adduction exercises completed with overall 12-13 seconds duration with 67-73 Db loudness  3/21: Multisyllabic words 3-5 words: Overall, 80% 3 syllable words, 70% 4-5 syllable words with moderate cues/modeling  Sentences required minimal-moderate cues to overarticulate  3/25: Decreased loudness in speaking at word - conversation level today, but improved articulation  Needed moderate -maximum reminders to utilize loudness  Practiced loudness /ah/ exercises with duration of initially 6 increased to 12 seconds, intensity of 67-73 dB  Utilized pitch exercises        2   Patient will complete structured closed ended and open ended conversational tasks with the consistent use of compensatory strategies >80% of the time with fading minimal cues to facilitate increased speech intelligibility   3/17: Sequencing cooking items (lasangna, cheesecake), min cues to promote overarticulation and loudness  3/25: Initially moderate cues to use loudness and slow speech in tasks and conversation, decreased to minimal cues during the session  Patient notes he "can do it " Patient utilized clear, slow speech in open conversational restaurant role playing with minimal cues           Long Term Goals:     1  Patient will independently utilize speech intelligibility strategies (e g , over-exaggeration, slow rate, breath groups, etc ) during oral reading tasks >90% intelligibility to facilitate increased generalization of skills into conversational speech by discharge       2  Patient will improve the ability to facilitate functional speech production skills for intelligible communication including compensatory strategies to promote quality of life and to maximize level of independence with communication  Plan:  -Continue with current plan of care  Loudness exercises, focus on articulation, taking time to process expressive output in conversational tasks

## 2022-03-25 NOTE — PROGRESS NOTES
Daily Note     Today's date: 3/25/2022  Patient name: Lizbeth Montalvo  : 1965  MRN: [id number]  Referring provider: Emerald Burger MD  Dx:   Encounter Diagnosis     ICD-10-CM    1  Right sided weakness  R53 1    2  Balance disorder  R26 89    3  Decreased stamina  R53 83    4  Cerebrovascular accident (CVA), unspecified mechanism ([address] )  I63 9                   Subjective: Pt reports L knee pain at start of session, 5/10  Objective: See treatment diary below       Assessment:  Pt is cooperative during session, though tolerance for therapy tasks is limited today due to increased complaints of L knee pain  Pt requires frequent seated rest breaks between SoloStep overground training and utilizes [facility] for sidestepping to decrease pain in L knee  Pt continues to require cues for increased R step length and R clearance during swing during both TM and overground gait training  He demonstrates improvement with cues, though carryover between tasks is limited  He does, however, demonstrate improving standing dynamic balance during caty task, performing this task reciprocally with no LOB and no use of AD  He demonstrates improvements in balance, though does remain limited by dec strength, dec coordination, dec motor control, pain, and dec endurance  He continues to benefit from skilled PT services to address these deficits to maximize his functional potential and minimize risk for falls  Plan: Continue per plan of care        Precautions: Depression, Falls  Re-eval Date: 2022    Date 3/10 3/15 3/17 3/21 3/25   Visit Count 16 17 18 19    FOTO        Pain In        Pain Out                Testing        TUG 08 01 S PC       5xSTS 06 69 s       Gait speed 0 85 m/s SPC       Lin 33/56       2/6MWT 2mwt 320 ft RW       Manuals         Neuro Re-Ed        Dynamic balance CGA 6' step diagonals 2x10    Bwd walking solo step 6 laps x1 Lateral stepping 4 laps 20ft    Bwd 20ft x2 Marching no UE 2x10 RLE    1x10 BLE Solo stpe Hurdles low 8 laps no AD    Solo step 6' crossover step up 2x10 SoloStep low hurdles x8 laps using reciprocal pattern   Static balance RLE WB ball toss x20 solo step Foam wedge balance 20' x3 Lin     Obstacle course        Gait in solo step with focus on increased stride        Color cone taps         High intensity gait Bwd RTB resistance 6 laps x1    Fwd RTB at R knee 6 laps x2 Stairs x3 laps 1 UE assist no rest  Solo step TM 1  1mph 1min > 1  6mph 1min 3min x2    Solo step Lateral stepping RTB 4 laps x2    Solo step Bwd stepping 2laps x2 SoloStep  2 TM, 2 trials x3 min at 1 1 mph with standing rest break between trials, VCs for inc R clearance and step length    SoloStep bwd amb 6 laps, VCs for inc R step length    SoloStep lateral stepping BTB x4 laps, VCs for inc R step length to R and inc R clearance during L leading steps   // bars        Ther Ex   2MWT     SLR x 4        HR/TR        Sit to stand        Step ups  Lat 6' x10 2 UE assist Lat 8' 2x10  Fwd 8' 2x10     Bridges        Abductor str  61' x2 60" x2     Leg press  155lb [id number]              Ther Activity        ADL                Gait Training No AD outside, uneven surfaces, curbs, ramps 10min       Divided Attention        Head turns        Modalities         prn

## 2022-03-28 ENCOUNTER — APPOINTMENT (OUTPATIENT)
Dept: SPEECH THERAPY | Facility: CLINIC | Age: 57
End: 2022-03-28
Payer: MEDICARE

## 2022-03-28 ENCOUNTER — APPOINTMENT (OUTPATIENT)
Dept: PHYSICAL THERAPY | Facility: CLINIC | Age: 57
End: 2022-03-28
Payer: MEDICARE

## 2022-03-29 ENCOUNTER — OFFICE VISIT (OUTPATIENT)
Dept: PHYSICAL THERAPY | Facility: CLINIC | Age: 57
End: 2022-03-29
Payer: MEDICARE

## 2022-03-29 ENCOUNTER — APPOINTMENT (OUTPATIENT)
Dept: OCCUPATIONAL THERAPY | Facility: CLINIC | Age: 57
End: 2022-03-29
Payer: MEDICARE

## 2022-03-29 ENCOUNTER — OFFICE VISIT (OUTPATIENT)
Dept: SPEECH THERAPY | Facility: CLINIC | Age: 57
End: 2022-03-29
Payer: MEDICARE

## 2022-03-29 DIAGNOSIS — I63.9 CEREBROVASCULAR ACCIDENT (CVA), UNSPECIFIED MECHANISM (HCC): ICD-10-CM

## 2022-03-29 DIAGNOSIS — R26.89 BALANCE DISORDER: ICD-10-CM

## 2022-03-29 DIAGNOSIS — R47.1 DYSARTHRIA: Primary | ICD-10-CM

## 2022-03-29 DIAGNOSIS — R53.83 DECREASED STAMINA: ICD-10-CM

## 2022-03-29 DIAGNOSIS — R53.1 RIGHT SIDED WEAKNESS: Primary | ICD-10-CM

## 2022-03-29 PROCEDURE — 97110 THERAPEUTIC EXERCISES: CPT

## 2022-03-29 PROCEDURE — 92507 TX SP LANG VOICE COMM INDIV: CPT

## 2022-03-29 PROCEDURE — 97112 NEUROMUSCULAR REEDUCATION: CPT

## 2022-03-29 NOTE — PROGRESS NOTES
Daily Note     Today's date: 3/29/2022  Patient name: Liliana Lan  : 1965  MRN: [id number]  Referring provider: Munira Chan MD  Dx:   Encounter Diagnosis     ICD-10-CM    1  Right sided weakness  R53 1    2  Balance disorder  R26 89    3  Decreased stamina  R53 83    4  Cerebrovascular accident (CVA), unspecified mechanism ([facility] [address] )  I63 9        Start Time: 1131  Stop Time: 1215  Total time in clinic (min): 44 minutes    Subjective: Pt reports he has not been doing his exercises recently  Arrives with AFO and SPC  Going to [employer] later this week  Objective: See treatment diary below       Assessment:  1 LOB during a turn with solo step and PT assist to recover  Limited amount of seated rests during session  Pt shows loss of form near end of sets for step taps, noting fatigued R hip flexor  Cues for faster pacing during hurdles as with longer SLS, reduced R hip flexion  With more R hip flexor fatigue, shows more consistent stepping on hurdles which required seated rest to recover form  Consistent cues for longer R step length and knee drive for toe clearance during gait training  Still shows limitations with endurance, hip strength, and gait stability  Pt would continue to benefit from skilled PT to improve endurance, gait, and overall strength  Plan: Continue per plan of care  Monitor L knee/hip  Monitor HEP         Precautions: Depression, Falls  Re-eval Date: 2022    Date 3/29       Visit Count 21       FOTO        Pain In        Pain Out                Testing        TUG 08 01 S PC       5xSTS 06 69 s       Gait speed 0 85 m/s SPC       Lin 33/56       2/6MWT 2mwt 320 ft RW       Manuals         Neuro Re-Ed        Dynamic balance Alt step taps 8' step 2x20, 1x10    Solo step low hurdles 2x8 laps, reciprocal pattern       Static balance        Obstacle course        Gait in solo step with focus on increased stride        Color cone taps         High intensity gait Solo step 3 TM trials, 2min x3 1  2mph standing rests between       // bars        Ther Ex        SLR x 4 Abd 3x15 ea // bar       HR/TR        Sit to stand        Step ups        Bridges        Abductor str        Leg press                Ther Activity        ADL                Gait Training        Divided Attention        Head turns        Modalities         prn

## 2022-03-29 NOTE — PROGRESS NOTES
Daily Speech Treatment Note    Today's date: 3/29/2022  Patients name: Farshad Nava  : 1965  MRN: [id number]  Safety measures:   Referring provider: Jesus Love MD    Encounter Diagnosis     ICD-10-CM    1  Dysarthria  R47 1    2  Cerebrovascular accident (CVA), unspecified mechanism ([facility] [address] )  I63 9          Visit Trackin    Subjective/Behavioral:  - Pleasant/Cooperative    -Patient looking forward to "farm sale" this coming Saturday   -Provided information on [employer] stroke support group  -Encouraged home practice of LOUD exercises        Objective/Assessment:  Completed structured activities with patient to target goals below  Results as stated below  Short-term goals:  1  Patient will utilize over-articulation, loudness & slow rate 80% of the time while orally reading multisyllabic words - 9-10 word length sentences to facilitate increased speech intelligibility with independence  3/17: 80% Utilizing strategies in reading multisyllabic words, sentences  Read script at [facility] with overall 80% use of strategies to increase clarity  Vocal adduction exercises completed with overall 12-13 seconds duration with 67-73 Db loudness  3/21: Multisyllabic words 3-5 words: Overall, 80% 3 syllable words, 70% 4-5 syllable words with moderate cues/modeling  Sentences required minimal-moderate cues to overarticulate  3/25: Decreased loudness in speaking at word - conversation level today, but improved articulation  Needed moderate -maximum reminders to utilize loudness  Practiced loudness /ah/ exercises with duration of initially 6 increased to 12 seconds, intensity of 67-73 dB  Utilized pitch exercises  3/29: Utilized "level 6" /ah/ exercises 68-75 dB, range of 7-17 seconds with encouragement & initial modeling, loud/high pitch: 100-180 Hz, pitch exercises with loudness noted 66-73 dB  Read functional phrases with self correction, minimal cues with strategies to increase clarity- 70% accuracy     2   Patient will complete structured closed ended and open ended conversational tasks with the consistent use of compensatory strategies >80% of the time with fading minimal cues to facilitate increased speech intelligibility  3/17:  Sequencing cooking items (lasangna, cheesecake), min cues to promote overarticulation and loudness  3/25: Initially moderate cues to use loudness and slow speech in tasks and conversation, decreased to minimal cues during the session  Patient notes he "can do it " Patient utilized clear, slow speech in open conversational restaurant role playing with minimal cues  3/29: Answering questions, spontaneous simple sentences & conversation: Moderate reminders/cues to use slow speech and overarticulate for increased clarity               Long Term Goals:     1  Patient will independently utilize speech intelligibility strategies (e g , over-exaggeration, slow rate, breath groups, etc ) during oral reading tasks >90% intelligibility to facilitate increased generalization of skills into conversational speech by discharge       2  Patient will improve the ability to facilitate functional speech production skills for intelligible communication including compensatory strategies to promote quality of life and to maximize level of independence with communication  Plan:  -Continue with current plan of care  Loudness exercises, focus on articulation, taking time to process expressive output in conversational tasks

## 2022-03-30 ENCOUNTER — APPOINTMENT (OUTPATIENT)
Dept: PHYSICAL THERAPY | Facility: CLINIC | Age: 57
End: 2022-03-30
Payer: MEDICARE

## 2022-03-30 ENCOUNTER — APPOINTMENT (OUTPATIENT)
Dept: SPEECH THERAPY | Facility: CLINIC | Age: 57
End: 2022-03-30
Payer: MEDICARE

## 2022-03-30 NOTE — TELEPHONE ENCOUNTER
Please call  Let the patient know a home study to get the sleep study is not approved He will need a in lab ( in hospital) study  As alternative He can see sleep medicine specialist first  I do think it is important for him to get this done  I will place the orders for sleep study and referral to sleep medicine  JOSÉ LUIS Jeffers

## 2022-04-01 ENCOUNTER — OFFICE VISIT (OUTPATIENT)
Dept: SPEECH THERAPY | Facility: CLINIC | Age: 57
End: 2022-04-01
Payer: MEDICARE

## 2022-04-01 ENCOUNTER — OFFICE VISIT (OUTPATIENT)
Dept: OCCUPATIONAL THERAPY | Facility: CLINIC | Age: 57
End: 2022-04-01
Payer: MEDICARE

## 2022-04-01 ENCOUNTER — OFFICE VISIT (OUTPATIENT)
Dept: PHYSICAL THERAPY | Facility: CLINIC | Age: 57
End: 2022-04-01
Payer: MEDICARE

## 2022-04-01 DIAGNOSIS — R47.1 DYSARTHRIA: Primary | ICD-10-CM

## 2022-04-01 DIAGNOSIS — I63.9 CEREBROVASCULAR ACCIDENT (CVA), UNSPECIFIED MECHANISM (HCC): ICD-10-CM

## 2022-04-01 DIAGNOSIS — G81.91 HEMIPLEGIA AFFECTING RIGHT DOMINANT SIDE, UNSPECIFIED ETIOLOGY, UNSPECIFIED HEMIPLEGIA TYPE (HCC): Primary | ICD-10-CM

## 2022-04-01 DIAGNOSIS — R26.89 BALANCE DISORDER: ICD-10-CM

## 2022-04-01 DIAGNOSIS — R53.83 DECREASED STAMINA: ICD-10-CM

## 2022-04-01 DIAGNOSIS — R53.1 RIGHT SIDED WEAKNESS: Primary | ICD-10-CM

## 2022-04-01 PROCEDURE — 97530 THERAPEUTIC ACTIVITIES: CPT | Performed by: OCCUPATIONAL THERAPIST

## 2022-04-01 PROCEDURE — 97110 THERAPEUTIC EXERCISES: CPT | Performed by: OCCUPATIONAL THERAPIST

## 2022-04-01 PROCEDURE — 92507 TX SP LANG VOICE COMM INDIV: CPT

## 2022-04-01 PROCEDURE — 97112 NEUROMUSCULAR REEDUCATION: CPT

## 2022-04-01 PROCEDURE — 97110 THERAPEUTIC EXERCISES: CPT

## 2022-04-01 NOTE — PROGRESS NOTES
Daily Note     Today's date: 2022  Patient name: Tirso Livingston  : 1965  MRN: [id number]  Referring provider: Tip Osborne MD  Dx:   Encounter Diagnosis     ICD-10-CM    1  Hemiplegia affecting right dominant side, unspecified etiology, unspecified hemiplegia type ([facility] [address] )  G81 91                   Subjective:  " I am okay today"      Assessment: Pt  Performed all TherEx without complaint  POC modified as pt  Was treated in hand therapy area  Pt  Making steady progress of R UE functional strength with tx  DATE 3/17 3/21 3/25 4/1            Neuro Re-Ed                                        Ther Ex        Deadbug 10 x 2       Supermans 10 x 2       Bird Dog in Quadriped 10        Modified V-ups 10 x 2        Theraband  chest press 10 x 2   therabar #3 3x10 Therabar #5 3x10    Theraband horizontal abduction  10 x 2        Theraband Rows 10 x 2  Blue 3x10 Blue 3x10 Blue 3x10    Band Sh ER        Band Sh IR         Band Sh flex 10 x 2  Black 3x10 Black 3x10 Black 3x10    Band Bicep curls  10 x 2  Black 3x10 therabar #3 3x10 therabar #5 3x10    Theraband Tricep extension 10 x 2        Squat Chops   UBE 5/5 2 5 level 10m UBE /52  5level 10m UBE 5/5 2 5 level    Anti-rotation core with band         Ther Activity        Clips w  fx'l reach  Blue/black 3 sets Black 3 sets Black 3 sets                    Cognition                                   Plan: Continue per plan of care        Precautions: Depression, Falls  Re-eval Date: 3/30/2022    Date 3/3 3/8 3/10 3/15 3/17   Visit Count 1 2 3 4 5   FOTO done       Pain In 0 0 0 0 5   Pain Out 0 0 0 0 5

## 2022-04-01 NOTE — PROGRESS NOTES
Daily Note     Today's date: 2022  Patient name: Balta Deleon  : 1965  MRN: [id number]  Referring provider: Ewa Paula MD  Dx:   Encounter Diagnosis     ICD-10-CM    1  Right sided weakness  R53 1    2  Balance disorder  R26 89    3  Decreased stamina  R53 83    4  Cerebrovascular accident (CVA), unspecified mechanism ([address] )  I63 9        Start Time: 1320          Subjective: Pt reports that he hasn't walked much d/t the weather  His LLE feels more fatigued than pain today  Objective: See treatment diary below       Assessment:  With consistent verbal cues he demonstrates good ability to clear hurdles, improved foot clearance even with 1lb cuff weight  Limited rests to further improve patient endurance  Use of foam for RLE weight bearing exercise for visual cue to reduce LLE WB  With exercises near end of session, note less carryover with RLE foot clearance  Overall shows good aerobic tolerance today and reduced LLE pain  Loss of form with hurdles and step up used to guide treatment  Instructed on getting out for walks this weekend with his significant other for safety  Pt would continue to benefit from skilled PT to improve endurance, gait, and overall strength  Plan: Continue per plan of care  Monitor L knee/hip  Monitor HEP         Precautions: Depression, Falls  Re-eval Date: 2022    Date 3/29 4/1      Visit Count 21 22      FOTO        Pain In        Pain Out                Testing        TUG 08 01 S PC       5xSTS 06 69 s       Gait speed 0 85 m/s SPC       Lin 33/56       2/6MWT 2mwt 320 ft RW       Manuals         Neuro Re-Ed        Dynamic balance Alt step taps 8' step 2x20, 1x10    Solo step low hurdles 2x8 laps, reciprocal pattern RLE step tap 4' x12      Static balance  RLE WB with LLE foam step up hold 1min x2      Obstacle course        Gait in solo step with focus on increased stride        Color cone taps         High intensity gait Solo step 3 TM trials, 2min x3 1 2mph standing rests between Solo step overground - compliant mat + hurdles 1lb aw on RLE 6 laps x2    4' step + 2 hurdles RTB resistance 4 laps x2      // bars        Ther Ex        SLR x 4 Abd 3x15 ea // bar       HR/TR        Sit to stand        Step ups  Lat step ups 4' 2x12      Bridges        Abductor str        Leg press                Ther Activity        ADL                Gait Training        Divided Attention        Head turns        Modalities         prn

## 2022-04-01 NOTE — PROGRESS NOTES
Daily Speech Treatment Note    Today's date: 2022  Patients name: Lucius Harman  : 1965  MRN: [id number]  Safety measures:   Referring provider: Perlita Love MD    Encounter Diagnosis     ICD-10-CM    1  Dysarthria  R47 1    2  Cerebrovascular accident (CVA), unspecified mechanism ([facility] [address] )  I63 9          Visit Trackin    Subjective/Behavioral:  - Patient arrived 20 minutes late to therapy session  He was unaware that he was late  Objective/Assessment:  Completed structured activities with patient to target goals below  Results as stated below  Short-term goals:  1  Patient will utilize over-articulation, loudness & slow rate 80% of the time while orally reading multisyllabic words - 9-10 word length sentences to facilitate increased speech intelligibility with independence  3/17: 80% Utilizing strategies in reading multisyllabic words, sentences  Read script at [facility] with overall 80% use of strategies to increase clarity  Vocal adduction exercises completed with overall 12-13 seconds duration with 67-73 Db loudness  3/21: Multisyllabic words 3-5 words: Overall, 80% 3 syllable words, 70% 4-5 syllable words with moderate cues/modeling  Sentences required minimal-moderate cues to overarticulate  3/25: Decreased loudness in speaking at word - conversation level today, but improved articulation  Needed moderate -maximum reminders to utilize loudness  Practiced loudness /ah/ exercises with duration of initially 6 increased to 12 seconds, intensity of 67-73 dB  Utilized pitch exercises  3/29: Utilized "level 6" /ah/ exercises 68-75 dB, range of 7-17 seconds with encouragement & initial modeling, loud/high pitch: 100-180 Hz, pitch exercises with loudness noted 66-73 dB    Read functional phrases with self correction, minimal cues with strategies to increase clarity- 70% accuracy        2   Patient will complete structured closed ended and open ended conversational tasks with the consistent use of compensatory strategies >80% of the time with fading minimal cues to facilitate increased speech intelligibility  3/17:  Sequencing cooking items (lasangna, cheesecake), min cues to promote overarticulation and loudness  3/25: Initially moderate cues to use loudness and slow speech in tasks and conversation, decreased to minimal cues during the session  Patient notes he "can do it " Patient utilized clear, slow speech in open conversational restaurant role playing with minimal cues  3/29: Answering questions, spontaneous simple sentences & conversation: Moderate reminders/cues to use slow speech and overarticulate for increased clarity         4/1:    LSVT: The following data was collected using Voice [employer] on Iphone  50 cm distance from microphone  Daily Task #1   LOUD /a/: avg time 14 91 sec                     avg dB SPL 71 3 dB  Perceived level of effort: 5-6/10  Cues for Loudness: min-mod    Daily Task #2  Maximum fundamental frequency range measured in Hz  Average Pitch  (HI): 276 17 Hz (78 dB)  Average Pitch (LO): 78 17 Hz (49-57 dB)  Perceived level of effort: 5/10  Cues for Loudness: Mod-Max (low pitch only)  Cues for Pitch:  Mod-Max    Daily Task #3  Maximum speech loudness drill of functional phrases  Average dB SPL: 75 dB   Perceived level of effort: 5/10  Cues for Loudness: mod-max  Strategies for speech: tapping table to slow rate of speech - patient demonstrated improved ability to slow rate of speech and improve intelligibility utilizing this technique       Long Term Goals:     1  Patient will independently utilize speech intelligibility strategies (e g , over-exaggeration, slow rate, breath groups, etc ) during oral reading tasks >90% intelligibility to facilitate increased generalization of skills into conversational speech by discharge       2  Patient will improve the ability to facilitate functional speech production skills for intelligible communication including compensatory strategies to promote quality of life and to maximize level of independence with communication  Plan:  -Continue with current plan of care  Loudness exercises, focus on articulation, taking time to process expressive output in conversational tasks

## 2022-04-05 ENCOUNTER — OFFICE VISIT (OUTPATIENT)
Dept: PHYSICAL THERAPY | Facility: CLINIC | Age: 57
End: 2022-04-05
Payer: MEDICARE

## 2022-04-05 ENCOUNTER — OFFICE VISIT (OUTPATIENT)
Dept: OCCUPATIONAL THERAPY | Facility: CLINIC | Age: 57
End: 2022-04-05
Payer: MEDICARE

## 2022-04-05 DIAGNOSIS — R53.1 RIGHT SIDED WEAKNESS: Primary | ICD-10-CM

## 2022-04-05 DIAGNOSIS — G81.91 HEMIPLEGIA AFFECTING RIGHT DOMINANT SIDE, UNSPECIFIED ETIOLOGY, UNSPECIFIED HEMIPLEGIA TYPE (HCC): Primary | ICD-10-CM

## 2022-04-05 DIAGNOSIS — I63.9 CEREBROVASCULAR ACCIDENT (CVA), UNSPECIFIED MECHANISM (HCC): ICD-10-CM

## 2022-04-05 DIAGNOSIS — R53.83 DECREASED STAMINA: ICD-10-CM

## 2022-04-05 DIAGNOSIS — R26.89 BALANCE DISORDER: ICD-10-CM

## 2022-04-05 PROCEDURE — 97110 THERAPEUTIC EXERCISES: CPT

## 2022-04-05 PROCEDURE — 97112 NEUROMUSCULAR REEDUCATION: CPT

## 2022-04-05 NOTE — PROGRESS NOTES
Daily Note     Today's date: 2022  Patient name: Karla Johnson  : 1965  MRN: [id number]  Referring provider: Luis Miguel Martinez MD  Dx:   Encounter Diagnosis     ICD-10-CM    1  Hemiplegia affecting right dominant side, unspecified etiology, unspecified hemiplegia type ([facility] [address] )  G81 91        Start Time: 1315  Stop Time: 1400  Total time in clinic (min): 45 minutes    Subjective:  Pt without complaints       Assessment: See treatment diary below     Focus of session on core stability, proximal strength/ endurance, distal strength and coordination, and prehension skills  Neuro re-ed: pt able to use R hand to crumble small pieces of paper, mod difficulty with maintaining grasp on paper while manipulating it between fingertips  Pt with mod difficulty with transferring paper from palm to fingertips  Pt able to use R hand pincer on tiny clothespins to  small pieces of paper and transfer onto targets, completed with mod difficulty and increased time  DATE 3/17 3/21 3/25 4/1 4/5           Neuro Re-Ed     Quadruped position with 3 and 2 limb support with min manual cues                                   Ther Ex        Deadbug 10 x 2    5reps x 4 sets with blue band   Supermans 10 x 2       Bird Dog in Quadriped 10     5 each side   Modified V-ups 10 x 2    5 reps x 2    Theraband  chest press 10 x 2   therabar #3 3x10 Therabar #5 3x10 Orange 10 reps x 2 in  stance   Theraband horizontal abduction  10 x 2        Theraband Rows 10 x 2  Blue 3x10 Blue 3x10 Blue 3x10 Blue 2 x 10 in stance   Band Sh ER        Band Sh IR         Band Sh flex 10 x 2  Black 3x10 Black 3x10 Black 3x10 10 x 2; orange band    Band Bicep curls  10 x 2  Black 3x10 therabar #3 3x10 therabar #5 3x10    Theraband Tricep extension 10 x 2     10 reps x 2 blue band   Squat Chops   UBE  2 5 level 10m UBE   5level 10m UBE  2 5 level 10 reps x 2 purple band   Anti-rotation core with band         Ther Activity        Clips w  fx'l reach  Blue/black 3 sets Black 3 sets Black 3 sets Small clothespins                   Cognition                                   Plan: Continue per plan of care        Precautions: Depression, Falls  Re-eval Date: 3/30/2022    Date 4/5 3/8 3/10 3/15 3/17   Visit Count 9 2 3 4 5   FOTO        Pain In 0 0 0 0 5   Pain Out 0 0 0 0 5

## 2022-04-05 NOTE — PROGRESS NOTES
Daily Note     Today's date: 2022  Patient name: Balta Deleon  : 1965  MRN: [id number]  Referring provider: Ewa Paula MD  Dx:   Encounter Diagnosis     ICD-10-CM    1  Right sided weakness  R53 1    2  Balance disorder  R26 89    3  Decreased stamina  R53 83    4  Cerebrovascular accident (CVA), unspecified mechanism ([address] )  I63 9        Start Time: 1017  Stop Time: 1100  Total time in clinic (min): 43 minutes    Subjective: Pt reports that he went to an auction and walked around a lot  His boots really weighed him down  He felt like he got a good workout  Prepping for meeting later today  Objective: See treatment diary below       Assessment:  With consistent verbal cues he demonstrates improved foot clearance on treadmill  Verbal cues for knee drive and longer step length on R LE throughout  Tendency to circumduct to compensate with inadequate knee flexion  Improved pacing with lateral step ups with use of UE assist, able to take bigger steps  Did not use solo step secondary to another patient using it  Step taps to facilitate R hip flexion to assist in R swing phase  Pt shows good stability with reciprocal pattern with no AD, occasional stepping strategy to catch balance  Less rests breaks during today's session  Pt would continue to benefit from skilled PT to improve endurance, gait, and overall strength  Plan: Continue per plan of care  High intensity gait training  R LE strength          Precautions: Depression, Falls  Re-eval Date: 2022    Date 3/29 4/1 4/5     Visit Count 21 22 23     FOTO        Pain In        Pain Out                Testing        TUG 08 01 S PC       5xSTS 06 69 s       Gait speed 0 85 m/s SPC       Lin 33/56       2/6MWT 2mwt 320 ft RW       Manuals         Neuro Re-Ed        Dynamic balance Alt step taps 8' step 2x20, 1x10    Solo step low hurdles 2x8 laps, reciprocal pattern RLE step tap 4' x12 RLE step tap 8' x20, x12, x10 1lb aw     Static balance RLE WB with LLE foam step up hold 1min x2      Obstacle course        Gait in solo step with focus on increased stride        Color cone taps         High intensity gait Solo step 3 TM trials, 2min x3 1  2mph standing rests between Solo step overground - compliant mat + hurdles 1lb aw on RLE 6 laps x2    4' step + 2 hurdles RTB resistance 4 laps x2 Solo step TM 1min 1  3mph > 1 7mph 1min x 1lb aw RLE    1 7mph 1min 45s x2 1lb aw RLE    3 hurdles + 4' step up reciprocal pattern 10 laps     // bars        Ther Ex        SLR x 4 Abd 3x15 ea // bar       HR/TR        Sit to stand        Step ups  Lat step ups 4' 2x12 Lat step ups // bars UE assist 4' 2x12     Bridges        Abductor str        Leg press                Ther Activity        ADL                Gait Training        Divided Attention        Head turns        Modalities         prn

## 2022-04-07 ENCOUNTER — OFFICE VISIT (OUTPATIENT)
Dept: OCCUPATIONAL THERAPY | Facility: CLINIC | Age: 57
End: 2022-04-07
Payer: MEDICARE

## 2022-04-07 ENCOUNTER — OFFICE VISIT (OUTPATIENT)
Dept: PHYSICAL THERAPY | Facility: CLINIC | Age: 57
End: 2022-04-07
Payer: MEDICARE

## 2022-04-07 ENCOUNTER — APPOINTMENT (OUTPATIENT)
Dept: SPEECH THERAPY | Facility: CLINIC | Age: 57
End: 2022-04-07
Payer: MEDICARE

## 2022-04-07 DIAGNOSIS — G81.91 HEMIPLEGIA AFFECTING RIGHT DOMINANT SIDE, UNSPECIFIED ETIOLOGY, UNSPECIFIED HEMIPLEGIA TYPE (HCC): Primary | ICD-10-CM

## 2022-04-07 DIAGNOSIS — R53.83 DECREASED STAMINA: ICD-10-CM

## 2022-04-07 DIAGNOSIS — R26.89 BALANCE DISORDER: ICD-10-CM

## 2022-04-07 DIAGNOSIS — R53.1 RIGHT SIDED WEAKNESS: Primary | ICD-10-CM

## 2022-04-07 DIAGNOSIS — I63.9 CEREBROVASCULAR ACCIDENT (CVA), UNSPECIFIED MECHANISM (HCC): ICD-10-CM

## 2022-04-07 PROCEDURE — 97112 NEUROMUSCULAR REEDUCATION: CPT

## 2022-04-07 PROCEDURE — 97140 MANUAL THERAPY 1/> REGIONS: CPT

## 2022-04-07 PROCEDURE — 97110 THERAPEUTIC EXERCISES: CPT

## 2022-04-07 NOTE — PROGRESS NOTES
Daily Note     Today's date: 2022  Patient name: Sarah Myers  : 1965  MRN: [id number]  Referring provider: Niraj Bailey MD  Dx:   Encounter Diagnosis     ICD-10-CM    1  Right sided weakness  R53 1    2  Balance disorder  R26 89    3  Decreased stamina  R53 83    4  Cerebrovascular accident (CVA), unspecified mechanism ([address] )  I63 9        Start Time: 1016  Stop Time: 1058  Total time in clinic (min): 42 minutes    Subjective: Pt reports that he is feeling okay today  No new complaints  Min hip and knee pain  Objective: See treatment diary below       Assessment:  With consistent verbal cues he demonstrates improved foot clearance on treadmill  Used visual cue to assist with step length  Verbal cues for knee drive and longer step length on R LE during hurdles and other gait tasks  Requires UE assist on treadmill for balance  Still displays inadequate knee extension on RLE  Improved lateral step length + balance  Re-introduced leg press for further RLE hip strengthening  Less rests breaks during today's session  Noted RLE fatigue post session  Pt would continue to benefit from skilled PT to improve endurance, gait, and overall strength  Plan: Continue per plan of care  High intensity gait training  R LE strength          Precautions: Depression, Falls  Re-eval Date: 2022    Date 3/29 4/1 4/5 4/7    Visit Count 21 22 23 24    FOTO        Pain In        Pain Out                Testing        TUG 08 01 S PC       5xSTS 06 69 s       Gait speed 0 85 m/s SPC       Lin 33/56       2/6MWT 2mwt 320 ft RW       Manuals         Neuro Re-Ed        Dynamic balance Alt step taps 8' step 2x20, 1x10    Solo step low hurdles 2x8 laps, reciprocal pattern RLE step tap 4' x12 RLE step tap 8' x20, x12, x10 1lb aw 1 UE RLE step tap 4x10 8' 2lb aw    CGA Lat stepping 15ft x4 laps    CGA Resisted bwd walking 15ft x4 laps    Static balance  RLE WB with LLE foam step up hold 1min x2      Obstacle course Gait in solo step with focus on increased stride        Color cone taps         High intensity gait Solo step 3 TM trials, 2min x3 1  2mph standing rests between Solo step overground - compliant mat + hurdles 1lb aw on RLE 6 laps x2    4' step + 2 hurdles RTB resistance 4 laps x2 Solo step TM 1min 1  3mph > 1 7mph 1min x 1lb aw RLE    1 7mph 1min 45s x2 1lb aw RLE    3 hurdles + 4' step up reciprocal pattern 10 laps Solo step TM 2min x2  1  8mph no aw    CGA Low hurdles 6 laps cues for longer steps, knee drive        // bars        Ther Ex        SLR x 4 Abd 3x15 ea // bar       HR/TR        Sit to stand        Step ups  Lat step ups 4' 2x12 Lat step ups // bars UE assist 4' 2x12 Lat step ups // bars UE assist 6' 2x12    Bridges        Abductor str        Leg press    RLE 135lb 2x10            Ther Activity        ADL                Gait Training        Divided Attention        Head turns        Modalities         prn

## 2022-04-07 NOTE — PROGRESS NOTES
Daily Note     Today's date: 2022  Patient name: Garima Mccray  : 1965  MRN: [id number]  Referring provider: Alejandra Blas MD  Dx:   Encounter Diagnosis     ICD-10-CM    1  Hemiplegia affecting right dominant side, unspecified etiology, unspecified hemiplegia type ([facility] )  G81 91        Start Time: 0920  Stop Time: 1005  Total time in clinic (min): 45 minutes    Subjective:  Pt c/o pain/ tightness in L cervical/ sh area    Objective: See treatment diary below     Assessment: Focus of session on core stability, proximal strength/ endurance, distal strength and coordination, and prehension skills  Neuro re-ed: pt able to use tripod grasp to manipulate yellow resistive clothespin to  and relocate small flat items onto target  Pt attempted with red and blue resistive clothespins, however had max difficulty maintaining grasp and controlling item during relocation, so was downgraded to yellow clothespin  Pt able to use pincer grasp to  flat small objects to flip over with min difficulty  Pt with min difficulty with in-hand manipulations  Pt reports min difficulty at shoulder maintaining sustained sh flexion during tabletop task  Pt reporting pain/ tightness in L side cervical/ sh area  Completed cervical stretches, scapular mobilizations and pec mobilizations  Pt reporting decreased tightness after intervention  Pt completed core stability exercises in developmental position of quadruped, with focus on facilitating core musculature and proximal stability  Pt required min manual cues for muscle activation  Pt with improved ability to sustain quadruped during 3 limb support and during bird dog position  Henry well           DATE 4/7 3/21 3/25 4/1 4/5           Neuro Re-Ed     Quadruped position with 3 and 2 limb support with min manual cues           Manual Cervical stretches                       Ther Ex        Deadbug 5 x 4    5reps x 4 sets with blue band   Supermans        Bird Dog in Quadriped 5 x 4 sets    5 each side   Modified V-ups 5 x 4 sets    5 reps x 2    Theraband  chest press   therabar #3 3x10 Therabar #5 3x10 Orange 10 reps x 2 in  stance   Theraband horizontal abduction         Theraband Rows   Blue 3x10 Blue 3x10 Blue 3x10 Blue 2 x 10 in stance   Band Sh ER        Band Sh IR         Band Sh flex  Black 3x10 Black 3x10 Black 3x10 10 x 2; orange band    Band Bicep curls   Black 3x10 therabar #3 3x10 therabar #5 3x10    Theraband Tricep extension     10 reps x 2 blue band   Squat Chops   UBE 5/5 2 5 level 10m UBE 5/52  5level 10m UBE 5/5 2 5 level 10 reps x 2 purple band   Anti-rotation core with band         Ther Activity        Clips w  fx'l reach Yellow resistive clothespins to  flat small items and relocate onto targets, mod difficulty maintaining tripod grasp on clothespin Blue/black 3 sets Black 3 sets Black 3 sets Small clothespins                   Cognition                                   Plan: Continue per plan of care        Precautions: Depression, Falls  Re-eval Date: 3/30/2022    Date 4/5 4/7 3/10 3/15 3/17   Visit Count 9 10 3 4 5   FOTO        Pain In 0 0 0 0 5   Pain Out 0 0 0 0 5

## 2022-04-12 ENCOUNTER — OFFICE VISIT (OUTPATIENT)
Dept: PHYSICAL THERAPY | Facility: CLINIC | Age: 57
End: 2022-04-12
Payer: MEDICARE

## 2022-04-12 ENCOUNTER — OFFICE VISIT (OUTPATIENT)
Dept: OCCUPATIONAL THERAPY | Facility: CLINIC | Age: 57
End: 2022-04-12
Payer: MEDICARE

## 2022-04-12 DIAGNOSIS — R53.1 RIGHT SIDED WEAKNESS: Primary | ICD-10-CM

## 2022-04-12 DIAGNOSIS — G81.91 HEMIPLEGIA AFFECTING RIGHT DOMINANT SIDE, UNSPECIFIED ETIOLOGY, UNSPECIFIED HEMIPLEGIA TYPE (HCC): Primary | ICD-10-CM

## 2022-04-12 DIAGNOSIS — R26.89 BALANCE DISORDER: ICD-10-CM

## 2022-04-12 DIAGNOSIS — R53.83 DECREASED STAMINA: ICD-10-CM

## 2022-04-12 DIAGNOSIS — I63.9 CEREBROVASCULAR ACCIDENT (CVA), UNSPECIFIED MECHANISM (HCC): ICD-10-CM

## 2022-04-12 PROCEDURE — 97112 NEUROMUSCULAR REEDUCATION: CPT

## 2022-04-12 NOTE — PROGRESS NOTES
Daily Note     Today's date: 2022  Patient name: Eliu Dominguez  : 1965  MRN: [id number]  Referring provider: Jeniffer Haley MD  Dx:   Encounter Diagnosis     ICD-10-CM    1  Right sided weakness  R53 1    2  Balance disorder  R26 89    3  Decreased stamina  R53 83    4  Cerebrovascular accident (CVA), unspecified mechanism ([address] )  I63 9        Start Time: 1016  Stop Time: 1100  Total time in clinic (min): 44 minutes    Subjective: Pt reports that he is feeling good today  No complaints  No falls recently  Was not walking much over the weekend  Objective: See treatment diary below       Assessment:  Tolerated high intensity gait training well, min reset breaks needed showing improved aerobic tolerance  Improved foot clearance with stairs even with 2lb ankle weight present  With consistent verbal cues he demonstrates improved foot clearance on treadmill at faster pacing  More consistent step length with 1 UE assist  Still demonstrating trendelenburg with R stance phase  Good pacing with lateral steps improved with consistent cueing for step length  Encouraged to keep walking with partner for safety to further improve mobility  Noted RLE fatigue post session  Pt would continue to benefit from skilled PT to improve endurance, gait, and overall strength  Plan: Continue per plan of care  High intensity gait training  R LE strength  Potential discharge nv         Precautions: Depression, Falls  Re-eval Date: 2022    Date 3/29 4/1 4/5 4/7 4/12   Visit Count 21 22 23 24 25   FOTO        Pain In        Pain Out                Testing        TUG 08 01 S PC       5xSTS 06 69 s       Gait speed 0 85 m/s SPC       Lin 33/56       2/6MWT 2mwt 320 ft RW       Manuals         Neuro Re-Ed        Dynamic balance Alt step taps 8' step 2x20, 1x10    Solo step low hurdles 2x8 laps, reciprocal pattern RLE step tap 4' x12 RLE step tap 8' x20, x12, x10 1lb aw 1 UE RLE step tap 4x10 8' 2lb aw    CGA Lat stepping 15ft x4 laps    CGA Resisted bwd walking 15ft x4 laps 1 UE RLE // bar step tap 3x20 2lb 8'    CGA Lat steps 15ft x6 cues for pacing   Static balance  RLE WB with LLE foam step up hold 1min x2      Obstacle course        Gait in solo step with focus on increased stride        Color cone taps         High intensity gait Solo step 3 TM trials, 2min x3 1  2mph standing rests between Solo step overground - compliant mat + hurdles 1lb aw on RLE 6 laps x2    4' step + 2 hurdles RTB resistance 4 laps x2 Solo step TM 1min 1  3mph > 1 7mph 1min x 1lb aw RLE    1 7mph 1min 45s x2 1lb aw RLE    3 hurdles + 4' step up reciprocal pattern 10 laps Solo step TM 2min x2  1  8mph no aw    CGA Low hurdles 6 laps cues for longer steps, knee drive     CGA Stairs 2 laps 1 UE handrail cues for inc pacing, reciprocal RPE 7    TM 1  8mph no aw 1 UE 2min x3, 1 min @ 1 9mph   // bars        Ther Ex        SLR x 4 Abd 3x15 ea // bar       HR/TR        Sit to stand        Step ups  Lat step ups 4' 2x12 Lat step ups // bars UE assist 4' 2x12 Lat step ups // bars UE assist 6' 2x12 Fwd step up // bars 8' with weighted vest, x20   Bridges        Abductor str        Leg press    RLE 135lb 2x10            Ther Activity        ADL                Gait Training        Divided Attention        Head turns        Modalities         prn

## 2022-04-12 NOTE — PROGRESS NOTES
Daily Note     Today's date: 2022  Patient name: Garima Mccray  : 1965  MRN: [id number]  Referring provider: Alejandra Blas MD  Dx:   Encounter Diagnosis     ICD-10-CM    1  Hemiplegia affecting right dominant side, unspecified etiology, unspecified hemiplegia type ([facility] )  G81 91        Start Time: 0935  Stop Time: 1015  Total time in clinic (min): 40 minutes    Subjective:  Pt without complaints     Objective: See treatment diary below  Focus of session on core stability, proximal strength/ endurance, distal strength and coordination, and prehension skills  Neuro re-ed: pt with G- modified tripod grasp on pencil to outline various coins on paper, with G- accuracy  Pt used raking grasp to  3 coins at a time from flat surface and place on correct space with G accuracy  Pt able to use pincer grasp to flip over coins on flat surface with min difficulty  Pt able to use pincer grasp to  coins from flat surface and stack in size order, 4 per stack, with increased time and min difficulty  Pt able to  small stacks of coins and transfer one coin at a time from palm to fingertips to then place on target with min difficulty  Pt completed core stability exercises in developmental position of supine and quadruped, with focus on facilitating core musculature and proximal stability  Pt required min manual cues for muscle activation  Pt with improved ability to sustain quadruped during bird dog position, pt now able to hold bird dog position for 10 sec each side on 3 trials  Henry well         DATE 4/7 4/12 3/25 4/1 4/5           Neuro Re-Ed  Quadruped position with 2 limb support with min manual cues   Quadruped position with 3 and 2 limb support with min manual cues           Manual Cervical stretches                       Ther Ex        Deadbug 5 x 4 10 reps each side with blue theraband    5reps x 4 sets with blue band   Supermans        Bird Dog in Quadriped 5 x 4 sets 5 each side, 3 with 10 sec hold   5 each side   Modified V-ups 5 x 4 sets    5 reps x 2    Theraband  chest press   therabar #3 3x10 Therabar #5 3x10 Orange 10 reps x 2 in  stance   Theraband horizontal abduction         Theraband Rows    Blue 3x10 Blue 3x10 Blue 2 x 10 in stance   Band Sh ER        Band Sh IR         Band Sh flex   Black 3x10 Black 3x10 10 x 2; orange band    Band Bicep curls    therabar #3 3x10 therabar #5 3x10    Theraband Tricep extension     10 reps x 2 blue band   Squat Chops    UBE 5/52  5level 10m UBE 5/5 2 5 level 10 reps x 2 purple band   Anti-rotation core with band         Ther Activity        Clips w  fx'l reach Yellow resistive clothespins to  flat small items and relocate onto targets, mod difficulty maintaining tripod grasp on clothespin  Black 3 sets Black 3 sets Small clothespins                   Cognition                               Assessment: pt ricky session well, able to engage in entire session without rest breaks  Pt with noted improved core stability during mat exercises  Plan: Continue per plan of care        Precautions: Depression, Falls  Re-eval Date: 3/30/2022    Date 4/5 4/7 4/12     Visit Count 9 10 11     FOTO        Pain In 0 0 0     Pain Out 0 0 0

## 2022-04-13 NOTE — PROGRESS NOTES
Daily Speech Treatment Note    Today's date: 2022  Patients name: Jaylyn Sesay  : 1965  MRN: [id number]  Safety measures:   Referring provider: Margarita Hernandez MD    Encounter Diagnosis     ICD-10-CM    1  Dysarthria  R47 1    2  Cerebrovascular accident (CVA), unspecified mechanism ([facility] )  I63 9          Visit Trackin    Subjective/Behavioral:  - Patient arrived 10 minutes late to therapy today  Objective/Assessment:  Completed structured activities with patient to target goals below  Results as stated below  Short-term goals:  1  Patient will utilize over-articulation, loudness & slow rate 80% of the time while orally reading multisyllabic words - 9-10 word length sentences to facilitate increased speech intelligibility with independence  3/17: 80% Utilizing strategies in reading multisyllabic words, sentences  Read script at [facility] with overall 80% use of strategies to increase clarity  Vocal adduction exercises completed with overall 12-13 seconds duration with 67-73 Db loudness  3/21: Multisyllabic words 3-5 words: Overall, 80% 3 syllable words, 70% 4-5 syllable words with moderate cues/modeling  Sentences required minimal-moderate cues to overarticulate  3/25: Decreased loudness in speaking at word - conversation level today, but improved articulation  Needed moderate -maximum reminders to utilize loudness  Practiced loudness /ah/ exercises with duration of initially 6 increased to 12 seconds, intensity of 67-73 dB  Utilized pitch exercises  3/29: Utilized "level 6" /ah/ exercises 68-75 dB, range of 7-17 seconds with encouragement & initial modeling, loud/high pitch: 100-180 Hz, pitch exercises with loudness noted 66-73 dB  Read functional phrases with self correction, minimal cues with strategies to increase clarity- 70% accuracy  : LSVT: The following data was collected using Voice [employer] on Iphone  50 cm distance from microphone    Daily Task #1   LOUD /a/: avg time 14 91 sec, avg dB SPL 71 3 dB, Perceived level of effort: 5-6/10, Cues for Loudness: min-mod  Daily Task #2: Maximum fundamental frequency range measured in Hz, Average Pitch  (HI): 276 17 Hz (78 dB),  Average Pitch (LO): 78 17 Hz (49-57 dB), Perceived level of effort: 5/10, Cues for Loudness: Mod-Max (low pitch only),  Cues for Pitch: Mod-Max, Daily Task #3: Maximum speech loudness drill of functional phrases, Average dB SPL: 75 dB, Perceived level of effort: 5/10, Cues for Loudness: mod-max, Strategies for speech: tapping table to slow rate of speech - patient demonstrated improved ability to slow rate of speech and improve intelligibility utilizing this technique        2   Patient will complete structured closed ended and open ended conversational tasks with the consistent use of compensatory strategies >80% of the time with fading minimal cues to facilitate increased speech intelligibility  3/17:  Sequencing cooking items (lasangna, cheesecake), min cues to promote overarticulation and loudness  3/25: Initially moderate cues to use loudness and slow speech in tasks and conversation, decreased to minimal cues during the session  Patient notes he "can do it " Patient utilized clear, slow speech in open conversational restaurant role playing with minimal cues  3/29: Answering questions, spontaneous simple sentences & conversation: Moderate reminders/cues to use slow speech and overarticulate for increased clarity  4/13: Speak Out! Protocol to improve loudness, speech intelligibility and incorporate cognitive language task while speaking  1  May-Me-My-Vance-Moo: Average Loudness Level: 72 dB - good clarity of speech and rate of speech, 2  Sustained "ah"s: Average Loudness Level: 74 dB - demonstrated some strain - provided moderate cueing, 3  Pitch Glides: Average Loudness Level: 79-80 dB, 4   Counting 1-12: Average Loudness Level: 74 dB good clarity of speech and rate of speech,   5  Reading Exercise (short sentences): Average Loudness Level: 70 dB - read through a second time to increase loudness level - Average Loudness Level: 71 dB - read through a 3rd time with new cueing - Average Loudness Level: 75 dB - demonstrating improved loudness and continued with good clarity and rate of speech , 6  Cognitive Exercise: Average Loudness Level: 72 dB, 7  Reading with Expression (short phrases/sentences): Average Loudness Level: 72 dB - spoken with clarity and spoken with increased expression from previous exercises       Long Term Goals:     1  Patient will independently utilize speech intelligibility strategies (e g , over-exaggeration, slow rate, breath groups, etc ) during oral reading tasks >90% intelligibility to facilitate increased generalization of skills into conversational speech by discharge       2  Patient will improve the ability to facilitate functional speech production skills for intelligible communication including compensatory strategies to promote quality of life and to maximize level of independence with communication  Plan:  -Continue with current plan of care  Loudness exercises, focus on articulation, taking time to process expressive output in conversational tasks

## 2022-04-14 ENCOUNTER — OFFICE VISIT (OUTPATIENT)
Dept: OCCUPATIONAL THERAPY | Facility: CLINIC | Age: 57
End: 2022-04-14
Payer: MEDICARE

## 2022-04-14 ENCOUNTER — OFFICE VISIT (OUTPATIENT)
Dept: SPEECH THERAPY | Facility: CLINIC | Age: 57
End: 2022-04-14
Payer: MEDICARE

## 2022-04-14 ENCOUNTER — OFFICE VISIT (OUTPATIENT)
Dept: PHYSICAL THERAPY | Facility: CLINIC | Age: 57
End: 2022-04-14
Payer: MEDICARE

## 2022-04-14 DIAGNOSIS — I63.9 CEREBROVASCULAR ACCIDENT (CVA), UNSPECIFIED MECHANISM (HCC): ICD-10-CM

## 2022-04-14 DIAGNOSIS — R26.89 BALANCE DISORDER: ICD-10-CM

## 2022-04-14 DIAGNOSIS — R47.1 DYSARTHRIA: Primary | ICD-10-CM

## 2022-04-14 DIAGNOSIS — R53.83 DECREASED STAMINA: ICD-10-CM

## 2022-04-14 DIAGNOSIS — R53.1 RIGHT SIDED WEAKNESS: Primary | ICD-10-CM

## 2022-04-14 DIAGNOSIS — G81.91 HEMIPLEGIA AFFECTING RIGHT DOMINANT SIDE, UNSPECIFIED ETIOLOGY, UNSPECIFIED HEMIPLEGIA TYPE (HCC): Primary | ICD-10-CM

## 2022-04-14 PROCEDURE — 92507 TX SP LANG VOICE COMM INDIV: CPT

## 2022-04-14 PROCEDURE — 97112 NEUROMUSCULAR REEDUCATION: CPT

## 2022-04-14 PROCEDURE — 97110 THERAPEUTIC EXERCISES: CPT

## 2022-04-14 NOTE — PROGRESS NOTES
PT Discharge    Today's date: 2022  Patient name: Lucius Harman  : 1965  MRN: [id number]  Referring provider: Perlita Love MD  Dx:   Encounter Diagnosis     ICD-10-CM    1  Right sided weakness  R53 1    2  Balance disorder  R26 89    3  Decreased stamina  R53 83    4  Cerebrovascular accident (CVA), unspecified mechanism ([facility] [address] )  I63 9        Start Time: 1016  Stop Time: 1100  Total time in clinic (min): 44 minutes    Assessment  Assessment details: Patient is a 65y o  year old male presenting to OPPT s/p diagnosis of CVA  Pt has continued to make steady progress in regards to his walking, LE strength, and balance  He has completed the 6 minute walk test which he was previously unable to do secondary to his endurance  Per the [place] he is above the cutoff for fall risk  His gait speed remains around the same pace as previous re-assessment  Reduced overall circumduction and improved foot clearance, still demonstrates R trendelenburg  Given final HEP and walking program for follow through  He has nearly met all his goals at this point  Patient and PT mutually agree to discharge from skilled PT  Goals  In 4 weeks, patient will:  1  Pt will achieve MDC value, 2 9s, on TUG using least restrictive device to demonstrate reduced fall risk - met  2  Improve 5xSTS by at least 2 seconds to demonstrate improved lower extremity strength and support - met  3  Demonstrate increased strength of R lower extremity by at least 1/2 grade to allow for improved transfer quality and stability - not met  4  Demonstrate ability to perform 20 minutes of activity without requiring seated rest break  - met    In 4-10 weeks, patient will:  1  Meet gait speed of 0 70 m/s with least restrictive device to meet MDC value and reduce risk of falls  - met  2  Improve Lin balance score by at least 5 points to meet MDC value and show improved balance with ADLs    3   Report independence with walking program and consistent carryover with HEP  - met  4  Improve 2MWT by at least 40ft with least restrictive device to show improved endurance to complete ADLs  - met  5  Improve FOTO score to predicted value to demonstrate improved functional mobility  - met      In 4 weeks  1  Improve gait speed by at least 0 08 m/s to improve safety crossing a crosswalk  - not met  2  Improve Lin balance score to above cutoff of 45 to show improved balance and reduced fall risk  - met  3  Complete 6MWT to show improved walking endurance  - met  4  Complete FGA and improve score by at least 3 points to show improved dynamic balance and reduce fall risk - not met        Subjective Evaluation    History of Present Illness  Mechanism of injury: Update 4/14: Pt denies falls  Feels like when his mood is better he does better physically  Has been more active recently  Hip and knee are not bothering him as much now  Update 2/16: Pt denies falls  Overall feels like things are moving in the right direction  Has received new AFO from   Wears the brace at home a lot  His back has been bothering him more recently  Present at PT: Pt reports to oupt with his significant other eulalia  Reports that he was at inpatient rehab for 2 weeks  Had all disciplines there as well as with home health  Was dc'd last Friday  Using a RW to mobilize around the house  Notes his depression has been getting in the way of being more active  Feels like he just wants to lay around a lot  Did get afo while in rehab (on dc day)  Has not been wearing it as it is uncomfortable  Using shower chair  Reacher to put shoes on  Has difficulty balancing to get dressed  Needs to sit frequently while doing ADLs    Pain  No pain reported    Social Support  Steps to enter house: yes  3  Stairs in house: yes   12  Lives with: spouse    Employment status: not working (disability)  Treatments  Previous treatment: physical therapy, speech therapy and occupational therapy  Current treatment: speech therapy  Discharged from (in last 30 days): home health care  Patient Goals  Patient goals for therapy: increased strength, improved balance and independence with ADLs/IADLs  Patient goal: "Maybe walk without the walker"        Objective     Strength/Myotome Testing     Left Hip   Planes of Motion   Flexion: 4+  Extension: 4+  Abduction: 4+    Right Hip   Planes of Motion   Flexion: 4-  Extension: 4-  Abduction: 4-    Left Knee   Extension: 4+    Right Knee   Extension: 4-    Left Ankle/Foot   Dorsiflexion: 4+  Plantar flexion: 4+    Right Ankle/Foot   Dorsiflexion: 3  Plantar flexion: 3  Neuro Exam:     Functional outcomes   6 minute walk test: 500 ft 1 rest break SPC  Gait speed: 0 81 m/s  5x sit to stand: 06 69 more L weight shift (seconds)  2 minute walk test: 320 ft w SPC  TU 01 SPC, AFO (seconds)  Functional outcome comment: See Milla Raza in flowsheet  Functional outcome gait comment: Pt ambulates with SPC  Improved R circumduction, still note R trendelenburg  Min episodes of reduced toe clearance  Flowsheet Rows      Most Recent Value   PT/OT G-Codes    Current Score 74   Projected Score 54             Precautions: Depression, Falls  Re-eval Date: 2022    Date        Visit Count 26       FOTO        Pain In        Pain Out                Testing        TUG 08 01 S PC       5xSTS 06 69 s       Gait speed 0 81 m/s SPC       Lin 47/56       2/6MWT 6MWT 500ft       Manuals         Neuro Re-Ed        Dynamic balance Lat stepping 15ft x4       Static balance Lin       Obstacle course        Gait in solo step with focus on increased stride        Color cone taps         High intensity gait Solo step 2lb aw 1min 10s 1  8mph TM       // bars 1UE step tap AMRAP - stop based on form       Ther Ex 6MWT       SLR x 4        HR/TR        Sit to stand        Step ups        Bridges        Abductor str        Leg press                Ther Activity        ADL                Gait Training Divided Attention        Head turns        Modalities         prn

## 2022-04-14 NOTE — PROGRESS NOTES
Daily Note     Today's date: 2022  Patient name: Shi Urena  : 1965  MRN: [id number]  Referring provider: Griselda Durand MD  Dx:   Encounter Diagnosis     ICD-10-CM    1  Hemiplegia affecting right dominant side, unspecified etiology, unspecified hemiplegia type ([facility] [address] )  G81 91        Start Time: 0930  Stop Time: 1015  Total time in clinic (min): 45 minutes    Subjective:  Pt without complaints     Objective: See treatment diary below  Focus of session on core stability, proximal strength/ endurance, distal strength and coordination, and prehension skills  Neuro re-ed: Pt completed core stability exercises in developmental position of supine and quadruped, with focus on facilitating core musculature and proximal stability  Pt required min manual cues for muscle activation and for stabilization  Pt with improved ability to sustain quadruped during bird dog position, pt now able to hold bird dog position for 15 sec each side on 5 trials  Henry well  [address]: pt able to use scissors to cut along lines with G+ accuracy, minimal to no difficulty  Pt able to use pincer grasp on R hand to  small pieces of paper off tabletop and then crumple in hand  Pt with mod to max difficulty bringing papers to fingertips to crumple  Pt required increased time to complete task         DATE            Neuro Re-Ed  Quadruped position with 2 limb support with min manual cues Quadruped position with 2 limb support with min manual cues  Quadruped position with 3 and 2 limb support with min manual cues           Manual Cervical stretches                       Ther Ex        Deadbug 5 x 4 10 reps each side with blue theraband  6 reps x 2 each side with blue theraband   5reps x 4 sets with blue band   Supermans        Bird Dog in Quadriped 5 x 4 sets 5 each side, 3 with 10 sec hold 5 each side, 15 sec hold  5 each side   Modified V-ups 5 x 4 sets  6 reps x 2 sets  5 reps x 2    Theraband  chest press     Blue, 10 reps x 2 Therabar #5 3x10 Orange 10 reps x 2 in  stance   Theraband horizontal abduction     Blue, 10 reps x 2     Theraband Rows    Blue  10 reps x 2 Blue 3x10 Blue 2 x 10 in stance   Band Sh ER        Band Sh IR         Band Sh flex    Blue, 10 reps x 2 Black 3x10 10 x 2; orange band    Band Bicep curls     therabar #5 3x10    Theraband Tricep extension    Blue, 10 reps x 2  10 reps x 2 blue band   Squat Chops     UBE 5/5 2 5 level 10 reps x 2 purple band   Anti-rotation core with band         Ther Activity        Clips w  fx'l reach Yellow resistive clothespins to  flat small items and relocate onto targets, mod difficulty maintaining tripod grasp on clothespin  Blue resistive clothespin to  small items and relocate onto target, G precision with placement onto target Black 3 sets Small clothespins                   Cognition                               Assessment: pt ricky session well, able to engage in entire session without rest breaks  Pt with noted improved core stability during mat exercises  Plan: Continue per plan of care        Precautions: Depression, Falls  Re-eval Date: 3/30/2022    Date 4/5 4/7 4/12 4/14    Visit Count 9 10 11 12    FOTO        Pain In 0 0 0     Pain Out 0 0 0

## 2022-04-19 ENCOUNTER — APPOINTMENT (OUTPATIENT)
Dept: PHYSICAL THERAPY | Facility: CLINIC | Age: 57
End: 2022-04-19
Payer: MEDICARE

## 2022-04-21 ENCOUNTER — APPOINTMENT (OUTPATIENT)
Dept: PHYSICAL THERAPY | Facility: CLINIC | Age: 57
End: 2022-04-21
Payer: MEDICARE

## 2022-04-26 ENCOUNTER — OFFICE VISIT (OUTPATIENT)
Dept: OCCUPATIONAL THERAPY | Facility: CLINIC | Age: 57
End: 2022-04-26
Payer: MEDICARE

## 2022-04-26 ENCOUNTER — APPOINTMENT (OUTPATIENT)
Dept: PHYSICAL THERAPY | Facility: CLINIC | Age: 57
End: 2022-04-26
Payer: MEDICARE

## 2022-04-26 DIAGNOSIS — G81.91 HEMIPLEGIA AFFECTING RIGHT DOMINANT SIDE, UNSPECIFIED ETIOLOGY, UNSPECIFIED HEMIPLEGIA TYPE (HCC): Primary | ICD-10-CM

## 2022-04-26 PROCEDURE — 97168 OT RE-EVAL EST PLAN CARE: CPT

## 2022-04-26 NOTE — PROGRESS NOTES
OCCUPATIONAL THERAPY RE-EVALUATION     4/26/2022  Izzy Yemi  1965  [id number]  Snow Ritchie MD CVA    Assessment/Plan    Skilled Analysis:  Pt is a 65 y o  male referred to Occupational Therapy s/p CVA  Pt participated in skilled OT re-evaluation  Pt has been seen in skilled OT for 12 visits, with missed visits last week due to knee pain, which is now improved  Since the evaluation, pt has improved BUE strength, with LUE improving to 5/5 and RUE improving to 4+-5/5  Pt continues to present with [address] deficits, with a noted decline in [address] on testing of 9 hole peg and keyhole peg test  HEP for UE strength has been initiated  Pt has been actively participating in skilled OT sessions for 45 min with minimal rest breaks, ricky well  Pt continues to present with the following areas of deficit: right side weakness, impaired GMC/FMC, and impaired dynamic standing balance, all limiting ability to complete basic ADLs of dressing, as well as engagement in IADLs of lawn care and yardwork  Pt also limited in engagement in leisure activities  Pt will benefit from continued skilled Occupational Therapy services 2x/week for 8 weeks with focus on improving motor control, improving functional use of RUE and assist with return to driving        Short Term Goals (to be achieved within 4 weeks):  - Complete FMC testing- MET  - Pt will be able to complete LBD of donning shoes by self, with min increase time and a/e as needed- NOT MET   - Pt will be able to don R MAFO independently- NOT MET   - Pt will demo G carryover of Home Exercise Program to improve functional progression towards goals in Plan of care and for improved functional use of RUE in all daily tasks- PARTIALLY MET   - Pt will demo with G tolerance to supine, seated, and in stance exercise x 45 minutes with minimal rest breaks required for increased engagement in weekly exercise regimen and increased engagement in life roles- MET   - Pt will increase R UE strength to 5/5, through the use of strengthening exercises and home program for engagement in all daily and leisure activities- NOT MET  - Pt will engage in 1-2 leisure tasks per week to improve carryover of skills practiced in the clinic, as well as to assist with return to engagement in leisure activities- [address] [place] [address] 4/26           Long Term Goals (8 weeks):  1  Pt will improve RUE rate of manipulation for all FM tests for improved functional performance/independence with daily and leisure tasks x 25%- NOT MET   2  Complete assessments to determine eligibility to return to driving; identify any modifications needed for return to driving- NOT MET   3  Pt will be I with carryover of HEP to maintain strength and functional use of RUE- NOT MET       Subjective    SUBJECTIVE: pt reporting he had pain in his L knee last week, which is why he missed therapy but is reporting the pain is better now  PATIENT GOAL: to be able to put his pants, shoes and socks on by himself, to be able to mow his lawn again, to use his right hand to work on house projects/ brandon on small motors, to return to driving  HISTORY OF PRESENT ILLNESS:     Pt is a 65 y o  male who was referred to Occupational Therapy following CVA  Pt admitted to the hospital in Dec 2021 with right side weakness and aphasia  MRI (+) acute stroke in right internal capsule and right pontine areas  Pt went to short term rehab prior to dc home with NATALY DUCKWORTH , who provides support with daily tasks         PMH:   Past Medical History:   Diagnosis Date    Allergic rhinitis     last assessed 5/15/12; resolved 9/17/14    Anxiety     Bell's palsy     last assessed 10/2/15; resolved 10/2/15    Depression     DJD (degenerative joint disease)     Fracture lumbar vertebra-closed ([facility] [address] )     last assessed 12/30/14; resolved 12/30/14    Gout     Hypertension     Knee osteoarthritis     Malignant renovascular hypertension          Pain Levels:  No reports of pain prior to and during eval      Objective    Impairments:     AROM:  LUE: WNL into all planes  RUE: WNL into all planes    MMT LUE on 3/3/22: Sh flex: 4/5  Sh Abd: 4+/5  Sh IR: 4+/5  Sh ER: 4+/5   Elbow flex: 5/5  Elbow Ext: 4+/5   Wrist Flex: 5/5  Wrist Ext: 5/5  Finger Flex: 5/5   Finger Ext: 5/5     LUE on 4/26/22: 5/5 throughout LUE            DATE:  3/3 4/26      Affected UE RUE  RUE              Sh flex 4-/5 4/5      Sh Abd 4+/5 4+/5      Sh IR 4+/5 4+/5      Sh ER 4+/5 4+/5      Elbow Flex 5/5 5/5      Elbow Ext 4+/5 5/5      Wrist Flex 5/5 5/5      Wrist Ext 5/5 5/5      Finger Flex 4+/5 5/5      Finger Ext 4+/5 5/5                              DATE 3/8 4/26              Testing:        Dynamometer L: 74  R: 70 L: 66  R: 55      Pinchmeter: pincer L: 15  R: 14 L: 16  R: 11      Pinchmeter: 3 jaw akira L: 19  R: 21 L:20  R: 21      Pinchmeter: lateral  L: 25  R: 24 L: 20  R: 23              9 hole Peg L: 25 86 sec  R: 29 69 sec  L: 28 sec  R: 54 sec              Keyhole Peg L: 1 min 26 sec  R: 1 min 41 sec L: 1 min 57 sec  R: 2 min 31 sec                                   Sensation:   Light Touch: Intact  Hot/cold: Intact     Functional cognition:   Grossly intact with attention, concentration, STM, LTM    Vision:   ROM: Intact  Tracking: Intact  Saccades: Intact   Visual Fields: Intact     ACTIVITIES OF DAILY LIVING:  Bathing: I  UBD: I  LBD: requires A to don B shoes and socks, A to thread RLE into pants, A to don R MAFO              OTHER PLANNED THERAPY INTERVENTIONS:   Supine, seated, and in stance neuro re-ed  FMC/prehension  Timed Trials  Manual tx  Hand to target  Sensory re-ed  Seated functional reach: crossing midline  Supine place and hold  WBearing strategies   Closed chain activities  Open chain activities      INTERVENTION COMMENTS:  Diagnosis: CVA  Precautions:  FOTO:  Insurance: Payor: Pedro Alicea / Plan: MEDICARE A AND B / Product Type: Medicare A & B Fee for Service /   Visit: 13  PN due 5/24/22

## 2022-04-28 ENCOUNTER — HOSPITAL ENCOUNTER (OUTPATIENT)
Dept: SLEEP CENTER | Facility: CLINIC | Age: 57
Discharge: HOME | End: 2022-04-28
Payer: MEDICARE

## 2022-04-28 DIAGNOSIS — F51.19 OTHER HYPERSOMNIA NOT DUE TO A SUBSTANCE OR KNOWN PHYSIOLOGICAL CONDITION: ICD-10-CM

## 2022-04-28 DIAGNOSIS — R06.81 WITNESSED EPISODE OF APNEA: ICD-10-CM

## 2022-04-28 PROCEDURE — G0399 HOME SLEEP TEST/TYPE 3 PORTA: HCPCS

## 2022-04-29 NOTE — PROGRESS NOTES
Home Sleep Study Documentation    Pre-Sleep Home Study:    Set-up and instructions performed by: CANDI    Technician performed demonstration for Patient: yes    Return demonstration performed by Patient: yes    Written instructions provided to Patient: yes    Patient signed consent form: yes        Post-Sleep Home Study:    Additional comments by Patient: When he woke the one time he didn't know where he was      Home Sleep Study Failed:no:    Failure reason: N/A    Reported or Detected: N/A    Scored by: ALEXANDER Martin

## 2022-05-02 ENCOUNTER — OFFICE VISIT (OUTPATIENT)
Dept: FAMILY MEDICINE CLINIC | Facility: HOSPITAL | Age: 57
End: 2022-05-02
Payer: MEDICARE

## 2022-05-02 VITALS
HEIGHT: 63 IN | BODY MASS INDEX: 34.59 KG/M2 | HEART RATE: 102 BPM | TEMPERATURE: 97.5 F | DIASTOLIC BLOOD PRESSURE: 82 MMHG | SYSTOLIC BLOOD PRESSURE: 130 MMHG | WEIGHT: 195.2 LBS

## 2022-05-02 DIAGNOSIS — I10 ESSENTIAL HYPERTENSION: ICD-10-CM

## 2022-05-02 DIAGNOSIS — M1A.00X0 IDIOPATHIC CHRONIC GOUT WITHOUT TOPHUS, UNSPECIFIED SITE: ICD-10-CM

## 2022-05-02 DIAGNOSIS — Z11.4 SCREENING FOR HIV (HUMAN IMMUNODEFICIENCY VIRUS): ICD-10-CM

## 2022-05-02 DIAGNOSIS — I63.9 ACUTE ISCHEMIC STROKE (HCC): ICD-10-CM

## 2022-05-02 DIAGNOSIS — I63.9 ACUTE CVA (CEREBROVASCULAR ACCIDENT) (HCC): ICD-10-CM

## 2022-05-02 DIAGNOSIS — Z00.00 MEDICARE ANNUAL WELLNESS VISIT, INITIAL: Primary | ICD-10-CM

## 2022-05-02 DIAGNOSIS — G81.91 HEMIPLEGIA AFFECTING RIGHT DOMINANT SIDE, UNSPECIFIED ETIOLOGY, UNSPECIFIED HEMIPLEGIA TYPE (HCC): ICD-10-CM

## 2022-05-02 DIAGNOSIS — Z11.59 NEED FOR HEPATITIS C SCREENING TEST: ICD-10-CM

## 2022-05-02 DIAGNOSIS — M47.896 OTHER OSTEOARTHRITIS OF SPINE, LUMBAR REGION: ICD-10-CM

## 2022-05-02 PROCEDURE — 99214 OFFICE O/P EST MOD 30 MIN: CPT | Performed by: FAMILY MEDICINE

## 2022-05-02 PROCEDURE — 95806 SLEEP STUDY UNATT&RESP EFFT: CPT | Performed by: INTERNAL MEDICINE

## 2022-05-02 PROCEDURE — G0438 PPPS, INITIAL VISIT: HCPCS | Performed by: FAMILY MEDICINE

## 2022-05-02 RX ORDER — ALLOPURINOL 100 MG/1
100 TABLET ORAL DAILY
Qty: 90 TABLET | Refills: 1 | Status: SHIPPED | OUTPATIENT
Start: 2022-05-02 | End: 2022-08-01 | Stop reason: SDUPTHER

## 2022-05-02 RX ORDER — LISINOPRIL 40 MG/1
40 TABLET ORAL DAILY
Qty: 90 TABLET | Refills: 1 | Status: SHIPPED | OUTPATIENT
Start: 2022-05-02 | End: 2022-08-01 | Stop reason: SDUPTHER

## 2022-05-02 RX ORDER — PRAVASTATIN SODIUM 10 MG
10 TABLET ORAL
Qty: 90 TABLET | Refills: 1 | Status: SHIPPED | OUTPATIENT
Start: 2022-05-02 | End: 2022-08-01 | Stop reason: SDUPTHER

## 2022-05-02 RX ORDER — DULOXETIN HYDROCHLORIDE 60 MG/1
60 CAPSULE, DELAYED RELEASE ORAL DAILY
Qty: 90 CAPSULE | Refills: 1 | Status: SHIPPED | OUTPATIENT
Start: 2022-05-02 | End: 2022-08-01 | Stop reason: SDUPTHER

## 2022-05-02 RX ORDER — METOPROLOL TARTRATE 50 MG/1
50 TABLET, FILM COATED ORAL EVERY 12 HOURS
COMMUNITY
Start: 2022-03-24 | End: 2022-05-02 | Stop reason: SDUPTHER

## 2022-05-02 RX ORDER — AMLODIPINE BESYLATE 10 MG/1
10 TABLET ORAL DAILY
Qty: 90 TABLET | Refills: 1 | Status: SHIPPED | OUTPATIENT
Start: 2022-05-02 | End: 2022-08-01 | Stop reason: SDUPTHER

## 2022-05-02 RX ORDER — HYDRALAZINE HYDROCHLORIDE 25 MG/1
25 TABLET, FILM COATED ORAL EVERY 12 HOURS
Qty: 180 TABLET | Refills: 1 | Status: SHIPPED | OUTPATIENT
Start: 2022-05-02 | End: 2022-08-01

## 2022-05-02 RX ORDER — METOPROLOL TARTRATE 50 MG/1
50 TABLET, FILM COATED ORAL EVERY 12 HOURS
Qty: 90 TABLET | Refills: 1 | Status: SHIPPED | OUTPATIENT
Start: 2022-05-02 | End: 2022-08-01

## 2022-05-02 NOTE — PROGRESS NOTES
Assessment and Plan:     Problem List Items Addressed This Visit        Cardiovascular and Mediastinum    Acute ischemic stroke [facility])    Relevant Orders    Ambulatory Referral to Physiatry    Ambulatory Referral to Occupational Therapy    Essential hypertension    Relevant Medications    lisinopril (ZESTRIL) 40 mg tablet    metoprolol tartrate (LOPRESSOR) 50 mg tablet    hydrALAZINE (APRESOLINE) 25 mg tablet    amLODIPine (NORVASC) 10 mg tablet    Other Relevant Orders    CBC    Comprehensive metabolic panel    Lipid Panel with Direct LDL reflex    TSH, 3rd generation with Free T4 reflex       Nervous and Auditory    Hemiplegia affecting right dominant side, unspecified etiology, unspecified hemiplegia type ([facility] )    Relevant Orders    Ambulatory Referral to Physiatry    Ambulatory Referral to Occupational Therapy       Musculoskeletal and Integument    Degenerative joint disease (DJD) of lumbar spine    Relevant Medications    DULoxetine (CYMBALTA) 60 mg delayed release capsule       Other    Idiopathic chronic gout    Relevant Medications    allopurinol (ZYLOPRIM) 100 mg tablet      Other Visit Diagnoses     Medicare annual wellness visit, initial    -  Primary    Acute CVA (cerebrovascular accident) ([facility] )        Relevant Medications    pravastatin (PRAVACHOL) 10 mg tablet    Screening for HIV (human immunodeficiency virus)        Relevant Orders    HIV 1/2 Antigen/Antibody (4th Generation) w Reflex [facility]    Need for hepatitis C screening test        Relevant Orders    Hepatitis C antibody           Preventive health issues were discussed with patient, and age appropriate screening tests were ordered as noted in patient's After Visit Summary  Personalized health advice and appropriate referrals for health education or preventive services given if needed, as noted in patient's After Visit Summary       History of Present Illness:     Patient presents for Medicare Annual Wellness visit    Patient Care Team:  [facility] MD Dannielle as PCP - General  Jose Maria Xie MD as PCP - [address] [place] [address],[address] [facility]  DO Alisson Guadalupe MD Nanda Bores, MD Mirna Barba, MD     Problem List:     Patient Active Problem List   Diagnosis    Chronic back pain    Degenerative joint disease (DJD) of lumbar spine    Recurrent major depressive disorder, in full remission ([address] )    Essential hypertension    Generalized osteoarthritis    Idiopathic chronic gout    Hyperglycemia    Hyperlipidemia    Injury of wrist, left    Knee osteoarthritis    Lumbar radiculopathy    Osteoarthritis of left wrist    Polyarthralgia    Primary localized osteoarthritis of right knee    Rotator cuff disorder    Sacral pain    Spondylolisthesis of lumbar region    Tear of infraspinatus tendon, right, sequela    Chronic pain of both shoulders    Other male erectile dysfunction    Acute ischemic stroke ([facility] )    Hemiplegia affecting right dominant side, unspecified etiology, unspecified hemiplegia type ([address] )    SHOSHANA (obstructive sleep apnea)    Witnessed episode of apnea      Past Medical and Surgical History:     Past Medical History:   Diagnosis Date    Allergic rhinitis     last assessed 5/15/12; resolved 9/17/14    Anxiety     Bell's palsy     last assessed 10/2/15; resolved 10/2/15    Depression     DJD (degenerative joint disease)     Fracture lumbar vertebra-closed ([address] )     last assessed 12/30/14; resolved 12/30/14    Gout     Hypertension     Knee osteoarthritis     Malignant renovascular hypertension      Past Surgical History:   Procedure Laterality Date    CARDIAC ELECTROPHYSIOLOGY PROCEDURE N/A 1/29/2022    Procedure: Cardiac loop recorder implant;  Surgeon: Jb Jacobsen MD;  [facility];   Service: Cardiology    KNEE ARTHROSCOPY Bilateral     SHOULDER SURGERY  12/2009    arthroscopy      Family History:     Family History   Problem Relation Age of Onset    Hypertension Father       Social History:     Social History     Socioeconomic History    Marital status:      Spouse name: None    Number of children: None    Years of education: None    Highest education level: None   Occupational History    None   Tobacco Use    Smoking status: Never Smoker    Smokeless tobacco: Never Used   Substance and Sexual Activity    Alcohol use: Not Currently     Comment: social    Drug use: No    Sexual activity: Not Currently   Other Topics Concern    None   Social History Narrative    None     Social Determinants of Health     Financial Resource Strain: Not on file   Food Insecurity: Not on file   Transportation Needs: Not on file   Physical Activity: Not on file   Stress: Not on file   Social Connections: Not on file   Intimate Partner Violence: Not on file   Housing Stability: Not on file      Medications and Allergies:     Current Outpatient Medications   Medication Sig Dispense Refill    allopurinol (ZYLOPRIM) 100 mg tablet Take 1 tablet (100 mg total) by mouth daily 90 tablet 1    amLODIPine (NORVASC) 10 mg tablet Take 1 tablet (10 mg total) by mouth daily 90 tablet 1    aspirin 81 mg chewable tablet Chew 1 tablet (81 mg total) daily  0    DULoxetine (CYMBALTA) 60 mg delayed release capsule Take 1 capsule (60 mg total) by mouth daily 90 capsule 1    hydrALAZINE (APRESOLINE) 25 mg tablet Take 1 tablet (25 mg total) by mouth every 12 (twelve) hours 180 tablet 1    lisinopril (ZESTRIL) 40 mg tablet Take 1 tablet (40 mg total) by mouth daily 90 tablet 1    metoprolol tartrate (LOPRESSOR) 50 mg tablet Take 1 tablet (50 mg total) by mouth every 12 (twelve) hours 90 tablet 1    pravastatin (PRAVACHOL) 10 mg tablet Take 1 tablet (10 mg total) by mouth daily with dinner 90 tablet 1     No current facility-administered medications for this visit       Allergies   Allergen Reactions    Lipitor [Atorvastatin] Other (See Comments) and Myalgia     myalgia    Hctz [Hydrochlorothiazide] Rash and Hives      Immunizations:     Immunization History   Administered Date(s) Administered    INFLUENZA 12/24/2021    Td (adult), adsorbed 01/01/2001    Tdap 02/01/2018      Health Maintenance:         Topic Date Due    Hepatitis C Screening  Never done    HIV Screening  Never done    Colorectal Cancer Screening  Never done         Topic Date Due    COVID-19 Vaccine (1) Never done      Medicare Health Risk Assessment:     /82   Pulse 102   Temp 97 5 °F (36 4 °C)   Ht 5' 3" (1 6 m)   Wt 88 5 kg (195 lb 3 2 oz)   BMI 34 58 kg/m²      Armin Rodríguez is here for his Initial Wellness visit  Health Risk Assessment:   Patient rates overall health as fair  Patient feels that their physical health rating is much worse  Patient is satisfied with their life  Eyesight was rated as same  Hearing was rated as same  Patient feels that their emotional and mental health rating is slightly better  Patients states they are never, rarely angry  Patient states they are often unusually tired/fatigued  Pain experienced in the last 7 days has been none  Patient states that he has experienced no weight loss or gain in last 6 months  Depression Screening:   PHQ-9 Score: 22      Fall Risk Screening: In the past year, patient has experienced: history of falling in past year    Number of falls: 2 or more  Injured during fall?: No      Home Safety:  Patient does not have trouble with stairs inside or outside of their home  Patient has working smoke alarms and has working carbon monoxide detector  Home safety hazards include: none  Nutrition:   Current diet is Regular  Medications:   Patient is not currently taking any over-the-counter supplements  Patient is able to manage medications       Activities of Daily Living (ADLs)/Instrumental Activities of Daily Living (IADLs):   Walk and transfer into and out of bed and chair?: Yes  Dress and groom yourself?: Yes    Bathe or shower yourself?: Yes Feed yourself? Yes  Do your laundry/housekeeping?: Yes  Manage your money, pay your bills and track your expenses?: Yes  Make your own meals?: Yes    Do your own shopping?: Yes    Previous Hospitalizations:   Any hospitalizations or ED visits within the last 12 months?: Yes      Advance Care Planning:   Living will: Yes    Durable POA for healthcare: Yes    Advanced directive: Yes      PREVENTIVE SCREENINGS      Cardiovascular Screening:    General: Screening Not Indicated and History Lipid Disorder      Diabetes Screening:     General: Screening Current      Colorectal Cancer Screening:     General: Risks and Benefits Discussed    Due for: Cologuard      Osteoporosis Screening:    General: Risks and Benefits Discussed      Abdominal Aortic Aneurysm (AAA) Screening:        General: Screening Not Indicated      Lung Cancer Screening:     General: Screening Not Indicated      Hepatitis C Screening:    General: Risks and Benefits Discussed    Screening, Brief Intervention, and Referral to Treatment (SBIRT)    Screening  Typical number of drinks in a day: 0  Typical number of drinks in a week: 0  Interpretation: Low risk drinking behavior      Single Item Drug Screening:  How often have you used an illegal drug (including marijuana) or a prescription medication for non-medical reasons in the past year? never    Single Item Drug Screen Score: 0  Interpretation: Negative screen for possible drug use disorder      Renata العلي MD

## 2022-05-02 NOTE — PATIENT INSTRUCTIONS

## 2022-05-03 ENCOUNTER — OFFICE VISIT (OUTPATIENT)
Dept: OCCUPATIONAL THERAPY | Facility: CLINIC | Age: 57
End: 2022-05-03
Payer: MEDICARE

## 2022-05-03 DIAGNOSIS — G81.91 HEMIPLEGIA AFFECTING RIGHT DOMINANT SIDE, UNSPECIFIED ETIOLOGY, UNSPECIFIED HEMIPLEGIA TYPE (HCC): Primary | ICD-10-CM

## 2022-05-03 PROCEDURE — 97110 THERAPEUTIC EXERCISES: CPT

## 2022-05-03 PROCEDURE — 97530 THERAPEUTIC ACTIVITIES: CPT

## 2022-05-03 NOTE — PROGRESS NOTES
Daily Note     Today's date: 5/3/2022  Patient name: Corinna Cordero  : 1965  MRN: [id number]  Referring provider: Mar Finney MD  Dx:   Encounter Diagnosis     ICD-10-CM    1  Hemiplegia affecting right dominant side, unspecified etiology, unspecified hemiplegia type ([facility] )  G81 91        Start Time: 1100  Stop Time: 1150  Total time in clinic (min): 50 minutes    Subjective:  Pt without complaints     Objective: Focus of session on distal strength and coordination, prehension skills and IADLs of developing a schedule for increasing participation in leisure activities to assist with improving mental health  [address]: Theraputty HEP initiated  Pt able to complete yellow theraputty exercises with R hand with focus on improving  and pinch strength  Pt completed each exercise with 10 reps  Will cont to practice until pt is I with HEP before issuing HEP  Ther Act: Reviewed leisure interests and identified activities that pt has not participated in since the CVA  Identified activities that he would enjoy, which will help improve mental health/ depression  Set-up schedule for next week to incorporate increasing engagement in leisure activities  Pt to fill out schedule over the next week and bring to next therapy session            DATE            Neuro Re-Ed  Quadruped position with 2 limb support with min manual cues Quadruped position with 2 limb support with min manual cues  Quadruped position with 3 and 2 limb support with min manual cues           Manual Cervical stretches                       Ther Ex        Deadbug 5 x 4 10 reps each side with blue theraband  6 reps x 2 each side with blue theraband   5reps x 4 sets with blue band   Supermans        Bird Dog in Quadriped 5 x 4 sets 5 each side, 3 with 10 sec hold 5 each side, 15 sec hold  5 each side   Modified V-ups 5 x 4 sets  6 reps x 2 sets  5 reps x 2    Theraband  chest press     Blue, 10 reps x 2 Therabar #5 3x10 Orange 10 reps x 2 in  stance   Theraband horizontal abduction     Blue, 10 reps x 2     Theraband Rows    Blue  10 reps x 2 Blue 3x10 Blue 2 x 10 in stance   Band Sh ER        Band Sh IR         Band Sh flex    Blue, 10 reps x 2 Black 3x10 10 x 2; orange band    Band Bicep curls     therabar #5 3x10    Theraband Tricep extension    Blue, 10 reps x 2  10 reps x 2 blue band   Squat Chops     UBE 5/5 2 5 level 10 reps x 2 purple band   Anti-rotation core with band         Ther Activity        Clips w  fx'l reach Yellow resistive clothespins to  flat small items and relocate onto targets, mod difficulty maintaining tripod grasp on clothespin  Blue resistive clothespin to  small items and relocate onto target, G precision with placement onto target Black 3 sets Small clothespins                   Cognition                               Assessment: pt ricky session well, G engagement in session  Would benefit from cont OT sessions  Plan: Continue per plan of care        Precautions: Depression, Falls     INTERVENTION COMMENTS:  Diagnosis: CVA  Precautions:  FOTO:  Insurance: Payor: Karine Castano / Plan: MEDICARE A AND B / Product Type: Medicare A & B Fee for Service /   Visit: 14  PN due 5/24/22

## 2022-05-04 ENCOUNTER — TELEPHONE (OUTPATIENT)
Dept: NEUROLOGY | Facility: CLINIC | Age: 57
End: 2022-05-04

## 2022-05-04 NOTE — PROGRESS NOTES
Assessment/Plan:      Problem List Items Addressed This Visit        Cardiovascular and Mediastinum    Acute ischemic stroke [facility])    Relevant Orders    Ambulatory Referral to Physiatry    Ambulatory Referral to Occupational Therapy    Essential hypertension    Relevant Medications    lisinopril (ZESTRIL) 40 mg tablet    metoprolol tartrate (LOPRESSOR) 50 mg tablet    hydrALAZINE (APRESOLINE) 25 mg tablet    amLODIPine (NORVASC) 10 mg tablet    Other Relevant Orders    CBC    Comprehensive metabolic panel    Lipid Panel with Direct LDL reflex    TSH, 3rd generation with Free T4 reflex       Nervous and Auditory    Hemiplegia affecting right dominant side, unspecified etiology, unspecified hemiplegia type [facility])    Relevant Orders    Ambulatory Referral to Physiatry    Ambulatory Referral to Occupational Therapy       Musculoskeletal and Integument    Degenerative joint disease (DJD) of lumbar spine    Relevant Medications    DULoxetine (CYMBALTA) 60 mg delayed release capsule       Other    Idiopathic chronic gout    Relevant Medications    allopurinol (ZYLOPRIM) 100 mg tablet      Other Visit Diagnoses     Medicare annual wellness visit, initial    -  Primary    Acute CVA (cerebrovascular accident) ([facility] )        Relevant Medications    pravastatin (PRAVACHOL) 10 mg tablet    Screening for HIV (human immunodeficiency virus)        Relevant Orders    HIV 1/2 Antigen/Antibody (4th Generation) w Reflex [facility]    Need for hepatitis C screening test        Relevant Orders    Hepatitis C antibody           Plan/Discussion:    S/p cva  On sttin and asa  Completing outpatient physical therapy  With right sided hemiplegia and gait impairment  Discussed this is likely going to be long term especially if he plateaud with physical therapy  Will refer to Physiatry for further evaluation and recommendation  Wanting to go back to driving  With his physical deficits I am hesitant regarding his driving safety  Referral to OT for driving evaluation which he does agree to   Fu in 6 months  Subjective:   Chief Complaint   Patient presents with    Follow-up    Medicare Wellness Visit     Initial         Patient ID: Librado Goss is a 65 y o  male  Pt here for fu  Reports doing well with physical therapy but course is ending soon  With ongoing right sided weakness and gait deficits  Asking about how to get this better  Asking about going back to driving himself  Otherwise feeling well  Taking medications regularly  The following portions of the patient's history were reviewed and updated as appropriate: allergies, current medications, past family history, past medical history, past social history, past surgical history and problem list     Review of Systems   Constitutional: Negative  Negative for activity change, appetite change, chills and diaphoresis  HENT: Negative for congestion and dental problem  Respiratory: Negative  Negative for apnea, chest tightness, shortness of breath and wheezing  Cardiovascular: Negative  Negative for chest pain, palpitations and leg swelling  Gastrointestinal: Negative  Negative for abdominal distention, abdominal pain, constipation, diarrhea and nausea  Genitourinary: Negative  Negative for difficulty urinating, dysuria and frequency  Objective:  Vitals:    05/02/22 1137   BP: 130/82   Pulse: 102   Temp: 97 5 °F (36 4 °C)   Weight: 88 5 kg (195 lb 3 2 oz)   Height: 5' 3" (1 6 m)     BP Readings from Last 6 Encounters:   05/02/22 130/82   02/02/22 138/80   01/29/22 121/77   01/05/22 122/70   12/13/21 (!) 176/97   11/07/19 142/100      Wt Readings from Last 6 Encounters:   05/02/22 88 5 kg (195 lb 3 2 oz)   01/05/22 78 7 kg (173 lb 6 4 oz)   12/09/21 77 1 kg (170 lb)   12/08/21 77 1 kg (170 lb)   11/07/19 77 1 kg (170 lb)   11/29/18 75 7 kg (166 lb 12 8 oz)             Physical Exam  Vitals and nursing note reviewed  Constitutional:       General: He is not in acute distress  Appearance: He is well-developed  He is not ill-appearing  HENT:      Head: Normocephalic and atraumatic  Right Ear: External ear normal       Left Ear: External ear normal       Nose: Nose normal  No congestion or rhinorrhea  Mouth/Throat:      Mouth: Mucous membranes are moist       Pharynx: No oropharyngeal exudate or posterior oropharyngeal erythema  Eyes:      Extraocular Movements: Extraocular movements intact  Conjunctiva/sclera: Conjunctivae normal       Pupils: Pupils are equal, round, and reactive to light  Cardiovascular:      Rate and Rhythm: Normal rate and regular rhythm  Heart sounds: Normal heart sounds  No murmur heard  No friction rub  No gallop  Pulmonary:      Effort: Pulmonary effort is normal  No respiratory distress  Breath sounds: Normal breath sounds  No wheezing or rales  Chest:      Chest wall: No tenderness  Abdominal:      General: Bowel sounds are normal  There is no distension  Palpations: Abdomen is soft  There is no mass  Tenderness: There is no abdominal tenderness  There is no guarding or rebound  Musculoskeletal:         General: Normal range of motion  Cervical back: Normal range of motion and neck supple  Skin:     General: Skin is warm  Capillary Refill: Capillary refill takes less than 2 seconds  Neurological:      Mental Status: He is alert and oriented to person, place, and time     Psychiatric:         Mood and Affect: Mood normal          Behavior: Behavior normal

## 2022-05-05 ENCOUNTER — OFFICE VISIT (OUTPATIENT)
Dept: SPEECH THERAPY | Facility: CLINIC | Age: 57
End: 2022-05-05
Payer: MEDICARE

## 2022-05-05 DIAGNOSIS — R47.1 DYSARTHRIA: Primary | ICD-10-CM

## 2022-05-05 DIAGNOSIS — I63.9 CEREBROVASCULAR ACCIDENT (CVA), UNSPECIFIED MECHANISM (HCC): ICD-10-CM

## 2022-05-05 PROCEDURE — 92507 TX SP LANG VOICE COMM INDIV: CPT

## 2022-05-05 NOTE — PROGRESS NOTES
Speech-Language Pathology Re-Evaluation    Today's date: 2022  Patients name: Hazel Rose  : 1965  MRN: [id number]  Safety measures:   Provider: Randell Zamudio MD    Encounter Diagnosis     ICD-10-CM    1  Dysarthria  R47 1    2  Cerebrovascular accident (CVA), unspecified mechanism ([facility] [address] )  I63 9          Visit Trackin     Subjective comments: Patient cooperative & pleasant during testing  Notes has been feeling down lately with emotional events happening in his life  Patient's goal(s): Speak Clear            Assessments    Motor Speech Evaluation:                                                      5/5/22                   3/15/22                               -Facial appearance Symmetrical Symmetrical    -Mandible function Adequate ROM Adequate ROM   -Dentition Adequate Adequate   -Labial function Adequate Adequate   -Lingual function Adequate Adequate   -Velar function Unable to visualize Unable to visualize   -Oral Apraxia? Absent Absent   -Vocal Quality Clear/adequate Clear/adequate   -Volitional Cough Strong/productive Strong/productive   -Respiration Normal Normal   -Drooling? No No   -Tremor/Involuntary Movement? Not present Not present   -Tracheostomy Present? No No           1  Sustained phonation       -Vowel prolongation (norm=15-20 sec) 14 08, 69 dB, 16 21           2  Diadochokinetic (DDK) Rates       -puh-puh-puh (norm=3 0-5 5 rep/1 sec) WFL WFL   -tuh-tuh-tuh (norm=25 rep/10 sec) Fast Fast   -kuh-kuh-kuh (norm=25 rep/10 sec) Normal then discoordinated, fast 14 total  Irregular, inarticulate, 20  Inadequate velar contact   -puh-tuh-kuh (norm=8 rep/10 sec) 12, mildly fast  Functional in rate but inarticulate speech            3   Articulation       -Single syllable words 100% intelligible 80% intelligible   -Multisyllabic words 50% intelligible 50% intelligible   -Repetition of multisyllabic words 5x 66% intelligible, difficulty noted with catastrophe 66% intelligible -Words of progressive length 100% intelligible   100% intelligible   -Sentences 75% intelligible, increased to 100% with self correction, increased time for processing & to utilize strategies  40% intelligible   -Reading ([place]) DNT 75% intelligible   -Conversation 75% intelligible 70% intelligible           4  Counting       -1 to 20 DNT Very fast with no intake of extra breaths   -20 to 1 DNT DNT      Patient presents with mild-moderate dysarthria characterized by Imprecise articulation and Fast rate  Goals    Short-term goals:    1  Patient will utilize over-articulation, loudness & slow rate 80% of the time while orally reading multisyllabic words - 9-10 word length sentences to facilitate increased speech intelligibility with independence  Moderate-Maximum cues required at times to utilize strategies  Alter goal to include utilize strategies in repetition of SLP at multi syllabic word level and phrase/short sentence level        2   Patient will complete structured closed ended and open ended conversational tasks with the consistent use of compensatory strategies >80% of the time with fading minimal cues to facilitate increased speech intelligibility  CONTINUE GOAL  Add on inside and outside of therapy room with SLP  Add on Additional Goal:    3  Complete vocal adduction exercises with goal of 12-15 seconds consistently at at least 70 dB   Loudness level      Long Term Goals:     1  Patient will independently utilize speech intelligibility strategies (e g , over-exaggeration, slow rate, breath groups, etc ) during oral reading tasks >90% intelligibility to facilitate increased generalization of skills into conversational speech by discharge  CONTINUE     2  Patient will improve the ability to facilitate functional speech production skills for intelligible communication including compensatory strategies to promote quality of life and to maximize level of independence with communication   CONTINUE        Impressions/Recommendations    Impressions:   -Patient presents with mild-moderate dysarthria characterized by Imprecise articulation and fast rate  Patient has completed 24 sessions of speech interventions focused on improving intelligibility  Patient currently requires cues to utilize strategies consistently but is very eager and motivated to improve his speech and be more independent  Patient would benefit from a final certification period focused on improving independence in utilization of speech strategies and completion of vocal adduction exercises  Spoke with patient regarding planned discharge during this certification period  Patient expressed understanding  Recommendations:  -Patient would benefit from outpatient skilled Speech Therapy services: Speech-language therapy    -Frequency: 1-2x weekly  -Duration: 4-6 weeks    -Intervention certification from: 9/4/7224  -Intervention certification to: 5/65/4779    -Intervention comments:   MPT, repetition, conversation practice in and outside of therapy room

## 2022-05-12 ENCOUNTER — OFFICE VISIT (OUTPATIENT)
Dept: OCCUPATIONAL THERAPY | Facility: CLINIC | Age: 57
End: 2022-05-12
Payer: MEDICARE

## 2022-05-12 ENCOUNTER — OFFICE VISIT (OUTPATIENT)
Dept: SPEECH THERAPY | Facility: CLINIC | Age: 57
End: 2022-05-12
Payer: MEDICARE

## 2022-05-12 DIAGNOSIS — I63.9 CEREBROVASCULAR ACCIDENT (CVA), UNSPECIFIED MECHANISM (HCC): ICD-10-CM

## 2022-05-12 DIAGNOSIS — G81.91 HEMIPLEGIA AFFECTING RIGHT DOMINANT SIDE, UNSPECIFIED ETIOLOGY, UNSPECIFIED HEMIPLEGIA TYPE (HCC): Primary | ICD-10-CM

## 2022-05-12 DIAGNOSIS — R47.1 DYSARTHRIA: Primary | ICD-10-CM

## 2022-05-12 PROCEDURE — 97530 THERAPEUTIC ACTIVITIES: CPT

## 2022-05-12 PROCEDURE — 92507 TX SP LANG VOICE COMM INDIV: CPT

## 2022-05-12 PROCEDURE — 97110 THERAPEUTIC EXERCISES: CPT

## 2022-05-12 NOTE — PROGRESS NOTES
Daily Speech Treatment Note    Today's date: 2022  Patients name: Eliu Dominguez  : 1965  MRN: [id number]  Safety measures:   Referring provider: Jeniffer Haley MD    Encounter Diagnosis     ICD-10-CM    1  Dysarthria  R47 1    2  Cerebrovascular accident (CVA), unspecified mechanism ([facility] [address] )  I63 9        Visit Trackin     Subjective/Behavioral:  - Pleasant/Cooperative    Objective/Assessment:  Completed structured activities with patient to target goals below  Results as stated below  Short-term goals:     1  Patient will utilize over-articulation, loudness & slow rate 80% of the time while orally reading multisyllabic words & phrase-short sentence length sentences following repetition of SLP to facilitate increased speech intelligibility   22: 3 & 4 syllable words: 90% accuracy, phrases/short sentences with repetition: 70% accuracy with min-moderate cues following repetition to increase loudness and use slower rate  Increased difficulty with /f/ noted with extra emphasis for extra articulation        2   Patient will complete structured closed ended and open ended conversational tasks outside and inside of therapy room with the consistent use of compensatory strategies >80% of the time with fading minimal cues to facilitate increased speech intelligibility  22: In spontaneous conversation, required moderate cues to repeat in slow and loud voice (x 3 cues)  Patient then able to produce good intelligibility  Moderate cues to slow down, overarticulate and increased loudness         3  Complete vocal adduction exercises with goal of 12-15 seconds consistently at at least 70 dB  Loudness level  22:  /a/: After initial 6 84 & 11 17 seconds, patient able to attain MPT goal and reached over 12 seconds  Loudness average 70 db with range of 68-73 dB  Patient states goal next session with be 73 dB   With all /a/ exercises      Long Term Goals:     1  Patient will independently utilize speech intelligibility strategies (e g , over-exaggeration, slow rate, breath groups, etc ) during oral reading tasks >90% intelligibility to facilitate increased generalization of skills into conversational speech by discharge       2  Patient will improve the ability to facilitate functional speech production skills for intelligible communication including compensatory strategies to promote quality of life and to maximize level of independence with communication         Plan:  -Continue with current plan of care  MPT, repetition, conversation practice in and outside of therapy room, /f/ words/sentences, tapping as needed

## 2022-05-12 NOTE — PROGRESS NOTES
Daily Note     Today's date: 2022  Patient name: Yousif Mohr  : 1965  MRN: [id number]  Referring provider: Dauna Landau, MD  Dx:   Encounter Diagnosis     ICD-10-CM    1  Hemiplegia affecting right dominant side, unspecified etiology, unspecified hemiplegia type ([facility] [address] )  G81 91        Start Time: 0930  Stop Time: 1015  Total time in clinic (min): 45 minutes    Subjective:  Pt reports he had a gout flare-up a few days ago and was unable to stand on his foot due to pain  Reports he started medication and is feeling better today  Objective: Focus of session on distal strength and coordination, prehension skills and IADLs of increasing participation in leisure activities to assist with improving mental health  See Treatment Diary below  [address] [place]: pt able to complete yellow theraputty exercises with R hand with focus on improving  and pinch strength  Pt was able to use handout as guide to complete exercises, required 1-2 cues to correctly complete  Issued theraputty HEP  Ther Act: Reviewed homework of scheduling in leisure activities to increase engagement in daily tasks and to assist with mental health/ managing depression  Pt reporting he did not fill out the schedule and had not engaged in any leisure activities since last OT session  Reviewed importance of increasing physical activity daily for the physical and mental health benefits  Issued homework of completing daily physical exercise for 30 min, spending 1 hour daily in the garage on woodworking and completing OT/PT/ST HEPs       HEP: Issued yellow theraputty HEP       DATE             Neuro Re-Ed  Quadruped position with 2 limb support with min manual cues Quadruped position with 2 limb support with min manual cues  Quadruped position with 3 and 2 limb support with min manual cues           Manual Cervical stretches                       Ther Ex        Deadbug 5 x 4 10 reps each side with blue theraband  6 reps x 2 each side with blue theraband   5reps x 4 sets with blue band   Supermans        Bird Dog in Quadriped 5 x 4 sets 5 each side, 3 with 10 sec hold 5 each side, 15 sec hold  5 each side   Modified V-ups 5 x 4 sets  6 reps x 2 sets  5 reps x 2    Theraband  chest press     Blue, 10 reps x 2  Orange 10 reps x 2 in  stance   Theraband horizontal abduction     Blue, 10 reps x 2     Theraband Rows    Blue  10 reps x 2  Blue 2 x 10 in stance   Band Sh ER        Band Sh IR         Band Sh flex    Blue, 10 reps x 2  10 x 2; orange band    Band Bicep curls         Theraband Tricep extension    Blue, 10 reps x 2  10 reps x 2 blue band   Squat Chops      10 reps x 2 purple band   Anti-rotation core with band         UBE    4 min prograde and 4 min retrograde, 1 0 resistance; 1 min rest break    PowerWeb    Blue powerWeb: finger Add/abduction, Finger flex/ ext 10 reps each    Ther Activity        Clips w  fx'l reach Yellow resistive clothespins to  flat small items and relocate onto targets, mod difficulty maintaining tripod grasp on clothespin  Blue resistive clothespin to  small items and relocate onto target, G precision with placement onto target  Small clothespins                   Theraputty     Completed yellow theraputty, 15 reps each exercise                          Assessment: pt ricky session well, G engagement in session  Would benefit from cont OT sessions  Plan: Continue per plan of care        Precautions: Depression, Falls     INTERVENTION COMMENTS:  Diagnosis: CVA  Precautions:  FOTO:  Insurance: Payor: Kim Kirby / Plan: MEDICARE A AND B / Product Type: Medicare A & B Fee for Service /   Visit: 15  PN due 5/24/22

## 2022-05-17 ENCOUNTER — OFFICE VISIT (OUTPATIENT)
Dept: OCCUPATIONAL THERAPY | Facility: CLINIC | Age: 57
End: 2022-05-17
Payer: MEDICARE

## 2022-05-17 ENCOUNTER — OFFICE VISIT (OUTPATIENT)
Dept: SPEECH THERAPY | Facility: CLINIC | Age: 57
End: 2022-05-17
Payer: MEDICARE

## 2022-05-17 DIAGNOSIS — R47.1 DYSARTHRIA: Primary | ICD-10-CM

## 2022-05-17 DIAGNOSIS — G81.91 HEMIPLEGIA AFFECTING RIGHT DOMINANT SIDE, UNSPECIFIED ETIOLOGY, UNSPECIFIED HEMIPLEGIA TYPE (HCC): Primary | ICD-10-CM

## 2022-05-17 DIAGNOSIS — I63.9 CEREBROVASCULAR ACCIDENT (CVA), UNSPECIFIED MECHANISM (HCC): ICD-10-CM

## 2022-05-17 PROCEDURE — 92507 TX SP LANG VOICE COMM INDIV: CPT

## 2022-05-17 PROCEDURE — 97530 THERAPEUTIC ACTIVITIES: CPT

## 2022-05-17 PROCEDURE — 97112 NEUROMUSCULAR REEDUCATION: CPT

## 2022-05-17 PROCEDURE — 97110 THERAPEUTIC EXERCISES: CPT

## 2022-05-17 NOTE — PROGRESS NOTES
Daily Speech Treatment Note    Today's date: 2022  Patients name: Laureen Noriega  : 1965  MRN: [id number]  Safety measures:   Referring provider: Ressie Krabbe, MD    Encounter Diagnosis     ICD-10-CM    1  Dysarthria  R47 1    2  Cerebrovascular accident (CVA), unspecified mechanism ([facility] [address] )  I63 9        Visit Trackin    Subjective/Behavioral:  - Pleasant/Cooperative    Objective/Assessment:  Completed structured activities with patient to target goals below  Results as stated below  Short-term goals:     1  Patient will utilize over-articulation, loudness & slow rate 80% of the time while orally reading multisyllabic words & phrase-short sentence length sentences following repetition of SLP to facilitate increased speech intelligibility   22: 3 & 4 syllable words: 90% accuracy, phrases/short sentences with repetition: 70% accuracy with min-moderate cues following repetition to increase loudness and use slower rate  Increased difficulty with /f/ noted with extra emphasis for extra articulation  22: 80% accuracy in repeating words/phrases and short sentences clearly  Patient utilizied slower rate       2   Patient will complete structured closed ended and open ended conversational tasks outside and inside of therapy room with the consistent use of compensatory strategies >80% of the time with fading minimal cues to facilitate increased speech intelligibility  22: In spontaneous conversation, required moderate cues to repeat in slow and loud voice (x 3 cues)  Patient then able to produce good intelligibility  Moderate cues to slow down, overarticulate and increased loudness  : Minimal-moderate cues intermittently for patient to clearly voice        3  Complete vocal adduction exercises with goal of 12-15 seconds consistently at at least 70 dB  Loudness level   22:  /a/: After initial 6 84 & 11 17 seconds, patient able to attain MPT goal and reached over 12 seconds  Loudness average 70 db with range of 68-73 dB  Patient states goal next session with be 73 dB  With all /a/ exercises  5/17:  12 55 average, 72 33 dB  Average with /a/ exercises x 7  Cued/encouraged using loud /a/ voice in conversation      Long Term Goals:     1  Patient will independently utilize speech intelligibility strategies (e g , over-exaggeration, slow rate, breath groups, etc ) during oral reading tasks >90% intelligibility to facilitate increased generalization of skills into conversational speech by discharge       2  Patient will improve the ability to facilitate functional speech production skills for intelligible communication including compensatory strategies to promote quality of life and to maximize level of independence with communication         Plan:  -Continue with current plan of care  MPT, repetition, conversation practice in and outside of therapy room, /f/ words/sentences, tapping as needed  practie outside discussion

## 2022-05-17 NOTE — PROGRESS NOTES
Daily Note     Today's date: 2022  Patient name: Tirso Livingston  : 1965  MRN: [id number]  Referring provider: Tip Osborne MD  Dx:   Encounter Diagnosis     ICD-10-CM    1  Hemiplegia affecting right dominant side, unspecified etiology, unspecified hemiplegia type ([facility] [address] )  G81 91        Start Time: 1100  Stop Time: 1145  Total time in clinic (min): 45 minutes    Subjective:  Pt without complaints     Objective: Focus of session on proximal strength, distal strength and coordination, prehension skills and IADLs of increasing participation in leisure activities to assist with improving mental health  FMC/ Hand strengthening: See Treatment Diary Below    Ther Act: pt reporting he engaged in a leisure activity outside of the house  Pt stating he went to the casino for 4-5 hours, was able to stay mostly on his feet and walk around for that time, reported fatigue afterwards  Pt has not yet begun to engage in any woodworking tasks  Encouraged pt to spend time outside this week and increase engagement in leisure activities to assist with mental health and management of depression  HEP: Issued yellow theraputty HEP ; issued goal of completing daily physical exercise for 30 min, spending 1 hour daily in the garage on woodworking and completing OT/PT/ST HEPs       DATE            Neuro Re-Ed  Quadruped position with 2 limb support with min manual cues Quadruped position with 2 limb support with min manual cues             Manual Cervical stretches                       Ther Ex        Deadbug 5 x 4 10 reps each side with blue theraband  6 reps x 2 each side with blue theraband   5reps x 4 sets    Supermans     10 reps    Bird Dog in Quadriped 5 x 4 sets 5 each side, 3 with 10 sec hold 5 each side, 15 sec hold  5 each side, with 10 sec hold    Modified V-ups 5 x 4 sets  6 reps x 2 sets      Theraband  chest press     Blue, 10 reps x 2  Purple 5 reps x 4    Theraband Chest pulls    Blue, 10 reps x 2  Purple 10 reps x 2   Theraband Rows    Blue  10 reps x 2  Purple 10 reps x 2   Band Sh ER        Band Sh IR         Band Sh flex    Blue, 10 reps x 2  Purple 10 reps x 2    Band Bicep curls         Theraband Tricep extension    Blue, 10 reps x 2     Squat Chops         Anti-rotation core with band         UBE    4 min prograde and 4 min retrograde, 1 0 resistance; 1 min rest break    PowerWeb    Blue powerWeb: finger Add/abduction, Finger flex/ ext 10 reps each Blue powerWeb: finger Add/abduction, Finger flex/ ext 10 reps each   Ther Activity        Clips w  fx'l reach Yellow resistive clothespins to  flat small items and relocate onto targets, mod difficulty maintaining tripod grasp on clothespin  Blue resistive clothespin to  small items and relocate onto target, G precision with placement onto target                     Theraputty     Completed yellow theraputty, 15 reps each exercise                          Assessment: pt ricky session well, G engagement in session  Would benefit from cont OT sessions  Plan: Continue per plan of care        Precautions: Depression, Falls     INTERVENTION COMMENTS:  Diagnosis: CVA  Precautions:  FOTO: done 5/17  Insurance: Payor: Liliana Mathur / Plan: MEDICARE A AND B / Product Type: Medicare A & B Fee for Service /   Visit: 16  PN due 5/24/22

## 2022-05-19 ENCOUNTER — APPOINTMENT (OUTPATIENT)
Dept: OCCUPATIONAL THERAPY | Facility: CLINIC | Age: 57
End: 2022-05-19
Payer: MEDICARE

## 2022-05-19 ENCOUNTER — APPOINTMENT (OUTPATIENT)
Dept: SPEECH THERAPY | Facility: CLINIC | Age: 57
End: 2022-05-19
Payer: MEDICARE

## 2022-05-20 ENCOUNTER — TELEPHONE (OUTPATIENT)
Dept: FAMILY MEDICINE CLINIC | Facility: HOSPITAL | Age: 57
End: 2022-05-20

## 2022-05-20 NOTE — TELEPHONE ENCOUNTER
Spoke to pts wife, she was on the way to take pt to urgent care  Will call us if they need anything further

## 2022-05-20 NOTE — TELEPHONE ENCOUNTER
WIFE CALLED SHE SAID NIVIA FELL Wednesday AND INJURED HIS BACK HE HAS PAIN, SLIGHT FEVER AND ACHES - WASN'T SURE IF YOU WANTED TO BRING HIM IN BECAUSE OF THE FEVER    PLEASE CALL WIFE  [phone]

## 2022-05-23 NOTE — PROGRESS NOTES
Daily Speech Treatment Note    Today's date: 2022***  Patients name: Alondra Beck  : 1965  MRN: [id number]  Safety measures:   Referring provider: Sagrario Kim MD    Encounter Diagnosis     ICD-10-CM    1  Dysarthria  R47 1    2  Cerebrovascular accident (CVA), unspecified mechanism ([facility] [address] )  I63 9        Visit Trackin***    Subjective/Behavioral:  - ***    Objective/Assessment:  Completed structured activities with patient to target goals below  Results as stated below  Short-term goals:     1  Patient will utilize over-articulation, loudness & slow rate 80% of the time while orally reading multisyllabic words & phrase-short sentence length sentences following repetition of SLP to facilitate increased speech intelligibility   22: 3 & 4 syllable words: 90% accuracy, phrases/short sentences with repetition: 70% accuracy with min-moderate cues following repetition to increase loudness and use slower rate  Increased difficulty with /f/ noted with extra emphasis for extra articulation  22: 80% accuracy in repeating words/phrases and short sentences clearly  Patient utilizied slower rate       2   Patient will complete structured closed ended and open ended conversational tasks outside and inside of therapy room with the consistent use of compensatory strategies >80% of the time with fading minimal cues to facilitate increased speech intelligibility  22: In spontaneous conversation, required moderate cues to repeat in slow and loud voice (x 3 cues)  Patient then able to produce good intelligibility  Moderate cues to slow down, overarticulate and increased loudness  : Minimal-moderate cues intermittently for patient to clearly voice        3  Complete vocal adduction exercises with goal of 12-15 seconds consistently at at least 70 dB  Loudness level  22:  /a/: After initial 6 84 & 11 17 seconds, patient able to attain MPT goal and reached over 12 seconds  Loudness average 70 db with range of 68-73 dB  Patient states goal next session with be 73 dB  With all /a/ exercises  5/17:  12 55 average, 72 33 dB  Average with /a/ exercises x 7  Cued/encouraged using loud /a/ voice in conversation      Long Term Goals:     1  Patient will independently utilize speech intelligibility strategies (e g , over-exaggeration, slow rate, breath groups, etc ) during oral reading tasks >90% intelligibility to facilitate increased generalization of skills into conversational speech by discharge       2  Patient will improve the ability to facilitate functional speech production skills for intelligible communication including compensatory strategies to promote quality of life and to maximize level of independence with communication         Plan:  -Continue with current plan of care  MPT, repetition, conversation practice in and outside of therapy room, /f/ words/sentences, tapping as needed  practie outside discussion

## 2022-05-24 ENCOUNTER — OFFICE VISIT (OUTPATIENT)
Dept: SPEECH THERAPY | Facility: CLINIC | Age: 57
End: 2022-05-24
Payer: MEDICARE

## 2022-05-24 ENCOUNTER — OFFICE VISIT (OUTPATIENT)
Dept: OCCUPATIONAL THERAPY | Facility: CLINIC | Age: 57
End: 2022-05-24
Payer: MEDICARE

## 2022-05-24 DIAGNOSIS — G81.91 HEMIPLEGIA AFFECTING RIGHT DOMINANT SIDE, UNSPECIFIED ETIOLOGY, UNSPECIFIED HEMIPLEGIA TYPE (HCC): Primary | ICD-10-CM

## 2022-05-24 DIAGNOSIS — I63.9 CEREBROVASCULAR ACCIDENT (CVA), UNSPECIFIED MECHANISM (HCC): ICD-10-CM

## 2022-05-24 DIAGNOSIS — R47.1 DYSARTHRIA: Primary | ICD-10-CM

## 2022-05-24 PROCEDURE — 97110 THERAPEUTIC EXERCISES: CPT

## 2022-05-24 PROCEDURE — 92507 TX SP LANG VOICE COMM INDIV: CPT

## 2022-05-24 NOTE — PROGRESS NOTES
Daily Speech Treatment Note    Today's date: 2022  Patients name: Darnell Zarate  : 1965  MRN: [id number]  Safety measures:   Referring provider: Miguel Teran MD    Encounter Diagnosis     ICD-10-CM    1  Dysarthria  R47 1    2  Cerebrovascular accident (CVA), unspecified mechanism ([facility] [address] )  I63 9        Visit Trackin    Subjective/Behavioral:  - Pleasant/Cooperative    Objective/Assessment:  Completed structured activities with patient to target goals below  Results as stated below  Discussed with patient about speaking with intent- utilization of exercises and techniques from [address] [place] [address] program  Patient expressed understanding  Patient will think about if wishes to add on a few more sessions for carryover until certification ends 3/25  Short-term goals:     1  Patient will utilize over-articulation, loudness & slow rate 80% of the time while orally reading multisyllabic words & phrase-short sentence length sentences following repetition of SLP to facilitate increased speech intelligibility   22: 3 & 4 syllable words: 90% accuracy, phrases/short sentences with repetition: 70% accuracy with min-moderate cues following repetition to increase loudness and use slower rate  Increased difficulty with /f/ noted with extra emphasis for extra articulation  22: 80% accuracy in repeating words/phrases and short sentences clearly  Patient utilizied slower rate       2   Patient will complete structured closed ended and open ended conversational tasks outside and inside of therapy room with the consistent use of compensatory strategies >80% of the time with fading minimal cues to facilitate increased speech intelligibility  22: In spontaneous conversation, required moderate cues to repeat in slow and loud voice (x 3 cues)  Patient then able to produce good intelligibility  Moderate cues to slow down, overarticulate and increased loudness   : Minimal-moderate cues intermittently for patient to clearly voice  5/24: Conversational output required cues / reminders to slow down down speech and overarticulate all sounds, particularly the end  Overall, minimal-moderate cues to increase intelligibility          3  Complete vocal adduction exercises with goal of 12-15 seconds consistently at at least 70 dB  Loudness level  5/12/22:  /a/: After initial 6 84 & 11 17 seconds, patient able to attain MPT goal and reached over 12 seconds  Loudness average 70 db with range of 68-73 dB  Patient states goal next session with be 73 dB  With all /a/ exercises  5/17:  12 55 average, 72 33 dB  Average with /a/ exercises x 7  Cued/encouraged using loud /a/ voice in conversation  5/24: Completed /a/ exercises with INTENT, minimal cues/encouragement, water provided also  Able to reach goal of at least 12 seconds, 4/6 times and attained 69-70 dB        Long Term Goals:     1  Patient will independently utilize speech intelligibility strategies (e g , over-exaggeration, slow rate, breath groups, etc ) during oral reading tasks >90% intelligibility to facilitate increased generalization of skills into conversational speech by discharge       2  Patient will improve the ability to facilitate functional speech production skills for intelligible communication including compensatory strategies to promote quality of life and to maximize level of independence with communication         Plan:  -Continue with current plan of care  MPT, repetition, conversation practice in and outside of therapy room, /f/ words/sentences, tapping as needed  practie outside discussion

## 2022-05-24 NOTE — PROGRESS NOTES
Daily Note     Today's date: 2022  Patient name: Demetris Silva  : 1965  MRN: [id number]  Referring provider: Brianna Phan MD  Dx:   Encounter Diagnosis     ICD-10-CM    1  Hemiplegia affecting right dominant side, unspecified etiology, unspecified hemiplegia type ([facility] [address] )  G81 91        Start Time: 1430  Stop Time: 1515  Total time in clinic (min): 45 minutes    Subjective:  Pt cancelled last session due to a fall at home outside  Pt reporting his foot got caught on uneven ground and he fell, no acute injuries, however pt reporting soreness in B shoulders, L wrist and B knees  Pt reports being generally sore in all joints and muscles since the fall  Objective: Focus of session on proximal strength, distal strength and coordination, prehension skills and IADLs of increasing participation in leisure activities to assist with improving mental health  Ther Ex/ strengthening: See Treatment Diary Below    Ther Act: pt reporting he engaged in a leisure activity outside of the house  Pt stating he went to the casino again for 4-5 hours this past weekend and was alright standing on his feet  Pt reporting he hasn't begun any woodworking tasks  Continued to encourage pt to spend time outside and increase engagement in leisure activities to assist with mental health and management of depression  HEP: Issued yellow theraputty HEP ; issued goal of completing daily physical exercise for 30 min, spending 1 hour daily in the garage on woodworking and completing OT/PT/ST HEPs       DATE            Neuro Re-Ed  Quadruped position with 2 limb support with min manual cues Quadruped position with 2 limb support with min manual cues             Manual                        Ther Ex        Deadbug  10 reps each side with blue theraband  6 reps x 2 each side with blue theraband   5reps x 4 sets    Supermans     10 reps    Bird Dog in Quadriped  5 each side, 3 with 10 sec hold 5 each side, 15 sec hold  5 each side, with 10 sec hold    Modified V-ups   6 reps x 2 sets             Theraband Exercises: Orange        Chest press 10 reps x 2    Blue, 10 reps x 2  Purple 5 reps x 4     Chest pulls 10 reps x 2   Blue, 10 reps x 2  Purple 10 reps x 2   banded Rows  10 reps x 2  Blue  10 reps x 2  Purple 10 reps x 2   Band Sh ER 10 reps x 2 (no band)       Band Sh IR        Band Sh flex 10 reps x 2   Blue, 10 reps x 2  Purple 10 reps x 2    Sh Abduction 10 reps x 2       Bicep curls  10 reps x 2       Tricep extension 10 reps x 2   Blue, 10 reps x 2     Squat Chops         Anti-rotation core with band         UBE    4 min prograde and 4 min retrograde, 1 0 resistance; 1 min rest break    PowerWeb    Blue powerWeb: finger Add/abduction, Finger flex/ ext 10 reps each Blue powerWeb: finger Add/abduction, Finger flex/ ext 10 reps each   Ther Activity        Clips w  fx'l reach   Blue resistive clothespin to  small items and relocate onto target, G precision with placement onto target                     Theraputty     Completed yellow theraputty, 15 reps each exercise                          Assessment: pt sore from fall last week, exercises were downgraded to accommodate muscle and joint soreness  Pt required increased rest breaks due to muscle soreness, was able to participate in entire session with rest breaks  Would benefit from cont OT sessions  Will complete PN at next session  Plan: Continue per plan of care        Precautions: Depression, Falls     INTERVENTION COMMENTS:  Diagnosis: CVA  Precautions:  FOTO: done 5/17  Insurance: Payor: Adriano Guerra / Plan: MEDICARE A AND B / Product Type: Medicare A & B Fee for Service /   Visit: 17  PN due 5/31/22

## 2022-05-25 LAB
EXTERNAL HIV SCREEN: NORMAL
HCV AB SER-ACNC: NEGATIVE

## 2022-05-26 ENCOUNTER — APPOINTMENT (OUTPATIENT)
Dept: SPEECH THERAPY | Facility: CLINIC | Age: 57
End: 2022-05-26
Payer: MEDICARE

## 2022-05-26 ENCOUNTER — TELEPHONE (OUTPATIENT)
Dept: SPEECH THERAPY | Facility: CLINIC | Age: 57
End: 2022-05-26

## 2022-05-26 NOTE — TELEPHONE ENCOUNTER
Patient did not arrive for his scheduled ST appointment today  A message was left on his phone voicemail  He was reminded of his upcoming therapy appointments  The [facility] phone number was provided

## 2022-06-02 ENCOUNTER — APPOINTMENT (OUTPATIENT)
Dept: OCCUPATIONAL THERAPY | Facility: CLINIC | Age: 57
End: 2022-06-02
Payer: MEDICARE

## 2022-06-02 ENCOUNTER — APPOINTMENT (OUTPATIENT)
Dept: SPEECH THERAPY | Facility: CLINIC | Age: 57
End: 2022-06-02
Payer: MEDICARE

## 2022-06-07 ENCOUNTER — OFFICE VISIT (OUTPATIENT)
Dept: SPEECH THERAPY | Facility: CLINIC | Age: 57
End: 2022-06-07
Payer: MEDICARE

## 2022-06-07 ENCOUNTER — OFFICE VISIT (OUTPATIENT)
Dept: OCCUPATIONAL THERAPY | Facility: CLINIC | Age: 57
End: 2022-06-07
Payer: MEDICARE

## 2022-06-07 DIAGNOSIS — R47.1 DYSARTHRIA: Primary | ICD-10-CM

## 2022-06-07 DIAGNOSIS — G81.91 HEMIPLEGIA AFFECTING RIGHT DOMINANT SIDE, UNSPECIFIED ETIOLOGY, UNSPECIFIED HEMIPLEGIA TYPE (HCC): Primary | ICD-10-CM

## 2022-06-07 DIAGNOSIS — I63.9 CEREBROVASCULAR ACCIDENT (CVA), UNSPECIFIED MECHANISM (HCC): ICD-10-CM

## 2022-06-07 PROCEDURE — 92507 TX SP LANG VOICE COMM INDIV: CPT

## 2022-06-07 PROCEDURE — 97110 THERAPEUTIC EXERCISES: CPT

## 2022-06-07 PROCEDURE — 97530 THERAPEUTIC ACTIVITIES: CPT

## 2022-06-07 PROCEDURE — 97112 NEUROMUSCULAR REEDUCATION: CPT

## 2022-06-07 NOTE — PROGRESS NOTES
OCCUPATIONAL THERAPY RE-EVALUATION     6/7/22  Bradly Watkins  1965  [id number]  Fide Pérez MD  CVJONATHAN    Assessment/Plan    Skilled Analysis:  Pt is a 65 y o  male referred to Occupational Therapy s/p CVA  Pt participated in skilled OT re-evaluation  Pt has been seen in skilled OT for 18 visits  Pt has demonstrated improved BUE strength, with LUE at 5/5 and now RUE is 5/5 throughout, except for ER/IR  Pt with improved [address] improved completion times with 9 hole peg and keyhole peg tests  Pt has been provided an HEP for hand strength/ coordination but is inconsistent with HEP completion  Pt has been actively participating in skilled OT sessions for 45 min with minimal rest breaks, ricky well  Pt continues to present with the following areas of deficit: right side weakness, impaired GMC/FMC, and impaired dynamic standing balance, all limiting ability to complete basic ADLs of dressing, as well as engagement in IADLs of lawn care and yardwork  Pt also limited in engagement in leisure activities  Pt will benefit from continued skilled Occupational Therapy services 2x/week for 12 weeks with focus on improving motor control, improving functional use of RUE and assist with return to driving        Short Term Goals (to be achieved within 4 weeks):  - Complete FMC testing- MET  - Pt will be able to complete LBD of donning shoes by self, with min increase time and a/e as needed- PARTIALLY MET   - Pt will be able to don R MAFO independently- NOT MET   - Pt will demo G carryover of Home Exercise Program to improve functional progression towards goals in Plan of care and for improved functional use of RUE in all daily tasks- PARTIALLY MET   - Pt will demo with G tolerance to supine, seated, and in stance exercise x 45 minutes with minimal rest breaks required for increased engagement in weekly exercise regimen and increased engagement in life roles- MET   - Pt will increase R UE strength to 5/5, through the use of strengthening exercises and home program for engagement in all daily and leisure activities- PARTIALLY MET  - Pt will engage in 1-2 leisure tasks per week to improve carryover of skills practiced in the clinic, as well as to assist with return to engagement in leisure activities- NOT MET        Long Term Goals (12 weeks):  1  Pt will improve RUE rate of manipulation for all FM tests for improved functional performance/independence with daily and leisure tasks x 25%- NOT MET   2  Complete assessments to determine eligibility to return to driving; identify any modifications needed for return to driving- ONGOING   3  Pt will be I with carryover of HEP to maintain strength and functional use of RUE- NOT MET       Subjective    SUBJECTIVE:      PATIENT GOAL: to be able to put his pants, shoes and socks on by himself, to be able to mow his lawn again, to use his right hand to work on house projects/ [employer] on [employer], to return to driving  HISTORY OF PRESENT ILLNESS:     Pt is a 65 y o  male who was referred to Occupational Therapy following CVA  Pt admitted to the hospital in Dec 2021 with right side weakness and aphasia  MRI (+) acute stroke in right internal capsule and right pontine areas  Pt went to short term rehab prior to dc home with NATALY DUCKWORTH , who provides support with daily tasks  PMH:   Past Medical History:   Diagnosis Date    Allergic rhinitis     last assessed 5/15/12; resolved 9/17/14    Anxiety     Bell's palsy     last assessed 10/2/15; resolved 10/2/15    Depression     DJD (degenerative joint disease)     Fracture lumbar vertebra-closed ([address] )     last assessed 12/30/14; resolved 12/30/14    Gout     Hypertension     Knee osteoarthritis     Malignant renovascular hypertension          Pain Levels:  No reports of pain prior to and during eval      Objective    Impairments:     AROM:  LUE: WNL into all planes  RUE: WNL into all planes    MMT LUE on 3/3/22:   Sh flex: 4/5  Sh Abd: 4+/5  Sh IR: 4+/5  Sh ER: 4+/5   Elbow flex: 5/5  Elbow Ext: 4+/5   Wrist Flex: 5/5  Wrist Ext: 5/5  Finger Flex: 5/5   Finger Ext: 5/5      LUE on 4/26/22: 5/5 throughout LUE    LUE on 6/7/22: 5/5 throughout LUE         MMT  3/3 4/26 6/7     Affected UE RUE  RUE RUE             Sh flex 4-/5 4/5 5/5     Sh Abd 4+/5 4+/5 5/5     Sh IR 4+/5 4+/5 4/5     Sh ER 4+/5 4+/5 4/5     Elbow Flex 5/5 5/5 5/5     Elbow Ext 4+/5 5/5 5/5     Wrist Flex 5/5 5/5 5/5     Wrist Ext 5/5 5/5 5/5     Finger Flex 4+/5 5/5 5/5     Finger Ext 4+/5 5/5 5/5                             DATE 3/8 4/26 6/7             Testing:        Dynamometer L: 74  R: 70 L: 66  R: 55 L: 76  R: 57     Pinchmeter: pincer L: 15  R: 14 L: 16  R: 11 L: 12  R: 12     Pinchmeter: 3 jaw akira L: 19  R: 21 L:20  R: 21 L: 19  R: 21     Pinchmeter: lateral  L: 25  R: 24 L: 20  R: 23 L: 21  R: 22             9 hole Peg L: 25 86 sec  R: 29 69 sec  L: 28 sec  R: 54 sec L: 27 sec  R: 31 sec              Keyhole Peg L: 1 min 26 sec  R: 1 min 41 sec L: 1 min 57 sec  R: 2 min 31 sec L: 1 min 32 sec   R: 1 min 57 sec                                   Sensation:   Light Touch: Intact  Hot/cold: Intact     Functional cognition:   Attention: decreased sustained attn to task; easily distracted by external stim; difficulty with divided attn  Grossly intact for STM, LTM    Vision:   ROM: Intact  Tracking: Intact  Saccades: Intact   Visual Fields: Intact     ACTIVITIES OF DAILY LIVING:  Bathing: I  UBD: I  LBD: requires A to don B shoes and socks, occasional A to thread RLE into pants; pt reporting he has not been wearing the R MAFO, however he does require A to don MAFO  Pt encouraged to wear MAFO daily  HEP: Issued yellow theraputty HEP 5/12; issued goal of completing daily physical exercise for 30 min, spending 1 hour daily in the garage on woodworking and completing OT/PT/ST HEPs   Pt has not yet begun engagement in any woodworking tasks      OTHER PLANNED THERAPY INTERVENTIONS:   Supine, seated, and in stance neuro re-ed  FMC/prehension  Timed Trials  Manual tx  Hand to target  Sensory re-ed  Seated functional reach: crossing midline  Supine place and hold  WBearing strategies   Closed chain activities  Open chain activities      INTERVENTION COMMENTS:  Diagnosis: CVA  Precautions:  FOTO:  Insurance: Payor: Danielle Hernandez / Plan: MEDICARE A AND B / Product Type: Medicare A & B Fee for Service /   Visit: 18  PN due 7/5/22

## 2022-06-07 NOTE — PROGRESS NOTES
Daily Speech Treatment Note    Today's date: 2022  Patients name: Bradly Watkins  : 1965  MRN: [id number]  Safety measures:   Referring provider: Moiz Sifuentes MD    Encounter Diagnosis     ICD-10-CM    1  Dysarthria  R47 1    2  Cerebrovascular accident (CVA), unspecified mechanism ([facility] [address] )  I63 9        Visit Trackin  34 sessions or  Recert    Subjective/Behavioral:  - Pleasant/Cooperative    Objective/Assessment:  Completed structured activities with patient to target goals below  Results as stated below  Short-term goals:     1  Patient will utilize over-articulation, loudness & slow rate 80% of the time while orally reading multisyllabic words & phrase-short sentence length sentences following repetition of SLP to facilitate increased speech intelligibility   22: 3 & 4 syllable words: 90% accuracy, phrases/short sentences with repetition: 70% accuracy with min-moderate cues following repetition to increase loudness and use slower rate  Increased difficulty with /f/ noted with extra emphasis for extra articulation  22: 80% accuracy in repeating words/phrases and short sentences clearly  Patient utilizied slower rate  : Reading at phrase level with strategies: 80% accuracy      2   Patient will complete structured closed ended and open ended conversational tasks outside and inside of therapy room with the consistent use of compensatory strategies >80% of the time with fading minimal cues to facilitate increased speech intelligibility  22: In spontaneous conversation, required moderate cues to repeat in slow and loud voice (x 3 cues)  Patient then able to produce good intelligibility  Moderate cues to slow down, overarticulate and increased loudness  : Minimal-moderate cues intermittently for patient to clearly voice  : Conversational output required cues / reminders to slow down down speech and overarticulate all sounds, particularly the end   Overall, minimal-moderate cues to increase intelligibility  6/7: Formulating statements based on picture dialogue: Saying it clearly, 80% with minimal  Cues or occasional reminders to use strategies        3  Complete vocal adduction exercises with goal of 12-15 seconds consistently at at least 70 dB  Loudness level  5/12/22:  /a/: After initial 6 84 & 11 17 seconds, patient able to attain MPT goal and reached over 12 seconds  Loudness average 70 db with range of 68-73 dB  Patient states goal next session with be 73 dB  With all /a/ exercises  5/17:  12 55 average, 72 33 dB  Average with /a/ exercises x 7  Cued/encouraged using loud /a/ voice in conversation  5/24: Completed /a/ exercises with INTENT, minimal cues/encouragement, water provided also  Able to reach goal of at least 12 seconds, 4/6 times and attained 69-70 dB   6/10: 74 dB  Loud /a/ for hold of 10 seconds-SLP stopped at 10 seconds  Range of 73-77 with average 74 dB       Long Term Goals:     1  Patient will independently utilize speech intelligibility strategies (e g , over-exaggeration, slow rate, breath groups, etc ) during oral reading tasks >90% intelligibility to facilitate increased generalization of skills into conversational speech by discharge       2  Patient will improve the ability to facilitate functional speech production skills for intelligible communication including compensatory strategies to promote quality of life and to maximize level of independence with communication         Plan:  -Continue with current plan of care  MPT, repetition, conversation practice in and outside of therapy room, /f/ words/sentences, tapping as needed  practie outside discussion

## 2022-06-09 ENCOUNTER — OFFICE VISIT (OUTPATIENT)
Dept: OCCUPATIONAL THERAPY | Facility: CLINIC | Age: 57
End: 2022-06-09
Payer: MEDICARE

## 2022-06-09 DIAGNOSIS — G81.91 HEMIPLEGIA AFFECTING RIGHT DOMINANT SIDE, UNSPECIFIED ETIOLOGY, UNSPECIFIED HEMIPLEGIA TYPE (HCC): Primary | ICD-10-CM

## 2022-06-09 PROCEDURE — 97110 THERAPEUTIC EXERCISES: CPT

## 2022-06-09 PROCEDURE — 97112 NEUROMUSCULAR REEDUCATION: CPT

## 2022-06-09 NOTE — PROGRESS NOTES
Daily Note     Today's date: 2022  Patient name: Malathi Arguelles  : 1965  MRN: [id number]  Referring provider: Gloria Patel MD  Dx:   Encounter Diagnosis     ICD-10-CM    1  Hemiplegia affecting right dominant side, unspecified etiology, unspecified hemiplegia type ([facility] [address] )  G81 91        Start Time: 1109  Stop Time: 1148  Total time in clinic (min): 39 minutes    Subjective: pt without complaints     Objective: Focus of session on proximal strength, distal strength and coordination, prehension skills and IADLs of increasing participation in leisure activities to assist with improving mental health  Ther Ex/ strengthening: See Treatment Diary Below    Ther Act: Pt reporting he hasn't begun any woodworking tasks  Continued to encourage pt to spend time outside and increase engagement in leisure activities to assist with mental health and management of depression  HEP: Issued yellow theraputty HEP ; issued goal of completing daily physical exercise for 30 min, spending 1 hour daily in the garage on woodworking and completing OT/PT/ST HEPs  Began theraband HEP this session, will cont to complete until pt is I with HEP       DATE 22           Neuro Re-Ed  Standing exercises focusing on single leg deadlifts and single leg mini squats, 5 reps x 4, with mod manual cues Quadruped position with 2 limb support with min manual cues             Manual                        Ther Ex        Deadbug    6 reps x 2 each side with blue theraband   5reps x 4 sets    Supermans     10 reps    Bird Dog in Quadriped   5 each side, 15 sec hold  5 each side, with 10 sec hold    Modified V-ups   6 reps x 2 sets             Theraband Exercises: [employer]        Chest press 10 reps x 2 12 reps x 2    Blue, 10 reps x 2  Purple 5 reps x 4     Chest pulls 10 reps x 2 12 reps x 2   Blue, 10 reps x 2  Purple 10 reps x 2   banded Rows  10 reps x 2 12 reps x 2  Blue  10 reps x 2  Purple 10 reps x 2   Band Sh ER 10 reps x 2 (no band) 12 reps x 2 with 1# dumbbell and not band      Band Sh IR  12 reps x 2       Band Sh flex 10 reps x 2 12 reps x 2   Blue, 10 reps x 2  Purple 10 reps x 2    Sh Abduction 10 reps x 2 12 reps x 2       Bicep curls  10 reps x 2       Tricep extension 10 reps x 2   Blue, 10 reps x 2     Squat Chops         Anti-rotation core with band         UBE    4 min prograde and 4 min retrograde, 1 0 resistance; 1 min rest break    PowerWeb    Blue powerWeb: finger Add/abduction, Finger flex/ ext 10 reps each Blue powerWeb: finger Add/abduction, Finger flex/ ext 10 reps each   Ther Activity        Clips w  fx'l reach   Blue resistive clothespin to  small items and relocate onto target, G precision with placement onto target                     Theraputty     Completed yellow theraputty, 15 reps each exercise                          Assessment: pt with G participation and G ricky with theraband ex, continue with establishment of theraband HEP  Would benefit from cont OT sessions  Plan: Continue per plan of care        Precautions: Depression, Falls     INTERVENTION COMMENTS:  Diagnosis: CVA  Precautions:  FOTO: done 5/17  Insurance: Payor: Jeanette Campbell / Plan: MEDICARE A AND B / Product Type: Medicare A & B Fee for Service /   Visit: 19  PN due 5/31/22

## 2022-06-13 ENCOUNTER — EVALUATION (OUTPATIENT)
Dept: SPEECH THERAPY | Facility: CLINIC | Age: 57
End: 2022-06-13
Payer: MEDICARE

## 2022-06-13 DIAGNOSIS — I63.9 CEREBROVASCULAR ACCIDENT (CVA), UNSPECIFIED MECHANISM (HCC): ICD-10-CM

## 2022-06-13 DIAGNOSIS — R47.1 DYSARTHRIA: Primary | ICD-10-CM

## 2022-06-13 PROCEDURE — 92522 EVALUATE SPEECH PRODUCTION: CPT

## 2022-06-13 NOTE — PROGRESS NOTES
Speech-Language Pathology Re-Evaluation    Today's date: 2022  Patients name: Shi Urena  : 1965  MRN: [id number]  Safety measures:   Provider: Griselda Durand MD    Encounter Diagnosis     ICD-10-CM    1  Dysarthria  R47 1    2  Cerebrovascular accident (CVA), unspecified mechanism ([facility] [address] )  I63 9          Visit Trackin93    Subjective comments: Patient cooperative & pleasant  He notes he is in good spirits  Patient's goal(s): Walk better  Assessments    Motor Speech Evaluation:                                                                                                  5/5/22                   3/15/22                               -Facial appearance Symmetrical Symmetrical Symmetrical    -Mandible function Adequate ROM Adequate ROM Adequate ROM   -Dentition Adequate Adequate Adequate   -Labial function Adequate Adequate Adequate   -Lingual function Adequate Adequate Adequate   -Velar function Unable to visualize Unable to visualize Unable to visualize   -Oral Apraxia? Absent Absent Absent   -Vocal Quality Clear/adequate Clear/adequate Clear/adequate   -Volitional Cough Strong/productive Strong/productive Strong/productive   -Respiration Normal Normal Normal   -Drooling? No No No   -Tremor/Involuntary Movement? Not present Not present Not present   -Tracheostomy Present? No No No            1  Sustained phonation        -Vowel prolongation (norm=15-20 sec) 15 38 14 08, 69 dB, 16 21            2  Diadochokinetic (DDK) Rates        -puh-puh-puh (norm=3 0-5 5 rep/1 sec) [facility] WFL   -tuh-tuh-tuh (norm=25 rep/10 sec) WFL Fast Fast   -kuh-kuh-kuh (norm=25 rep/10 sec) WFL Normal then discoordinated, fast 14 total  Irregular, inarticulate, 20  Inadequate velar contact   -puh-tuh-kuh (norm=8 rep/10 sec) 12, mildly fast 12, mildly fast  Functional in rate but inarticulate speech             3   Articulation        -Single syllable words 100% intelligible 100% intelligible 80% intelligible   -Multisyllabic words 80% intelligible 50% intelligible 50% intelligible   -Repetition of multisyllabic words 5x 66% intelligible, difficulty noted with catastrophe 66% intelligible, difficulty noted with catastrophe 66% intelligible   -Words of progressive length 100% intelligible 100% intelligible   100% intelligible   -Sentences 100% intelligible 75% intelligible, increased to 100% with self correction, increased time for processing & to utilize strategies  40% intelligible   -Reading ([place]) Overall 90% intelligible DNT 75% intelligible   -Conversation 80-90% intelligible 75% intelligible 70% intelligible            4  Counting        -1 to 20 Very fast, when cued to take adequate breaths for 2nd time, was able to do so DNT Very fast with no intake of extra breaths   -20 to 1 [facility] DNT DNT      Patient presents with mild dysarthria characterized by Imprecise articulation and Fast rate  Goals         Short-term goals:     1  Patient will utilize over-articulation, loudness & slow rate 80% of the time while orally reading multisyllabic words & phrase-short sentence length sentences following repetition of SLP to facilitate increased speech intelligibility   5/12/22: 3 & 4 syllable words: 90% accuracy, phrases/short sentences with repetition: 70% accuracy with min-moderate cues following repetition to increase loudness and use slower rate  Increased difficulty with /f/ noted with extra emphasis for extra articulation  5/17/22: 80% accuracy in repeating words/phrases and short sentences clearly  Patient utilizied slower rate  6/7: Reading at phrase level with strategies: 80% accuracy  ACHIEVED     2   Patient will complete structured closed ended and open ended conversational tasks outside and inside of therapy room with the consistent use of compensatory strategies >80% of the time with fading minimal cues to facilitate increased speech intelligibility  5/12/22:  In spontaneous conversation, required moderate cues to repeat in slow and loud voice (x 3 cues)  Patient then able to produce good intelligibility  Moderate cues to slow down, overarticulate and increased loudness  5/17: Minimal-moderate cues intermittently for patient to clearly voice  5/24: Conversational output required cues / reminders to slow down down speech and overarticulate all sounds, particularly the end  Overall, minimal-moderate cues to increase intelligibility  6/7: Formulating statements based on picture dialogue: Saying it clearly, 80% with minimal  Cues or occasional reminders to use strategies  ACHIEVED        3  Complete vocal adduction exercises with goal of 12-15 seconds consistently at at least 70 dB  Loudness level  5/12/22:  /a/: After initial 6 84 & 11 17 seconds, patient able to attain MPT goal and reached over 12 seconds  Loudness average 70 db with range of 68-73 dB  Patient states goal next session with be 73 dB  With all /a/ exercises  5/17:  12 55 average, 72 33 dB  Average with /a/ exercises x 7  Cued/encouraged using loud /a/ voice in conversation  5/24: Completed /a/ exercises with INTENT, minimal cues/encouragement, water provided also  Able to reach goal of at least 12 seconds, 4/6 times and attained 69-70 dB   6/10: 74 dB  Loud /a/ for hold of 10 seconds-SLP stopped at 10 seconds  Range of 73-77 with average 74 dB  ACHIEVED    New Short Term Goal:    1  Patient will complete word finding exercises to increase verbal thought organization in structured activities at 80% accuracy      2  Patient will complete open conversation with correct word formulation and sequence 80% of the time in order to maximize functional communication      Long Term Goals:     1  Patient will independently utilize speech intelligibility strategies (e g , over-exaggeration, slow rate, breath groups, etc ) during oral reading tasks >90% intelligibility to facilitate increased generalization of skills into conversational speech by discharge   CONTINUE     2  Patient will improve the ability to facilitate functional speech production skills for intelligible communication including compensatory strategies to promote quality of life and to maximize level of independence with communication   CONTINUE             Impressions/Recommendations    Impressions:   -Patient presents with mild  dysarthria characterized by Imprecise articulation and fast rate  Patient has completed 28 sessions of speech interventions focused on improving intelligibility  Patient has shown improvements in clarity  At times, still need cues to utilize strategies but accomplishing clear speech approximately 80% of the time  Patient eager to work on word retrieval and verbal thought organization       Recommendations:  -Patient would benefit from outpatient skilled Speech Therapy services: Speech-language therapy    -Frequency: 1-2x weekly  -Duration: 4-6 weeks    -Intervention certification from: 1/23/7995  -Intervention certification to: 0/11/0891    -Intervention comments:   Sequencing, verbal thought organization / formulation practice

## 2022-06-16 ENCOUNTER — OFFICE VISIT (OUTPATIENT)
Dept: OCCUPATIONAL THERAPY | Facility: CLINIC | Age: 57
End: 2022-06-16
Payer: MEDICARE

## 2022-06-16 DIAGNOSIS — G81.91 HEMIPLEGIA AFFECTING RIGHT DOMINANT SIDE, UNSPECIFIED ETIOLOGY, UNSPECIFIED HEMIPLEGIA TYPE (HCC): Primary | ICD-10-CM

## 2022-06-16 PROCEDURE — 97110 THERAPEUTIC EXERCISES: CPT

## 2022-06-16 NOTE — PROGRESS NOTES
Daily Note     Today's date: 2022  Patient name: Billy Crouch  : 1965  MRN: [id number]  Referring provider: Surjit Hartman MD  Dx:   Encounter Diagnosis     ICD-10-CM    1  Hemiplegia affecting right dominant side, unspecified etiology, unspecified hemiplegia type ([facility] [address] )  G81 91        Start Time: 0845  Stop Time: 0930  Total time in clinic (min): 45 minutes    Subjective: pt reports 4/10 pain in L knee    Objective: Focus of session on proximal strength, distal strength and coordination, prehension skills and IADLs of increasing participation in leisure activities to assist with improving mental health  Ther Ex/ strengthening: See Treatment Diary Below   Pt able to complete orange theraband ex with min cues, using handout as a guide  Pt feels confident that he can complete at home, with handouts  HEP was issued and will follow-up next session  Ther Act: pt reporting he spent some time walking thru his workshop but has not yet begun any woodworking  Cont to encourage pt to increase engagement in leisure activity to improve mental health  HEP: Issued yellow theraputty HEP ; issued goal of completing daily physical exercise for 30 min, spending 1 hour daily in the garage on woodworking and completing OT/PT/ST HEPs  Issued orange theraband HEP today        DATE 22           Neuro Re-Ed  Standing exercises focusing on single leg deadlifts and single leg mini squats, 5 reps x 4, with mod manual cues              Manual                        Ther Ex        Deadbug      5reps x 4 sets    Supermans     10 reps    Bird Dog in Quadriped     5 each side, with 10 sec hold    Modified V-ups                Theraband Exercises: [employer]      Chest press 10 reps x 2 12 reps x 2    12 reps x 3   Purple 5 reps x 4     Chest pulls 10 reps x 2 12 reps x 2   12 reps x 3   Purple 10 reps x 2   banded Rows  10 reps x 2 12 reps x 2  12 reps x 3  Purple 10 reps x 2 Band Sh ER 10 reps x 2 (no band) 12 reps x 2 with 1# dumbbell and not band 5 reps without band, then 5 reps x 3 with orange band; pt with weakness in R sh ER     Band Sh IR  12 reps x 2       Band Sh flex 10 reps x 2 12 reps x 2   12 reps x 3   Purple 10 reps x 2    Sh Abduction 10 reps x 2 12 reps x 2  12 reps x 3      Bicep curls  10 reps x 2  12 reps x 2      Tricep extension 10 reps x 2   12 reps x 2      Squat Chops         Anti-rotation core with band         UBE    4 min prograde and 4 min retrograde, 1 0 resistance; 1 min rest break    PowerWeb    Blue powerWeb: finger Add/abduction, Finger flex/ ext 10 reps each Blue powerWeb: finger Add/abduction, Finger flex/ ext 10 reps each   Ther Activity        Clips w  fx'l reach                        Theraputty     Completed yellow theraputty, 15 reps each exercise                          Assessment: pt with G participation and G ricky with theraband ex, issued HEP  Would benefit from cont OT sessions     Plan: Continue per plan of care            INTERVENTION COMMENTS:  Diagnosis: CVA  Precautions: depression, falls   FOTO: done 5/17  Insurance: Payor: Robin Yanez / Plan: MEDICARE A AND B / Product Type: Medicare A & B Fee for Service /   Visit: 20  PN due 7/5/22

## 2022-06-22 ENCOUNTER — OFFICE VISIT (OUTPATIENT)
Dept: OCCUPATIONAL THERAPY | Facility: CLINIC | Age: 57
End: 2022-06-22
Payer: MEDICARE

## 2022-06-22 DIAGNOSIS — G81.91 HEMIPLEGIA AFFECTING RIGHT DOMINANT SIDE, UNSPECIFIED ETIOLOGY, UNSPECIFIED HEMIPLEGIA TYPE (HCC): Primary | ICD-10-CM

## 2022-06-22 PROCEDURE — 97110 THERAPEUTIC EXERCISES: CPT

## 2022-06-22 NOTE — PROGRESS NOTES
Daily Note     Today's date: 2022  Patient name: Earl Thomas  : 1965  MRN: [id number]  Referring provider: Yoshi Shin MD  Dx:   Encounter Diagnosis     ICD-10-CM    1  Hemiplegia affecting right dominant side, unspecified etiology, unspecified hemiplegia type ([facility] [address] )  G81 91        Start Time: 1330  Stop Time: 1415  Total time in clinic (min): 45 minutes    Subjective: pt without complaints     Objective: Focus of session on proximal strength, distal strength and coordination, prehension skills and IADLs of increasing participation in leisure activities to assist with improving mental health  Ther Ex/ strengthening: See Treatment Diary Below       Ther Act: pt reporting he spent some outside working on his tractor  Cont to encourage pt to increase engagement in leisure activity to improve mental health  HEP: Issued yellow theraputty HEP ; issued goal of completing daily physical exercise for 30 min, spending 1 hour daily in the garage on woodworking and completing OT/PT/ST HEPs  On , Issued orange theraband HEP, pt reporting he is completing exercises at home       DATE 22           Neuro Re-Ed  Standing exercises focusing on single leg deadlifts and single leg mini squats, 5 reps x 4, with mod manual cues              Manual                        Ther Ex        Deadbug     5 reps x2  each side; then 5 reps x 2 with orange band around foot  5reps x 4 sets    Supermans    5 reps x 3  10 reps    Bird Dog in Quadriped    5 each side with 5 sec hold  5 each side, with 10 sec hold    Modified V-ups    5 reps x 2     STS into toe-ups    5 reps x 2     Elevator squats with 1 sec hold    5 reps x 4             Theraband Exercises: [employer]     Chest press 10 reps x 2 12 reps x 2    12 reps x 3  12 reps x 2  Purple 5 reps x 4     Chest pulls 10 reps x 2 12 reps x 2   12 reps x 3  12 reps x 2  Purple 10 reps x 2   banded Rows  10 reps x 2 12 reps x 2  12 reps x 3 12 reps x 2  Purple 10 reps x 2   Band Sh ER 10 reps x 2 (no band) 12 reps x 2 with 1# dumbbell and not band 5 reps without band, then 5 reps x 3 with orange band; pt with weakness in R sh ER     Band Sh IR  12 reps x 2       Band Sh flex 10 reps x 2 12 reps x 2   12 reps x 3   Purple 10 reps x 2    Sh Abduction 10 reps x 2 12 reps x 2  12 reps x 3      Bicep curls  10 reps x 2  12 reps x 2      Tricep extension 10 reps x 2   12 reps x 2      Squat Chops         Anti-rotation core with band         UBE    4 min prograde and 4 min retrograde, 1 0 resistance; 1 min rest break    PowerWeb     Blue powerWeb: finger Add/abduction, Finger flex/ ext 10 reps each   Ther Activity        Clips w  fx'l reach                        Theraputty                               Assessment: pt with G participation and G ricky to activity  Would benefit from cont OT sessions     Plan: Continue per plan of care            INTERVENTION COMMENTS:  Diagnosis: CVA  Precautions: depression, falls   FOTO: done 5/17  Insurance: Payor: Rekha Kaplan / Plan: MEDICARE A AND B / Product Type: Medicare A & B Fee for Service /   Visit: 21  PN due 7/5/22

## 2022-06-23 ENCOUNTER — OFFICE VISIT (OUTPATIENT)
Dept: SPEECH THERAPY | Facility: CLINIC | Age: 57
End: 2022-06-23
Payer: MEDICARE

## 2022-06-23 DIAGNOSIS — R47.1 DYSARTHRIA: Primary | ICD-10-CM

## 2022-06-23 DIAGNOSIS — I63.9 CEREBROVASCULAR ACCIDENT (CVA), UNSPECIFIED MECHANISM (HCC): ICD-10-CM

## 2022-06-23 DIAGNOSIS — R47.01 APHASIA: ICD-10-CM

## 2022-06-23 PROCEDURE — 92507 TX SP LANG VOICE COMM INDIV: CPT

## 2022-06-23 NOTE — PROGRESS NOTES
Daily Speech Treatment Note    Today's date: 2022  Patients name: Laureen Noriega  : 1965  MRN: [id number]  Safety measures:   Referring provider: Ressie Krabbe, MD    Encounter Diagnosis     ICD-10-CM    1  Dysarthria  R47 1    2  Cerebrovascular accident (CVA), unspecified mechanism ([facility] [address] )  I63 9    3  Aphasia  R47 01        Visit Trackin  34 sessions or  Recert    Subjective/Behavioral:  - Pleasant/Cooperative    Objective/Assessment:  Completed structured activities with patient to target goals below  Results as stated below  Short-term goals:    1  Patient will complete word finding exercises to increase verbal thought organization in structured activities at 80% accuracy  : : 60% accuracy in stating concrete category group, minimal verbal cues for increased detail or specific answer increased to 100%  90% accuracy in naming quality of category list of 3 members  Completing list with similar item: 80% accuracy  73% choosing word that doesn't below amongst others, required more assistance because of suspected baseline learning disability  Patient noted he likely has one         2  Patient will complete open conversation with correct word formulation and sequence 80% of the time in order to maximize functional communication      Long Term Goals:     1  Patient will independently utilize speech intelligibility strategies (e g , over-exaggeration, slow rate, breath groups, etc ) during oral reading tasks >90% intelligibility to facilitate increased generalization of skills into conversational speech by discharge        2  Patient will improve the ability to facilitate functional speech production skills for intelligible communication including compensatory strategies to promote quality of life and to maximize level of independence with communication       Plan:  -Continue with current plan of care     MPT, repetition, conversation practice in and outside of therapy room, /f/ words/sentences, tapping as needed  practie outside discussion, word retrieval exercises

## 2022-06-28 ENCOUNTER — OFFICE VISIT (OUTPATIENT)
Dept: OCCUPATIONAL THERAPY | Facility: CLINIC | Age: 57
End: 2022-06-28
Payer: MEDICARE

## 2022-06-28 ENCOUNTER — OFFICE VISIT (OUTPATIENT)
Dept: SPEECH THERAPY | Facility: CLINIC | Age: 57
End: 2022-06-28
Payer: MEDICARE

## 2022-06-28 DIAGNOSIS — I63.9 CEREBROVASCULAR ACCIDENT (CVA), UNSPECIFIED MECHANISM (HCC): ICD-10-CM

## 2022-06-28 DIAGNOSIS — R47.1 DYSARTHRIA: Primary | ICD-10-CM

## 2022-06-28 DIAGNOSIS — G81.91 HEMIPLEGIA AFFECTING RIGHT DOMINANT SIDE, UNSPECIFIED ETIOLOGY, UNSPECIFIED HEMIPLEGIA TYPE (HCC): Primary | ICD-10-CM

## 2022-06-28 DIAGNOSIS — R47.01 APHASIA: ICD-10-CM

## 2022-06-28 PROCEDURE — 92507 TX SP LANG VOICE COMM INDIV: CPT

## 2022-06-28 PROCEDURE — 97112 NEUROMUSCULAR REEDUCATION: CPT

## 2022-06-28 PROCEDURE — 97110 THERAPEUTIC EXERCISES: CPT

## 2022-06-28 NOTE — PROGRESS NOTES
Daily Speech Treatment Note    Today's date: 2022  Patients name: Karla Johnson  : 1965  MRN: [id number]  Safety measures:   Referring provider: Luis Miguel Martinez MD    Encounter Diagnosis     ICD-10-CM    1  Dysarthria  R47 1    2  Cerebrovascular accident (CVA), unspecified mechanism ([facility] [address] )  I63 9    3  Aphasia  R47 01        Visit Trackin  34 sessions or 0 Recert    Subjective/Behavioral:  - Pleasant/Cooperative    Objective/Assessment:  Completed structured activities with patient to target goals below  Results as stated below  Short-term goals:    1  Patient will complete word finding exercises to increase verbal thought organization in structured activities at 80% accuracy  : : 60% accuracy in stating concrete category group, minimal verbal cues for increased detail or specific answer increased to 100%  90% accuracy in naming quality of category list of 3 members  Completing list with similar item: 80% accuracy  73% choosing word that doesn't below amongst others, required more assistance because of suspected baseline learning disability  Patient noted he likely has one  : 65% accuracy with responsive naming, increased to 90% with minimal verbal cues  Description of list of words: 80% with minimal cues  Synonyms: 80% accuracy, opposites: 100% accuracy           2  Patient will complete open conversation with correct word formulation and sequence 80% of the time in order to maximize functional communication    : Participating in conversation with occasional cues to use slow rate and overarticulate to increase intelligibility        Long Term Goals:     1  Patient will independently utilize speech intelligibility strategies (e g , over-exaggeration, slow rate, breath groups, etc ) during oral reading tasks >90% intelligibility to facilitate increased generalization of skills into conversational speech by discharge        2  Patient will improve the ability to facilitate functional speech production skills for intelligible communication including compensatory strategies to promote quality of life and to maximize level of independence with communication       Plan:  -Continue with current plan of care  MPT, repetition, conversation practice in and outside of therapy room, /f/ words/sentences, tapping as needed  practie outside discussion, word retrieval exercises

## 2022-06-28 NOTE — PROGRESS NOTES
Daily Note     Today's date: 2022  Patient name: Yousif Mohr  : 1965  MRN: [id number]  Referring provider: Dauna Landau, MD  Dx:   Encounter Diagnosis     ICD-10-CM    1  Hemiplegia affecting right dominant side, unspecified etiology, unspecified hemiplegia type ([facility] [address] )  G81 91        Start Time: 1500  Stop Time: 1545  Total time in clinic (min): 45 minutes    Subjective: pt without complaints     Objective: Focus of session on proximal strength, distal strength and coordination, prehension skills and IADLs of increasing participation in leisure activities to assist with improving mental health  Ther Ex/ strengthening: See Treatment Diary Below       NMRE/ [address] [place]: pt able to use R hand 3 jaw akira to manipulate blue clothespin to  small items from tabletop surface and place in narrow mouthed container on tabletop surface with G accuracy and then into narrow mouthed container on OH surface with G accuracy and fair proximal strength to sustain sh flexion during OH reach  Pt did require rest breaks throughout task  Pt then engaged in [address] task to improve motor control and prehension skills of R dominant UE/ hand  Pt able to  small bolt, screw and nuts with R hand with min dfficulty, and then assemble in order into board with mod difficulty with bringing items from palm to fingertips to assemble on board  Pt dropped <10%, required increased time to complete  Pt able to  small bolts and nuts and hold 3-4 in palm of hand while bringing one at a time to fingertips and place on designated target  pt with G+ accuracy placing on medium sized target and G- accuracy placing on small sized target, required increased time  HEP: Issued yellow theraputty HEP ; issued goal of completing daily physical exercise for 30 min, spending 1 hour daily in the garage on woodworking and completing OT/PT/ST HEPs   On , Issued orange theraband HEP, pt reporting he is completing exercises at home  DATE 5/24 6/9/22 6/16 6/22 6/28           Neuro Re-Ed  Standing exercises focusing on single leg deadlifts and single leg mini squats, 5 reps x 4, with mod manual cues              Manual                        Ther Ex        Deadbug     5 reps x2  each side; then 5 reps x 2 with orange band around foot      Supermans    5 reps x 3     Bird Dog in Quadriped    5 each side with 5 sec hold     Modified V-ups    5 reps x 2     STS into toe-ups    5 reps x 2     Elevator squats with 1 sec hold    5 reps x 4             Theraband Exercises: [employer]     Chest press 10 reps x 2 12 reps x 2    12 reps x 3  12 reps x 2       Chest pulls 10 reps x 2 12 reps x 2   12 reps x 3  12 reps x 2     banded Rows  10 reps x 2 12 reps x 2  12 reps x 3 12 reps x 2     Band Sh ER 10 reps x 2 (no band) 12 reps x 2 with 1# dumbbell and not band 5 reps without band, then 5 reps x 3 with orange band; pt with weakness in R sh ER     Band Sh IR  12 reps x 2       Band Sh flex 10 reps x 2 12 reps x 2   12 reps x 3      Sh Abduction 10 reps x 2 12 reps x 2  12 reps x 3      Bicep curls  10 reps x 2  12 reps x 2      Tricep extension 10 reps x 2   12 reps x 2      Squat Chops         Anti-rotation core with band         UBE    4 min prograde and 4 min retrograde, 1 0 resistance; 1 min rest break 4 min prograde and 4 min retrograde, 1 0 resistance   PowerWeb        Ther Activity        Clips w  fx'l reach                        Theraputty                               Assessment: pt with G participation and G ricky to activity  Would benefit from cont OT sessions     Plan: Continue per plan of care            INTERVENTION COMMENTS:  Diagnosis: CVA  Precautions: depression, falls   FOTO: done 5/17  Insurance: Payor: Luciana Whitmore / Plan: MEDICARE A AND B / Product Type: Medicare A & B Fee for Service /   Visit: 22  PN due 7/5/22

## 2022-07-05 ENCOUNTER — OFFICE VISIT (OUTPATIENT)
Dept: OCCUPATIONAL THERAPY | Facility: CLINIC | Age: 57
End: 2022-07-05
Payer: MEDICARE

## 2022-07-05 ENCOUNTER — OFFICE VISIT (OUTPATIENT)
Dept: SPEECH THERAPY | Facility: CLINIC | Age: 57
End: 2022-07-05
Payer: MEDICARE

## 2022-07-05 DIAGNOSIS — R47.1 DYSARTHRIA: Primary | ICD-10-CM

## 2022-07-05 DIAGNOSIS — G81.91 HEMIPLEGIA AFFECTING RIGHT DOMINANT SIDE, UNSPECIFIED ETIOLOGY, UNSPECIFIED HEMIPLEGIA TYPE (HCC): Primary | ICD-10-CM

## 2022-07-05 DIAGNOSIS — I63.9 CEREBROVASCULAR ACCIDENT (CVA), UNSPECIFIED MECHANISM (HCC): ICD-10-CM

## 2022-07-05 PROCEDURE — 92507 TX SP LANG VOICE COMM INDIV: CPT

## 2022-07-05 PROCEDURE — 97110 THERAPEUTIC EXERCISES: CPT

## 2022-07-05 NOTE — PROGRESS NOTES
Daily Speech Treatment Note    Today's date: 2022  Patients name: Bonifacio Vital  : 1965  MRN: [id number]  Safety measures:   Referring provider: Lan Aggarwal MD    Encounter Diagnosis     ICD-10-CM    1  Dysarthria  R47 1    2  Cerebrovascular accident (CVA), unspecified mechanism ([facility] [address] )  I63 9        Visit Trackin  38 sessions or  Recert    Subjective/Behavioral:  - Pleasant/Cooperative    Objective/Assessment:  Completed structured activities with patient to target goals below  Results as stated below  Short-term goals:    1  Patient will complete word finding exercises to increase verbal thought organization in structured activities at 80% accuracy  : : 60% accuracy in stating concrete category group, minimal verbal cues for increased detail or specific answer increased to 100%  90% accuracy in naming quality of category list of 3 members  Completing list with similar item: 80% accuracy  73% choosing word that doesn't below amongst others, required more assistance because of suspected baseline learning disability  Patient noted he likely has one  : 65% accuracy with responsive naming, increased to 90% with minimal verbal cues  Description of list of words: 80% with minimal cues  Synonyms: 80% accuracy, opposites: 100% accuracy  : Verbal formulation of picture similarities and differences: Needed assistance with providing a 2nd answer for each           2  Patient will complete open conversation with correct word formulation and sequence 80% of the time in order to maximize functional communication  : Participating in conversation with occasional cues to use slow rate and overarticulate to increase intelligibility   : Min cues to slow down  And overarticulate to conversation        Long Term Goals:     1  Patient will independently utilize speech intelligibility strategies (e g , over-exaggeration, slow rate, breath groups, etc ) during oral reading tasks >90% intelligibility to facilitate increased generalization of skills into conversational speech by discharge        2  Patient will improve the ability to facilitate functional speech production skills for intelligible communication including compensatory strategies to promote quality of life and to maximize level of independence with communication       Plan:  -Continue with current plan of care  MPT, repetition, conversation practice in and outside of therapy room, /f/ words/sentences, tapping as needed  practie outside discussion, word retrieval exercises

## 2022-07-05 NOTE — PROGRESS NOTES
OCCUPATIONAL THERAPY DISCHARGE    7/5/22  Erminia Boxer  1965  [id number]  Sita Barba MD  CVA    Assessment/Plan    Skilled Analysis:  Pt is a 65 y o  male referred to Occupational Therapy s/p CVA  Pt has been seen in skilled OT for 23 visits  Pt has demonstrated improved BUE strength, with LUE at 5/5 and RUE at 5/5 throughout, except for ER/IR  Pt with improved [address] [place], demo improved completion times with 9 hole peg and keyhole peg tests  Pt has been provided an HEP for hand strength/ coordination but is inconsistent with HEP completion  Pt has been actively participating in skilled OT sessions for 45 min with minimal rest breaks, ricky well  Pt reporting he has not begun any woodworking or engagement in leisure activities, stating he isn't ready to do that yet  Pt has been engaging in lawn care and yard work tasks  Pt does require some A with LB ADL of dressing, requiring same level of assist as on day of eval  Pt's S O  does assist with ADL completion  Pt had a  assessment at [facility], with recommendations for a L foot brake/ gas control  Pt is following up with [facility] for return to driving  Pt unable to state any other goals he wishes to work on in [facility] and that he is ready for dc  Pt has achieved max benefit of skilled OT at this time  Discharge OT at this time               Short Term Goals (to be achieved within 4 weeks):  - Complete FMC testing- MET  - Pt will be able to complete LBD of donning shoes by self, with min increase time and a/e as needed- PARTIALLY MET   - Pt will be able to don R MAFO independently- NOT MET   - Pt will demo G carryover of Home Exercise Program to improve functional progression towards goals in Plan of care and for improved functional use of RUE in all daily tasks- INCONSISTENT-PARTIALLY MET   - Pt will demo with G tolerance to supine, seated, and in stance exercise x 45 minutes with minimal rest breaks required for increased engagement in weekly exercise regimen and increased engagement in life roles- MET   - Pt will increase R UE strength to 5/5, through the use of strengthening exercises and home program for engagement in all daily and leisure activities- PARTIALLY MET  - Pt will engage in 1-2 leisure tasks per week to improve carryover of skills practiced in the clinic, as well as to assist with return to engagement in leisure activities- NOT MET        Long Term Goals (12 weeks):  1  Pt will improve RUE rate of manipulation for all FM tests for improved functional performance/independence with daily and leisure tasks x 25%-  MET   2  Complete assessments to determine eligibility to return to driving; identify any modifications needed for return to driving- MET  3  Pt will be I with carryover of HEP to maintain strength and functional use of RUE- PARTIALLY MET       Subjective    SUBJECTIVE:  Pt reporting he has a riding mower and that he was mowing the lawn this weekend  PATIENT GOAL: to be able to put his pants, shoes and socks on by himself, to be able to mow his lawn again, to use his right hand to work on house projects/ [employer]on on small motors, to return to driving  HISTORY OF PRESENT ILLNESS:     Pt is a 65 y o  male who was referred to Occupational Therapy following CVA  Pt admitted to the hospital in Dec 2021 with right side weakness and aphasia  MRI (+) acute stroke in right internal capsule and right pontine areas  Pt went to short term rehab prior to dc home with NATALY DUCKWORTH , who provides support with daily tasks         PMH:   Past Medical History:   Diagnosis Date    Allergic rhinitis     last assessed 5/15/12; resolved 9/17/14    Anxiety     Bell's palsy     last assessed 10/2/15; resolved 10/2/15    Depression     DJD (degenerative joint disease)     Fracture lumbar vertebra-closed ([facility] [address] )     last assessed 12/30/14; resolved 12/30/14    Gout     Hypertension     Knee osteoarthritis     Malignant renovascular hypertension Pain Levels:  No reports of pain prior to and during eval      Objective    Impairments:     AROM:  LUE: WNL into all planes  RUE: WNL into all planes    MMT LUE on 7/5/22: Sh flex: 5/5  Sh Abd: 5/5  Sh IR: 5/5  Sh ER: 5/5   Elbow flex: 5/5  Elbow Ext: 5/5   Wrist Flex: 5/5  Wrist Ext: 5/5  Finger Flex: 5/5   Finger Ext: 5/5             MMT  3/3 4/26 6/7 7/5    Affected UE RUE  RUE RUE RUE            Sh flex 4-/5 4/5 5/5 5/5    Sh Abd 4+/5 4+/5 5/5 5/5    Sh IR 4+/5 4+/5 4/5 4/5    Sh ER 4+/5 4+/5 4/5 4/5    Elbow Flex 5/5 5/5 5/5 5/5    Elbow Ext 4+/5 5/5 5/5 5/5    Wrist Flex 5/5 5/5 5/5 5/5    Wrist Ext 5/5 5/5 5/5 5/5    Finger Flex 4+/5 5/5 5/5 5/5    Finger Ext 4+/5 5/5 5/5 5/5                            DATE 3/8 4/26 6/7 7/5            Testing:        Dynamometer L: 74  R: 70 L: 66  R: 55 L: 76  R: 57 L: 72  R: 65    Pinchmeter: pincer L: 15  R: 14 L: 16  R: 11 L: 12  R: 12 L: 14  R: 11    Pinchmeter: 3 jaw akira L: 19  R: 21 L:20  R: 21 L: 19  R: 21 L: 20  R: 20    Pinchmeter: lateral  L: 25  R: 24 L: 20  R: 23 L: 21  R: 22 L: 21  R: 25            9 hole Peg L: 25 86 sec  R: 29 69 sec  L: 28 sec  R: 54 sec L: 27 sec  R: 31 sec  L: 27 sec  R: 30 sec            Keyhole Peg L: 1 min 26 sec  R: 1 min 41 sec L: 1 min 57 sec  R: 2 min 31 sec L: 1 min 32 sec   R: 1 min 57 sec  L : 1 min 23 sec   R: 1 min 53 sec                                  Sensation:   Light Touch: Intact  Hot/cold: Intact     Functional cognition:   Attention: decreased sustained attn to task; easily distracted by external stim; difficulty with divided attn  Grossly intact for STM, LTM    Vision:   ROM: Intact  Tracking: Intact  Saccades: Intact   Visual Fields: Intact     ACTIVITIES OF DAILY LIVING:  Bathing: I, stands to shower but does have a shower seat   UBD: I  LBD: requires A to don B shoes and socks, occasional A to thread RLE into pants; pt reporting he has not been wearing the R MAFO, however he does require A to don MAFO  Pt encouraged to wear MAFO daily  HEP: Issued yellow theraputty HEP 5/12; issued goal of completing daily physical exercise for 30 min and spending 1 hour daily in the garage on woodworking and completing OT/PT/ST HEPs  Pt inconsistent with completion of HEP  Today's Session:  Pt completed UBE 5 min prograde and 5 min retrograde, without a rest break, for improved UE strength, proximal stability and muscle endurance  Pt ricky well  Pt reporting he had his  assessment with [facility]  It was recommended that pt have a left foot brake/ gas pedal, as he is not safe to operate a vehicle with his RLE  Pt stating he thinks he can drive fine with his RLE and was upset over the recommendation  Reinforced to pt that he should not be driving, ángel when it was recommended that he need adaptations to his vehicle in order to operate it safely  Pt stating he understood and will be following up with [facility]         OTHER PLANNED THERAPY INTERVENTIONS:   Supine, seated, and in stance neuro re-ed  FMC/prehension  Timed Trials  Manual tx  Hand to target  Sensory re-ed  Seated functional reach: crossing midline  Supine place and hold   WBearing strategies   Closed chain activities  Open chain activities      INTERVENTION COMMENTS:  Diagnosis: CVA  Precautions:  FOTO:  Insurance: Payor: Agatha Duran / Plan: MEDICARE A AND B / Product Type: Medicare A & B Fee for Service /   Visit: 23

## 2022-07-06 ENCOUNTER — TELEPHONE (OUTPATIENT)
Dept: FAMILY MEDICINE CLINIC | Facility: HOSPITAL | Age: 57
End: 2022-07-06

## 2022-07-06 DIAGNOSIS — G81.91 HEMIPLEGIA AFFECTING RIGHT DOMINANT SIDE, UNSPECIFIED ETIOLOGY, UNSPECIFIED HEMIPLEGIA TYPE (HCC): Primary | ICD-10-CM

## 2022-07-06 NOTE — TELEPHONE ENCOUNTER
Message from Batsheva Francis asking for new script for O T  janettal to be faxed to [phone]    Questions, call [phone]

## 2022-07-07 ENCOUNTER — APPOINTMENT (OUTPATIENT)
Dept: OCCUPATIONAL THERAPY | Facility: CLINIC | Age: 57
End: 2022-07-07
Payer: MEDICARE

## 2022-07-07 ENCOUNTER — OFFICE VISIT (OUTPATIENT)
Dept: SPEECH THERAPY | Facility: CLINIC | Age: 57
End: 2022-07-07
Payer: MEDICARE

## 2022-07-07 DIAGNOSIS — R47.1 DYSARTHRIA: Primary | ICD-10-CM

## 2022-07-07 DIAGNOSIS — I63.9 CEREBROVASCULAR ACCIDENT (CVA), UNSPECIFIED MECHANISM (HCC): ICD-10-CM

## 2022-07-07 DIAGNOSIS — R47.01 APHASIA: ICD-10-CM

## 2022-07-07 PROCEDURE — 92507 TX SP LANG VOICE COMM INDIV: CPT

## 2022-07-07 NOTE — PROGRESS NOTES
Speech-Language Pathology Discharge    Today's date: 2022  Patients name: Freddie Burger  : 1965  MRN: [id number]  Safety measures: Patient uses a cane for walking  Referring provider: Ernesto Summers MD    Encounter Diagnosis     ICD-10-CM    1  Dysarthria  R47 1    2  Cerebrovascular accident (CVA), unspecified mechanism ([facility] [address] )  I63 9    3  Aphasia  R47 01          Visit Trackin     Subjective comments: Patient arrived ~15 min late to session  Patient is reporting that he is doing well with communication  Patient discharged from PT and is seeing OT at another facility  Patient's goal(s): "Walk better" (from previous evaluation on 22)      Goals  Short-term goals:    1  Patient will complete word finding exercises to increase verbal thought organization in structured activities at 80% accuracy  : : 60% accuracy in stating concrete category group, minimal verbal cues for increased detail or specific answer increased to 100%  90% accuracy in naming quality of category list of 3 members  Completing list with similar item: 80% accuracy  73% choosing word that doesn't below amongst others, required more assistance because of suspected baseline learning disability  Patient noted he likely has one  : 65% accuracy with responsive naming, increased to 90% with minimal verbal cues  Description of list of words: 80% with minimal cues  Synonyms: 80% accuracy, opposites: 100% accuracy  : Verbal formulation of picture similarities and differences: Needed assistance with providing a 2nd answer for each   Completed word association task, was able to formulate 10 words associated with target words with 100% accuracy with min cues  GOAL MET     2  Patient will complete open conversation with correct word formulation and sequence 80% of the time in order to maximize functional communication    : Participating in conversation with occasional cues to use slow rate and overarticulate to increase intelligibility  7/5: Min cues to slow down  And overarticulate to conversation  7/7 Patient participated in conversation with min cues to slow rate and overarticulate  Patient was 90% intelligible with implementation of strategies  GOAL MET     Long Term Goals:     1  Patient will independently utilize speech intelligibility strategies (e g , over-exaggeration, slow rate, breath groups, etc ) during oral reading tasks >90% intelligibility to facilitate increased generalization of skills into conversational speech by discharge   GOAL MET     2  Patient will improve the ability to facilitate functional speech production skills for intelligible communication including compensatory strategies to promote quality of life and to maximize level of independence with communication   GOAL MET    Impressions/Recommendations    Impressions:   -Patient presents with mild dysarthria characterized by imprecise articulation and fast rate of speech, as well as mild dysnomia  Patient has completed 32 sessions of speech/language interventions focused on improving intelligibility and ability to participate in conversation  Patient has made significant improvements and reports feeling confident in his abilities to communicate and implement given strategies independently  Discussed progress of POC with patient and recommended discharge  Patient was agreeable and was discharged from speech therapy today  Advised patient to call with any questions or concerns  Recommendations:  -Patient to be discharged from outpatient skilled Speech Therapy services: Patient achieved all goals in plan of care

## 2022-07-12 ENCOUNTER — APPOINTMENT (OUTPATIENT)
Dept: SPEECH THERAPY | Facility: CLINIC | Age: 57
End: 2022-07-12
Payer: MEDICARE

## 2022-07-12 ENCOUNTER — APPOINTMENT (OUTPATIENT)
Dept: OCCUPATIONAL THERAPY | Facility: CLINIC | Age: 57
End: 2022-07-12
Payer: MEDICARE

## 2022-07-25 ENCOUNTER — RA CDI HCC (OUTPATIENT)
Dept: OTHER | Facility: HOSPITAL | Age: 57
End: 2022-07-25

## 2022-07-25 NOTE — PROGRESS NOTES
[facility] [address]  coding opportunities       Chart reviewed, no opportunity found: CHART REVIEWED, NO OPPORTUNITY FOUND        Patients Insurance     Medicare Insurance: Medicare

## 2022-08-01 ENCOUNTER — OFFICE VISIT (OUTPATIENT)
Dept: FAMILY MEDICINE CLINIC | Facility: HOSPITAL | Age: 57
End: 2022-08-01
Payer: MEDICARE

## 2022-08-01 VITALS
SYSTOLIC BLOOD PRESSURE: 118 MMHG | HEIGHT: 63 IN | DIASTOLIC BLOOD PRESSURE: 80 MMHG | HEART RATE: 72 BPM | TEMPERATURE: 96.4 F | BODY MASS INDEX: 33.24 KG/M2 | WEIGHT: 187.6 LBS

## 2022-08-01 DIAGNOSIS — I63.9 ACUTE CVA (CEREBROVASCULAR ACCIDENT) (HCC): ICD-10-CM

## 2022-08-01 DIAGNOSIS — M47.896 OTHER OSTEOARTHRITIS OF SPINE, LUMBAR REGION: ICD-10-CM

## 2022-08-01 DIAGNOSIS — G81.91 HEMIPLEGIA AFFECTING RIGHT DOMINANT SIDE, UNSPECIFIED ETIOLOGY, UNSPECIFIED HEMIPLEGIA TYPE (HCC): ICD-10-CM

## 2022-08-01 DIAGNOSIS — I69.90 LATE EFFECTS OF CVA (CEREBROVASCULAR ACCIDENT): Primary | ICD-10-CM

## 2022-08-01 DIAGNOSIS — I10 ESSENTIAL HYPERTENSION: ICD-10-CM

## 2022-08-01 DIAGNOSIS — N52.9 ERECTILE DYSFUNCTION, UNSPECIFIED ERECTILE DYSFUNCTION TYPE: ICD-10-CM

## 2022-08-01 DIAGNOSIS — M1A.00X0 IDIOPATHIC CHRONIC GOUT WITHOUT TOPHUS, UNSPECIFIED SITE: ICD-10-CM

## 2022-08-01 PROBLEM — E78.2 MIXED HYPERLIPIDEMIA: Status: ACTIVE | Noted: 2017-05-10

## 2022-08-01 PROCEDURE — 99214 OFFICE O/P EST MOD 30 MIN: CPT | Performed by: FAMILY MEDICINE

## 2022-08-01 RX ORDER — PRAVASTATIN SODIUM 10 MG
10 TABLET ORAL
Qty: 90 TABLET | Refills: 1 | Status: SHIPPED | OUTPATIENT
Start: 2022-08-01

## 2022-08-01 RX ORDER — LISINOPRIL 40 MG/1
40 TABLET ORAL DAILY
Qty: 90 TABLET | Refills: 1 | Status: SHIPPED | OUTPATIENT
Start: 2022-08-01

## 2022-08-01 RX ORDER — DULOXETIN HYDROCHLORIDE 30 MG/1
30 CAPSULE, DELAYED RELEASE ORAL DAILY
Qty: 90 CAPSULE | Refills: 1 | Status: SHIPPED | OUTPATIENT
Start: 2022-08-01

## 2022-08-01 RX ORDER — SILDENAFIL 50 MG/1
50 TABLET, FILM COATED ORAL DAILY PRN
Qty: 10 TABLET | Refills: 0 | Status: SHIPPED | OUTPATIENT
Start: 2022-08-01 | End: 2022-08-22 | Stop reason: SDUPTHER

## 2022-08-01 RX ORDER — AMLODIPINE BESYLATE 10 MG/1
10 TABLET ORAL DAILY
Qty: 90 TABLET | Refills: 1 | Status: SHIPPED | OUTPATIENT
Start: 2022-08-01

## 2022-08-01 RX ORDER — ALLOPURINOL 100 MG/1
100 TABLET ORAL DAILY
Qty: 90 TABLET | Refills: 1 | Status: SHIPPED | OUTPATIENT
Start: 2022-08-01

## 2022-08-03 NOTE — PROGRESS NOTES
Assessment/Plan:      Problem List Items Addressed This Visit        Cardiovascular and Mediastinum    Benign essential hypertension    Relevant Medications    amLODIPine (NORVASC) 10 mg tablet    pravastatin (PRAVACHOL) 10 mg tablet    lisinopril (ZESTRIL) 40 mg tablet       Nervous and Auditory    Hemiplegia affecting right dominant side, unspecified etiology, unspecified hemiplegia type (HCC)       Musculoskeletal and Integument    Degenerative joint disease (DJD) of lumbar spine    Relevant Medications    DULoxetine (CYMBALTA) 30 mg delayed release capsule       Other    Idiopathic chronic gout    Relevant Medications    allopurinol (ZYLOPRIM) 100 mg tablet      Other Visit Diagnoses     Late effects of CVA (cerebrovascular accident)    -  Primary    Relevant Medications    pravastatin (PRAVACHOL) 10 mg tablet    Acute CVA (cerebrovascular accident) ([facility] [address] )        Erectile dysfunction, unspecified erectile dysfunction type        Relevant Medications    sildenafil (VIAGRA) 50 MG tablet           Plan/Discussion:    Overall Dillon Wells is doing well  He continues to improve through therapy  He is gaining more range of motion and strength on the right side  Review driving consultation  This was done through occupational therapy at [facility]  He is unable to drive safely using the right foot and is working on the left  However he is committed to working on strengthening the right side further  At this time he should not be driving due to the right triston plegia  [employer] forms were completed and faxed  New    Chronic conditions otherwise are reviewed and are stable  Medications are reviewed  Erectile dysfunction  Discussed treatment options  Trial of Viagra  May need Urology consultation  Subjective:   Chief Complaint   Patient presents with    Follow-up        Patient ID: Erminia Boxer is a 65 y o  male  Patient is here for follow-up of his chronic conditions    Patient continues to work with occupational, physical therapy, speech therapy regarding deficits from relatively recent CVA  Mostly right-sided hemiplegia  Since the last visit he has also gone through 's evaluation through Occupational therapy  Aside from right-sided deficits he is able to drive  He is advised to learn and have adaptive equipment to be able to drive using the left foot  However he is committed to strengthening the right side  Since the last visit he has gained increased strength and mobility of his right foot and right lower extremity  Today he also has asking about erectile dysfunction  He has not been able to have an erection  No morning erections  Sexual drive is present but decreased  He is asking to have a trial of Viagra  The following portions of the patient's history were reviewed and updated as appropriate: allergies, current medications, past family history, past medical history, past social history, past surgical history and problem list     Review of Systems   Constitutional: Negative  Negative for activity change, appetite change, chills and diaphoresis  HENT: Negative for congestion and dental problem  Respiratory: Negative  Negative for apnea, chest tightness, shortness of breath and wheezing  Cardiovascular: Negative  Negative for chest pain, palpitations and leg swelling  Gastrointestinal: Negative  Negative for abdominal distention, abdominal pain, constipation, diarrhea and nausea  Genitourinary: Negative  Negative for difficulty urinating, dysuria and frequency           Objective:  Vitals:    08/01/22 1126   BP: 118/80   Pulse: 72   Temp: (!) 96 4 °F (35 8 °C)   Weight: 85 1 kg (187 lb 9 6 oz)   Height: 5' 3" (1 6 m)     BP Readings from Last 6 Encounters:   08/01/22 118/80   05/02/22 130/82   02/02/22 138/80   01/29/22 121/77   01/05/22 122/70   12/13/21 (!) 176/97      Wt Readings from Last 6 Encounters:   08/01/22 85 1 kg (187 lb 9 6 oz)   05/02/22 88 5 kg (195 lb 3 2 oz)   01/05/22 78 7 kg (173 lb 6 4 oz)   12/09/21 77 1 kg (170 lb)   12/08/21 77 1 kg (170 lb)   11/07/19 77 1 kg (170 lb)             Physical Exam  Vitals and nursing note reviewed  Constitutional:       General: He is not in acute distress  Appearance: Normal appearance  He is well-developed  He is not ill-appearing  HENT:      Head: Normocephalic and atraumatic  Right Ear: External ear normal       Left Ear: External ear normal       Nose: Nose normal  No congestion or rhinorrhea  Mouth/Throat:      Mouth: Mucous membranes are moist       Pharynx: No oropharyngeal exudate or posterior oropharyngeal erythema  Eyes:      Extraocular Movements: Extraocular movements intact  Conjunctiva/sclera: Conjunctivae normal       Pupils: Pupils are equal, round, and reactive to light  Cardiovascular:      Rate and Rhythm: Normal rate and regular rhythm  Heart sounds: Normal heart sounds  No murmur heard  No friction rub  No gallop  Pulmonary:      Effort: Pulmonary effort is normal  No respiratory distress  Breath sounds: Normal breath sounds  No wheezing or rales  Chest:      Chest wall: No tenderness  Abdominal:      General: Bowel sounds are normal  There is no distension  Palpations: Abdomen is soft  There is no mass  Tenderness: There is no abdominal tenderness  There is no guarding or rebound  Musculoskeletal:         General: Normal range of motion  Cervical back: Normal range of motion and neck supple  Skin:     General: Skin is warm  Capillary Refill: Capillary refill takes less than 2 seconds  Neurological:      Mental Status: He is alert and oriented to person, place, and time  Comments: Right-sided hemiplegia  Able to move right foot up and down    This is new since the last visit   Psychiatric:         Mood and Affect: Mood normal          Behavior: Behavior normal

## 2022-08-16 ENCOUNTER — OFFICE VISIT (OUTPATIENT)
Dept: SLEEP CENTER | Facility: HOSPITAL | Age: 57
End: 2022-08-16
Payer: MEDICARE

## 2022-08-16 VITALS
WEIGHT: 183 LBS | DIASTOLIC BLOOD PRESSURE: 74 MMHG | OXYGEN SATURATION: 96 % | SYSTOLIC BLOOD PRESSURE: 110 MMHG | HEIGHT: 63 IN | BODY MASS INDEX: 32.43 KG/M2 | HEART RATE: 69 BPM

## 2022-08-16 DIAGNOSIS — F32.A ANXIETY AND DEPRESSION: ICD-10-CM

## 2022-08-16 DIAGNOSIS — E66.9 OBESITY (BMI 30-39.9): ICD-10-CM

## 2022-08-16 DIAGNOSIS — F41.9 ANXIETY AND DEPRESSION: ICD-10-CM

## 2022-08-16 DIAGNOSIS — I10 ESSENTIAL HYPERTENSION: ICD-10-CM

## 2022-08-16 DIAGNOSIS — G47.33 OSA (OBSTRUCTIVE SLEEP APNEA): Primary | ICD-10-CM

## 2022-08-16 DIAGNOSIS — Z86.73 HISTORY OF CVA (CEREBROVASCULAR ACCIDENT): ICD-10-CM

## 2022-08-16 DIAGNOSIS — G47.19 EXCESSIVE DAYTIME SLEEPINESS: ICD-10-CM

## 2022-08-16 PROCEDURE — 99204 OFFICE O/P NEW MOD 45 MIN: CPT | Performed by: INTERNAL MEDICINE

## 2022-08-16 NOTE — PROGRESS NOTES
Consultation - [address]  65 y o  male  :1965  UFW:[id number]  [facility]    Physician Requesting Consult: Aren Durant MD             Reason for Consult : At your kind request I saw Dali Sanchez for initial sleep evaluation today  Home sleep testing was undertaken to evaluate for sleep disordered breathing and patient is here accompanied by his significant other, to review results and further options  The study demonstrated : KEE (respiratory event index of) 23 7 /hour  Minimum oxygen saturation 79%  and 31% of total sleep time was spent with saturations less than 90%  The snore index was 28%  PFSH, Problem List, Medications & Allergies were reviewed in EMR  [facility]  has a past medical history of Acute ischemic stroke ([facility] [place] ) (2021), Allergic rhinitis, Anxiety, Bell's palsy, Depression, DJD (degenerative joint disease), Fracture lumbar vertebra-closed ([facility] [address] ), Gout, Hypertension, Knee osteoarthritis, and Malignant renovascular hypertension  He has a current medication list which includes the following prescription(s): allopurinol, amlodipine, aspirin, duloxetine, lisinopril, pravastatin, and sildenafil  HPI:  Study was undertaken because of bed partner is report of breathing pauses during sleep of at least 6 years duration  He also snores loudly  Symptoms are somewhat improved by sleeping in the prone position  Other Complaints:  Recent CVA in 2021  Restless Leg Syndrome: jerking movements during sleep  Parasomnia: no features reported    Sleep Routine (on average):   Typical Bedtime:  11:00 p m  Gets OOB:  8 - 9 a m  TIB:>9 hrs  Sleep latency:< 15 minutes Sleep Interruptions:2-/nite because of nocturia and able to fall back asleep  Awakens: spontaneously  Upon awakening: never feels rested   [facility] reports Excessive Daytime Sleepiness  feels like napping & is dozing off unintentionally    He rated himself at Total score: 15 /24 on the [place] Sleepiness Scale  Habits:  reports that he has never smoked  He has never used smokeless tobacco  ;   E-Cigarette/Vaping    ;  reports no history of drug use ;  reports previous alcohol use  ; Caffeine use:limited ; Exercise routine: none   Family History: Negative for sleep disturbance  ROS - reviewed and as attached  Significant for around 20 lb weight gain in the past few months  He reported no nasal symptoms  He reports frequent cough  He reported no cardiac symptoms  He has musculoskeletal aches and pains  He has feelings of anxiety and depression  EXAM:  /74 (BP Location: Right arm, Cuff Size: Adult)   Pulse 69   Ht 5' 3" (1 6 m)   Wt 83 kg (183 lb)   SpO2 96%   BMI 32 42 kg/m²    General: Well groomed male, well appearing, in no apparent distress  Coughed frequently during this encounter  Neurological: Alert and orientated;  cooperative; Cranial nerves intact;    Psychiatric: Speech:  Dysarthria;  Normal mood, affect & thought   Skin: warm and dry; Color& Hydration good; no facial rashes or lesions   HEENT:  Craniofacial anatomy: slight overjet  Sinuses: non- tender  TMJ: Normal    Eyes: EOM's intact;  conjunctiva/corneas clear   Ears: Externallyappear normal     Nasal Airway: narrow nares Septum:intact; Mucosa: normal; Turbinates: normal; Rhinorrhea: None   Mouth: Lips: normal posture; Dentition: normal   Mucosa:moist  ; Hard Palate:normal    Oropharryx: crowded and AP narrowing Tongue: Mallampati:Class IV and MobileSoft Palate:  redundant  and Uvular Hypertrophy Tonsils: absent  Neck:is thick; Neck Circumference: 16 "; Supple; no abnormal masses; Thyroid:normal  Trachea:central     Lymph: No Cervical or Submandibular Lymhadenopathy  Heart: S1,S2 normal; RRR; no gallop; no murmurs   Lungs: Respiratory Effort:normal  Air entry good bilaterally  No wheezes  No rales  Abdomen: Obese, Soft & non-tender    Extremities: No pedal edema  No clubbing or cyanosis  Musculoskeletal:  Motor normal; Gait:  Ambulant with a cane and Hemiplegic gait  IMPRESSION: Primary/Secondary Sleep Diagnoses (to Medical or Psych conditions) & Comorbidities   1  SHOSHANA (obstructive sleep apnea)  Ambulatory Referral to Sleep Medicine    CPAP Study   2  Excessive daytime sleepiness  CPAP Study   3  Anxiety and depression     4  Essential hypertension     5  History of CVA (cerebrovascular accident)  CPAP Study   6  Obesity (BMI 30-39  9)          PLAN:   1  I reviewed results of the Sleep study with the patient  2  With respect to above conditions, I counseled on pathophysiology, diagnosis, treatment options, risks and benefits; inter-relationship and effects on symptoms and comorbidities; risks of no treatment; costs and insurance aspects  3  Patient elected positive airway pressure therapy and is to be scheduled for a titration study  4  I also advised on weight reduction  5  It appears he will benefit from swallowing evaluation  6  Follow-up to be scheduled after the study to review results and to initiate therapy  Sincerely,      Authenticated electronically on 67/80/32   Board Certified Specialist     Portions of the record may have been created with voice recognition software  Occasional wrong word or "sound a like" substitutions may have occurred due to the inherent limitations of voice recognition software  There may also be notations and random deletions of words or characters from malfunctioning software  Read the chart carefully and recognize, using context, where substitutions/deletions have occurred

## 2022-08-16 NOTE — PATIENT INSTRUCTIONS
What is SHOSHANA? Obstructive sleep apnea is a common and serious sleep disorder that causes you to stop breathing during sleep  The airway repeatedly becomes blocked, limiting the amount of air that reaches your lungs  When this happens, you may snore loudly or making choking noises as you try to breathe  Your brain and body becomes oxygen deprived and you may wake up  This may happen a few times a night, or in more severe cases, several hundred times a night  Sleep apnea can make you wake up in the morning feeling tired or unrefreshed even though you have had a full night of sleep  During the day, you may feel fatigued, have difficulty concentrating or you may even unintentionally fall asleep  This is because your body is waking up numerous times throughout the night, even though you might not be conscious of each awakening  The lack of oxygen your body receives can have negative long-term consequences for your health  This includes:  High blood pressure  Heart disease  Irregular heart rhythms  Stroke  Pre-diabetes and diabetes  Depression    Testing  An objective evaluation of your sleep may be needed before your board certified sleep physician can make a diagnosis  Options include:   In-lab overnight sleep study  This type of sleep study requires you to stay overnight at a sleep center, in a bed that may resemble a hotel room  You will sleep with sensors hooked up to various parts of your body  These sensors record your brain waves, heartbeat, breathing and movement  An overnight sleep study also provides your doctor with the most complete information about your sleep  Learn more about an overnight sleep study  Home sleep apnea test  Some patients with high risk factors for obstructive sleep apnea and no other medical disorders may be candidates for a home sleep apnea test  The testing equipment differs in that it is less complicated than what is used in an overnight sleep study   As such, does not give all the data an in-lab will and if negative, may not mean you do not have the problem  Treatment for sleep apnea  includes using a continuous positive airway pressure (CPAP) machine to keep your airway open during sleep  A mask is placed over your nose and mouth, or just your nose  The mask is hooked to the CPAP machine that blows a gentle stream of air into the mask when you breathe  This helps keep your airway open so you can breathe more regularly  Extra oxygen may be given to you through the machine  You may be given a mouth device  It looks like a mouth guard or dental retainer and stops your tongue and mouth tissues from blocking your throat while you sleep  Surgery may be needed to remove extra tissues that block your mouth, throat, or nose  Manage sleep apnea:   Do not smoke  Nicotine and other chemicals in cigarettes and cigars can cause lung damage  Ask your healthcare provider for information if you currently smoke and need help to quit  E-cigarettes or smokeless tobacco still contain nicotine  Talk to your healthcare provider before you use these products  Do not drink alcohol or take sedative medicine before you go to sleep  Alcohol and sedatives can relax the muscles and tissues around your throat  This can block the airflow to your lungs  Maintain a healthy weight  Excess tissue around your throat may restrict your breathing  Ask your healthcare provider for information if you need to lose weight  Sleep on your side or use pillows designed to prevent sleep apnea  This prevents your tongue or other tissues from blocking your throat  You can also raise the head of your bed  Driving Safety  Refrain from driving when drowsy  Follow up with your healthcare provider as directed:  Write down your questions so you remember to ask them during your visits  Go to AASM website for more information: Sleepeducation  org     What is SHOSHANA?    Obstructive sleep apnea is a common and serious sleep disorder that causes you to stop breathing during sleep  The airway repeatedly becomes blocked, limiting the amount of air that reaches your lungs  When this happens, you may snore loudly or making choking noises as you try to breathe  Your brain and body becomes oxygen deprived and you may wake up  This may happen a few times a night, or in more severe cases, several hundred times a night  Sleep apnea can make you wake up in the morning feeling tired or unrefreshed even though you have had a full night of sleep  During the day, you may feel fatigued, have difficulty concentrating or you may even unintentionally fall asleep  This is because your body is waking up numerous times throughout the night, even though you might not be conscious of each awakening  The lack of oxygen your body receives can have negative long-term consequences for your health  This includes:  High blood pressure  Heart disease  Irregular heart rhythms  Stroke  Pre-diabetes and diabetes  Depression    Testing  An objective evaluation of your sleep may be needed before your board certified sleep physician can make a diagnosis  Options include:   In-lab overnight sleep study  This type of sleep study requires you to stay overnight at a sleep center, in a bed that may resemble a hotel room  You will sleep with sensors hooked up to various parts of your body  These sensors record your brain waves, heartbeat, breathing and movement  An overnight sleep study also provides your doctor with the most complete information about your sleep  Learn more about an overnight sleep study  Home sleep apnea test  Some patients with high risk factors for obstructive sleep apnea and no other medical disorders may be candidates for a home sleep apnea test  The testing equipment differs in that it is less complicated than what is used in an overnight sleep study   As such, does not give all the data an in-lab will and if negative, may not mean you do not have the problem  Treatment for sleep apnea  includes using a continuous positive airway pressure (CPAP) machine to keep your airway open during sleep  A mask is placed over your nose and mouth, or just your nose  The mask is hooked to the CPAP machine that blows a gentle stream of air into the mask when you breathe  This helps keep your airway open so you can breathe more regularly  Extra oxygen may be given to you through the machine  You may be given a mouth device  It looks like a mouth guard or dental retainer and stops your tongue and mouth tissues from blocking your throat while you sleep  Surgery may be needed to remove extra tissues that block your mouth, throat, or nose  Manage sleep apnea:   Do not smoke  Nicotine and other chemicals in cigarettes and cigars can cause lung damage  Ask your healthcare provider for information if you currently smoke and need help to quit  E-cigarettes or smokeless tobacco still contain nicotine  Talk to your healthcare provider before you use these products  Do not drink alcohol or take sedative medicine before you go to sleep  Alcohol and sedatives can relax the muscles and tissues around your throat  This can block the airflow to your lungs  Maintain a healthy weight  Excess tissue around your throat may restrict your breathing  Ask your healthcare provider for information if you need to lose weight  Sleep on your side or use pillows designed to prevent sleep apnea  This prevents your tongue or other tissues from blocking your throat  You can also raise the head of your bed  Driving Safety  Refrain from driving when drowsy  Follow up with your healthcare provider as directed:  Write down your questions so you remember to ask them during your visits  Go to AAS website for more information: Sleepeducation  org       Nursing Support:  When: Monday through Friday 7A-5PM except holidays  Where: Our direct line is [phone]      If you are having a true emergency please call 911  In the event that the line is busy or it is after hours please leave a voice message and we will return your call  Please speak clearly, leaving your full name, birth date, best number to reach you and the reason for your call  Medication refills: We will need the name of the medication, the dosage, the ordering provider, whether you get a 30 or 90 day refill, and the pharmacy name and address  Medications will be ordered by the provider only  Nurses cannot call in prescriptions  Please allow 7 days for medication refills  Physician requested updates: If your provider requested that you call with an update after starting medication, please be ready to provide us the medication and dosage, what time you take your medication, the time you attempt to fall asleep, time you fall asleep, when you wake up, and what time you get out of bed  Sleep Study Results: We will contact you with sleep study results and/or next steps after the physician has reviewed your testing

## 2022-08-16 NOTE — PROGRESS NOTES
Review of Systems      Genitourinary need to urinate more than twice a night and difficulty with erection   Cardiology none   Gastrointestinal none   Neurology need to move extremities, muscle weakness and balance problems   Constitutional fatigue   Integumentary none   Psychiatry anxiety, depression and mood change   Musculoskeletal joint pain, muscle aches, back pain and legs twitching/jerking   Pulmonary snoring   ENT none   Endocrine none   Hematological none

## 2022-08-22 DIAGNOSIS — N52.9 ERECTILE DYSFUNCTION, UNSPECIFIED ERECTILE DYSFUNCTION TYPE: ICD-10-CM

## 2022-08-22 RX ORDER — SILDENAFIL 50 MG/1
50 TABLET, FILM COATED ORAL DAILY PRN
Qty: 10 TABLET | Refills: 0 | Status: SHIPPED | OUTPATIENT
Start: 2022-08-22 | End: 2022-09-22

## 2022-09-22 DIAGNOSIS — N52.9 ERECTILE DYSFUNCTION, UNSPECIFIED ERECTILE DYSFUNCTION TYPE: ICD-10-CM

## 2022-09-22 RX ORDER — SILDENAFIL 50 MG/1
TABLET, FILM COATED ORAL
Qty: 10 TABLET | Refills: 0 | Status: SHIPPED | OUTPATIENT
Start: 2022-09-22 | End: 2022-10-18

## 2022-10-18 DIAGNOSIS — N52.9 ERECTILE DYSFUNCTION, UNSPECIFIED ERECTILE DYSFUNCTION TYPE: ICD-10-CM

## 2022-10-18 RX ORDER — SILDENAFIL 50 MG/1
TABLET, FILM COATED ORAL
Qty: 10 TABLET | Refills: 0 | Status: SHIPPED | OUTPATIENT
Start: 2022-10-18

## 2022-11-02 ENCOUNTER — RA CDI HCC (OUTPATIENT)
Dept: OTHER | Facility: HOSPITAL | Age: 57
End: 2022-11-02

## 2022-11-05 DIAGNOSIS — N52.9 ERECTILE DYSFUNCTION, UNSPECIFIED ERECTILE DYSFUNCTION TYPE: ICD-10-CM

## 2022-11-05 RX ORDER — SILDENAFIL 50 MG/1
TABLET, FILM COATED ORAL
Qty: 10 TABLET | Refills: 0 | Status: SHIPPED | OUTPATIENT
Start: 2022-11-05

## 2022-11-09 ENCOUNTER — OFFICE VISIT (OUTPATIENT)
Dept: FAMILY MEDICINE CLINIC | Facility: HOSPITAL | Age: 57
End: 2022-11-09

## 2022-11-09 VITALS
BODY MASS INDEX: 30.65 KG/M2 | DIASTOLIC BLOOD PRESSURE: 82 MMHG | HEIGHT: 63 IN | TEMPERATURE: 96.2 F | SYSTOLIC BLOOD PRESSURE: 118 MMHG | WEIGHT: 173 LBS | HEART RATE: 76 BPM

## 2022-11-09 DIAGNOSIS — I10 ESSENTIAL HYPERTENSION: ICD-10-CM

## 2022-11-09 DIAGNOSIS — G47.33 OSA (OBSTRUCTIVE SLEEP APNEA): ICD-10-CM

## 2022-11-09 DIAGNOSIS — Z23 ENCOUNTER FOR IMMUNIZATION: Primary | ICD-10-CM

## 2022-11-09 DIAGNOSIS — I69.90 LATE EFFECTS OF CVA (CEREBROVASCULAR ACCIDENT): ICD-10-CM

## 2022-11-09 DIAGNOSIS — M1A.00X0 IDIOPATHIC CHRONIC GOUT WITHOUT TOPHUS, UNSPECIFIED SITE: ICD-10-CM

## 2022-11-09 RX ORDER — METOPROLOL SUCCINATE 25 MG/1
TABLET, EXTENDED RELEASE ORAL
COMMUNITY
Start: 2022-10-18

## 2022-11-10 NOTE — PROGRESS NOTES
Name: Soraya Davis      : 1965      MRN: [id number]  Encounter Provider: Marta Neal MD  Encounter Date: 2022   Encounter department: Formerly named [facility]\""      1  Encounter for immunization  -     influenza vaccine, quadrivalent, recombinant, PF, 0 5 mL, for patients 18 yr+ (FLUBLOK)    2  Essential hypertension    3  Late effects of CVA (cerebrovascular accident)    4  SHOSHANA (obstructive sleep apnea)    5  Idiopathic chronic gout without tophus, unspecified site     overall Celso De Guzman has been doing well  Continues to improve following his CVA  Plans on retesting for driving without any resctriction or modification  He will be following up with sleep medicine regarding SHOSHANA  Otherwise chronic conditions are stable  No medication changes made today  Dillon Perkins is here for fu  Overall feeling better  Reports continued improvement of his right leg  Working on this to be able to drive again without any modification or restriction  He is working with sleep medicine regarding SHOSHANA  Needs to schedule cpap titration  Review of Systems   Constitutional: Negative  Negative for activity change, appetite change, chills and diaphoresis  HENT: Negative for congestion and dental problem  Respiratory: Negative  Negative for apnea, chest tightness, shortness of breath and wheezing  Cardiovascular: Negative  Negative for chest pain, palpitations and leg swelling  Gastrointestinal: Negative  Negative for abdominal distention, abdominal pain, constipation, diarrhea and nausea  Genitourinary: Negative  Negative for difficulty urinating, dysuria and frequency         Current Outpatient Medications on File Prior to Visit   Medication Sig   • allopurinol (ZYLOPRIM) 100 mg tablet Take 1 tablet (100 mg total) by mouth daily   • amLODIPine (NORVASC) 10 mg tablet Take 1 tablet (10 mg total) by mouth daily   • aspirin 81 mg chewable tablet Chew 1 tablet (81 mg total) daily   • DULoxetine (CYMBALTA) 30 mg delayed release capsule Take 1 capsule (30 mg total) by mouth daily   • lisinopril (ZESTRIL) 40 mg tablet Take 1 tablet (40 mg total) by mouth daily   • metoprolol succinate (TOPROL-XL) 25 mg 24 hr tablet TAKE 1 TABLET BY MOUTH ONCE DAILY  REPLACES METOPROLOL TARTRATE   • pravastatin (PRAVACHOL) 10 mg tablet Take 1 tablet (10 mg total) by mouth daily with dinner   • sildenafil (VIAGRA) 50 MG tablet TAKE 1 TABLET BY MOUTH ONCE DAILY AS NEEDED FOR ERECTILE DYSFUNCTION       Objective     /82   Pulse 76   Temp (!) 96 2 °F (35 7 °C)   Ht 5' 3" (1 6 m)   Wt 78 5 kg (173 lb)   BMI 30 65 kg/m²     Physical Exam  Vitals and nursing note reviewed  Constitutional:       General: He is not in acute distress  Appearance: Normal appearance  He is well-developed  He is not ill-appearing  HENT:      Head: Normocephalic and atraumatic  Right Ear: External ear normal       Left Ear: External ear normal       Nose: Nose normal  No congestion or rhinorrhea  Mouth/Throat:      Mouth: Mucous membranes are moist       Pharynx: No oropharyngeal exudate or posterior oropharyngeal erythema  Eyes:      Extraocular Movements: Extraocular movements intact  Conjunctiva/sclera: Conjunctivae normal       Pupils: Pupils are equal, round, and reactive to light  Cardiovascular:      Rate and Rhythm: Normal rate and regular rhythm  Heart sounds: Normal heart sounds  No murmur heard  No friction rub  No gallop  Pulmonary:      Effort: Pulmonary effort is normal  No respiratory distress  Breath sounds: Normal breath sounds  No wheezing or rales  Chest:      Chest wall: No tenderness  Abdominal:      General: Bowel sounds are normal  There is no distension  Palpations: Abdomen is soft  There is no mass  Tenderness: There is no abdominal tenderness  There is no guarding or rebound     Musculoskeletal: General: Normal range of motion  Cervical back: Normal range of motion and neck supple  Skin:     General: Skin is warm  Capillary Refill: Capillary refill takes less than 2 seconds  Neurological:      Mental Status: He is alert and oriented to person, place, and time     Psychiatric:         Mood and Affect: Mood normal          Behavior: Behavior normal        John Abreu MD

## 2022-11-18 DIAGNOSIS — N52.9 ERECTILE DYSFUNCTION, UNSPECIFIED ERECTILE DYSFUNCTION TYPE: ICD-10-CM

## 2022-11-18 RX ORDER — SILDENAFIL 50 MG/1
TABLET, FILM COATED ORAL
Qty: 10 TABLET | Refills: 0 | Status: SHIPPED | OUTPATIENT
Start: 2022-11-18

## 2022-12-01 DIAGNOSIS — N52.9 ERECTILE DYSFUNCTION, UNSPECIFIED ERECTILE DYSFUNCTION TYPE: ICD-10-CM

## 2022-12-01 RX ORDER — SILDENAFIL 50 MG/1
TABLET, FILM COATED ORAL
Qty: 10 TABLET | Refills: 0 | Status: SHIPPED | OUTPATIENT
Start: 2022-12-01

## 2022-12-16 ENCOUNTER — TELEPHONE (OUTPATIENT)
Dept: OTHER | Facility: OTHER | Age: 57
End: 2022-12-16

## 2022-12-16 DIAGNOSIS — M47.896 OTHER OSTEOARTHRITIS OF SPINE, LUMBAR REGION: ICD-10-CM

## 2022-12-16 DIAGNOSIS — N52.9 ERECTILE DYSFUNCTION, UNSPECIFIED ERECTILE DYSFUNCTION TYPE: ICD-10-CM

## 2022-12-16 DIAGNOSIS — I10 ESSENTIAL HYPERTENSION: ICD-10-CM

## 2022-12-16 DIAGNOSIS — M1A.00X0 IDIOPATHIC CHRONIC GOUT WITHOUT TOPHUS, UNSPECIFIED SITE: ICD-10-CM

## 2022-12-16 DIAGNOSIS — I69.90 LATE EFFECTS OF CVA (CEREBROVASCULAR ACCIDENT): ICD-10-CM

## 2022-12-16 RX ORDER — SILDENAFIL 50 MG/1
50 TABLET, FILM COATED ORAL AS NEEDED
Qty: 10 TABLET | Refills: 0 | Status: SHIPPED | OUTPATIENT
Start: 2022-12-16

## 2022-12-16 NOTE — TELEPHONE ENCOUNTER
Patients spouse called in requesting new refills for all of her  husbands medications on his list  She states the pharmacy told her that all the medications have no refills left and will need to be updated as soon as possible    Please call  Patient back to confirm

## 2022-12-16 NOTE — TELEPHONE ENCOUNTER
Left message for pt's spouse to call our office to clarify which medications they all needed refills on

## 2022-12-16 NOTE — TELEPHONE ENCOUNTER
Medication Refill Request     Name sildenafil (VIAGRA) 50 MG tablet   Dose/Frequency 1 tablet daily as needed  Quantity 10  Verified pharmacy   [x]  Verified ordering Provider   [x]  Does patient have enough for the next 3 days?  Yes [] No [x]

## 2022-12-16 NOTE — TELEPHONE ENCOUNTER
Spoke to pt's spouse, meds sent to [facility] call for approval, sent to [facility] DR KSENIA MOREIRA in [place]

## 2022-12-17 RX ORDER — AMLODIPINE BESYLATE 10 MG/1
10 TABLET ORAL DAILY
Qty: 90 TABLET | Refills: 1 | Status: SHIPPED | OUTPATIENT
Start: 2022-12-17

## 2022-12-17 RX ORDER — ALLOPURINOL 100 MG/1
100 TABLET ORAL DAILY
Qty: 30 TABLET | Refills: 0 | Status: SHIPPED | OUTPATIENT
Start: 2022-12-17

## 2022-12-17 RX ORDER — LISINOPRIL 40 MG/1
40 TABLET ORAL DAILY
Qty: 30 TABLET | Refills: 0 | Status: SHIPPED | OUTPATIENT
Start: 2022-12-17

## 2022-12-17 RX ORDER — PRAVASTATIN SODIUM 10 MG
10 TABLET ORAL
Qty: 30 TABLET | Refills: 0 | Status: SHIPPED | OUTPATIENT
Start: 2022-12-17

## 2022-12-17 RX ORDER — DULOXETIN HYDROCHLORIDE 30 MG/1
30 CAPSULE, DELAYED RELEASE ORAL DAILY
Qty: 90 CAPSULE | Refills: 1 | Status: SHIPPED | OUTPATIENT
Start: 2022-12-17

## 2022-12-17 RX ORDER — METOPROLOL SUCCINATE 25 MG/1
25 TABLET, EXTENDED RELEASE ORAL DAILY
Qty: 30 TABLET | Refills: 2 | Status: SHIPPED | OUTPATIENT
Start: 2022-12-17

## 2022-12-29 ENCOUNTER — HOSPITAL ENCOUNTER (OUTPATIENT)
Dept: SLEEP CENTER | Facility: HOSPITAL | Age: 57
Discharge: HOME | End: 2022-12-29
Attending: INTERNAL MEDICINE

## 2022-12-30 NOTE — PROGRESS NOTES
Patient arrived earlier but they could not find the lab  They went to [address] [place] [address] and the [facility] prior to coming to the lab  Patient was contacted at 8:36 PM to see if he is coming in for his sleep study  Robin spoke with the wife and they were informed they can still come to the lab  Patient arrived to the sleep center at 9:00 PM   They were under the impression that the study will be done at home and that they were just here to  a unit like what he did before (patient did a HST study)  Once patient found out that he needs to sleep over, he immediately refused to do the study  He said he is not doing the study tonight and that he is not mentally ready  He was adamant about doing the study some other times  Patient's wife was very upset because her  does not want to do the study  She is aware he stops breathing at night because they sleep in the same bed  She was aware that sleep apnea contributed to his stroke as well  Tech tried to convinced patient to stay by explaining the procedure for his CPAP study and the set up as well  Unfortunately, he was not interested in doing the study tonight as stated above  He was advised to speak with his doctor

## 2022-12-31 DIAGNOSIS — N52.9 ERECTILE DYSFUNCTION, UNSPECIFIED ERECTILE DYSFUNCTION TYPE: ICD-10-CM

## 2022-12-31 RX ORDER — SILDENAFIL 50 MG/1
TABLET, FILM COATED ORAL
Qty: 10 TABLET | Refills: 0 | Status: SHIPPED | OUTPATIENT
Start: 2022-12-31

## 2023-01-12 DIAGNOSIS — N52.9 ERECTILE DYSFUNCTION, UNSPECIFIED ERECTILE DYSFUNCTION TYPE: ICD-10-CM

## 2023-01-12 RX ORDER — SILDENAFIL 50 MG/1
TABLET, FILM COATED ORAL
Qty: 10 TABLET | Refills: 0 | Status: SHIPPED | OUTPATIENT
Start: 2023-01-12

## 2023-02-07 DIAGNOSIS — N52.9 ERECTILE DYSFUNCTION, UNSPECIFIED ERECTILE DYSFUNCTION TYPE: ICD-10-CM

## 2023-02-07 RX ORDER — SILDENAFIL 50 MG/1
TABLET, FILM COATED ORAL
Qty: 10 TABLET | Refills: 0 | Status: SHIPPED | OUTPATIENT
Start: 2023-02-07

## 2023-02-28 DIAGNOSIS — N52.9 ERECTILE DYSFUNCTION, UNSPECIFIED ERECTILE DYSFUNCTION TYPE: ICD-10-CM

## 2023-02-28 RX ORDER — SILDENAFIL 50 MG/1
TABLET, FILM COATED ORAL
Qty: 10 TABLET | Refills: 0 | Status: SHIPPED | OUTPATIENT
Start: 2023-02-28

## 2023-03-24 DIAGNOSIS — N52.9 ERECTILE DYSFUNCTION, UNSPECIFIED ERECTILE DYSFUNCTION TYPE: ICD-10-CM

## 2023-03-24 RX ORDER — SILDENAFIL 50 MG/1
TABLET, FILM COATED ORAL
Qty: 10 TABLET | Refills: 0 | Status: SHIPPED | OUTPATIENT
Start: 2023-03-24

## 2023-04-26 ENCOUNTER — OFFICE VISIT (OUTPATIENT)
Dept: FAMILY MEDICINE CLINIC | Facility: CLINIC | Age: 58
End: 2023-04-26

## 2023-04-26 VITALS
TEMPERATURE: 97.1 F | HEIGHT: 63 IN | RESPIRATION RATE: 18 BRPM | BODY MASS INDEX: 31.11 KG/M2 | HEART RATE: 66 BPM | SYSTOLIC BLOOD PRESSURE: 142 MMHG | OXYGEN SATURATION: 95 % | WEIGHT: 175.6 LBS | DIASTOLIC BLOOD PRESSURE: 80 MMHG

## 2023-04-26 DIAGNOSIS — I63.9 ACUTE CVA (CEREBROVASCULAR ACCIDENT) (HCC): ICD-10-CM

## 2023-04-26 DIAGNOSIS — Z12.5 SCREENING FOR PROSTATE CANCER: ICD-10-CM

## 2023-04-26 DIAGNOSIS — I69.90 LATE EFFECTS OF CVA (CEREBROVASCULAR ACCIDENT): ICD-10-CM

## 2023-04-26 DIAGNOSIS — M47.896 OTHER OSTEOARTHRITIS OF SPINE, LUMBAR REGION: ICD-10-CM

## 2023-04-26 DIAGNOSIS — I10 ESSENTIAL HYPERTENSION: ICD-10-CM

## 2023-04-26 DIAGNOSIS — G81.91 HEMIPLEGIA AFFECTING RIGHT DOMINANT SIDE, UNSPECIFIED ETIOLOGY, UNSPECIFIED HEMIPLEGIA TYPE (HCC): ICD-10-CM

## 2023-04-26 DIAGNOSIS — Z13.6 SCREENING FOR CARDIOVASCULAR CONDITION: ICD-10-CM

## 2023-04-26 DIAGNOSIS — E78.5 HYPERLIPIDEMIA, UNSPECIFIED HYPERLIPIDEMIA TYPE: ICD-10-CM

## 2023-04-26 DIAGNOSIS — F33.42 RECURRENT MAJOR DEPRESSIVE DISORDER, IN FULL REMISSION (HCC): ICD-10-CM

## 2023-04-26 DIAGNOSIS — M1A.00X0 IDIOPATHIC CHRONIC GOUT WITHOUT TOPHUS, UNSPECIFIED SITE: Primary | ICD-10-CM

## 2023-04-26 RX ORDER — DULOXETIN HYDROCHLORIDE 30 MG/1
30 CAPSULE, DELAYED RELEASE ORAL DAILY
Qty: 90 CAPSULE | Refills: 1 | Status: SHIPPED | OUTPATIENT
Start: 2023-04-26

## 2023-04-26 RX ORDER — METOPROLOL SUCCINATE 25 MG/1
25 TABLET, EXTENDED RELEASE ORAL DAILY
Qty: 90 TABLET | Refills: 1 | Status: SHIPPED | OUTPATIENT
Start: 2023-04-26

## 2023-04-26 RX ORDER — PRAVASTATIN SODIUM 10 MG
10 TABLET ORAL
Qty: 90 TABLET | Refills: 1 | Status: SHIPPED | OUTPATIENT
Start: 2023-04-26

## 2023-04-26 RX ORDER — AMLODIPINE BESYLATE 10 MG/1
10 TABLET ORAL DAILY
Qty: 90 TABLET | Refills: 1 | Status: SHIPPED | OUTPATIENT
Start: 2023-04-26

## 2023-04-26 RX ORDER — ALLOPURINOL 100 MG/1
100 TABLET ORAL DAILY
Qty: 90 TABLET | Refills: 1 | Status: SHIPPED | OUTPATIENT
Start: 2023-04-26

## 2023-04-26 RX ORDER — LISINOPRIL 40 MG/1
40 TABLET ORAL DAILY
Qty: 90 TABLET | Refills: 1 | Status: SHIPPED | OUTPATIENT
Start: 2023-04-26

## 2023-04-26 NOTE — PROGRESS NOTES
Assessment/Plan:       Diagnoses and all orders for this visit:    Idiopathic chronic gout without tophus, unspecified site  -     allopurinol (ZYLOPRIM) 100 mg tablet; Take 1 tablet (100 mg total) by mouth daily  -     CBC and differential; Future  -     Uric acid; Future    Late effects of CVA (cerebrovascular accident)  -     pravastatin (PRAVACHOL) 10 mg tablet; Take 1 tablet (10 mg total) by mouth daily with dinner    Essential hypertension  Comments:  Stable  Has not been on his medications  Renew medications  Follow-up in 2 weeks  Orders:  -     amLODIPine (NORVASC) 10 mg tablet; Take 1 tablet (10 mg total) by mouth daily  -     pravastatin (PRAVACHOL) 10 mg tablet; Take 1 tablet (10 mg total) by mouth daily with dinner  -     lisinopril (ZESTRIL) 40 mg tablet; Take 1 tablet (40 mg total) by mouth daily  -     metoprolol succinate (TOPROL-XL) 25 mg 24 hr tablet; Take 1 tablet (25 mg total) by mouth daily    Other osteoarthritis of spine, lumbar region  -     DULoxetine (CYMBALTA) 30 mg delayed release capsule; Take 1 capsule (30 mg total) by mouth daily    Hemiplegia affecting right dominant side, unspecified etiology, unspecified hemiplegia type (HCC)    Recurrent major depressive disorder, in full remission ([facility] )    Screening for prostate cancer  -     PSA, Total Screen; Future  -     UA (URINE) with reflex to Scope    Acute CVA (cerebrovascular accident) ([facility] )  -     CBC and differential; Future    Screening for cardiovascular condition  -     CBC and differential; Future  -     Comprehensive metabolic panel; Future  -     Lipid panel; Future    Hyperlipidemia, unspecified hyperlipidemia type  -     Lipid panel;  Future        Labs ordered   meds refilled   Jury duty letter written and he is still recovering from his stroke  Had long discussion with patient about colon cancer screening and considering to get one done in the near future  As well as his untreated sleep apnea  I suspect that the "untreated sleep apnea might of created an arrhythmia but led to his stroke  Would like him to follow-up and get this fully evaluated and treated  And he will consider that in the future      Subjective:     Chief Complaint   Patient presents with   • New Patient Visit     Switched insurance, getting establish        Patient ID: Bora Valadez is a 63 y o  male  Patient presents today to establish  He recently had a stroke he is following with cardiology and has a loop recorder implanted  He also follows with neurology  He has no acute complaints today but he does have a form for jury duty      The following portions of the patient's history were reviewed and updated as appropriate: allergies, current medications, past family history, past medical history, past social history, past surgical history and problem list     Review of Systems   Constitutional: Negative  HENT: Negative  Eyes: Negative  Respiratory: Negative  Cardiovascular: Negative  Gastrointestinal: Negative  Endocrine: Negative  Genitourinary: Negative  Musculoskeletal: Negative  Skin: Negative  Allergic/Immunologic: Negative  Neurological: Negative  Hematological: Negative  Psychiatric/Behavioral: Negative  All other systems reviewed and are negative  Objective:    Vitals:    04/26/23 0935   BP: 142/80   BP Location: Left arm   Patient Position: Sitting   Cuff Size: Standard   Pulse: 66   Resp: 18   Temp: (!) 97 1 °F (36 2 °C)   TempSrc: Tympanic   SpO2: 95%   Weight: 79 7 kg (175 lb 9 6 oz)   Height: 5' 3\" (1 6 m)          Physical Exam  Vitals and nursing note reviewed  Constitutional:       General: He is not in acute distress  Appearance: He is well-developed  HENT:      Head: Normocephalic  Right Ear: External ear normal       Left Ear: External ear normal       Nose: Nose normal    Eyes:      General:         Right eye: No discharge  Left eye: No discharge        " Conjunctiva/sclera: Conjunctivae normal       Pupils: Pupils are equal, round, and reactive to light  Cardiovascular:      Rate and Rhythm: Normal rate and regular rhythm  Heart sounds: Normal heart sounds  Pulmonary:      Effort: Pulmonary effort is normal       Breath sounds: Normal breath sounds  Abdominal:      General: Bowel sounds are normal  There is no distension  Palpations: Abdomen is soft  Tenderness: There is no abdominal tenderness  Musculoskeletal:         General: Normal range of motion  Cervical back: Normal range of motion  Skin:     General: Skin is warm and dry  Findings: No rash  Neurological:      Mental Status: He is alert and oriented to person, place, and time  Cranial Nerves: No cranial nerve deficit  Psychiatric:         Behavior: Behavior normal          Thought Content:  Thought content normal          Judgment: Judgment normal

## 2023-04-26 NOTE — LETTER
Date: 4/26/2023    RE: Juan F Cortes,  Juror Number: [id number]    To whom it may concern: This is to certify that Juan F Cortes has been under my care for the following diagnosis: Recent CVA (stroke)  He is stil recovering from a recent ischemic stroke  And is unable to serve at this time        Sincerely,  Glen Salguero, DO

## 2023-05-10 ENCOUNTER — TELEPHONE (OUTPATIENT)
Dept: FAMILY MEDICINE CLINIC | Facility: HOSPITAL | Age: 58
End: 2023-05-10

## 2023-05-25 DIAGNOSIS — N52.9 ERECTILE DYSFUNCTION, UNSPECIFIED ERECTILE DYSFUNCTION TYPE: ICD-10-CM

## 2023-05-25 RX ORDER — SILDENAFIL 50 MG/1
TABLET, FILM COATED ORAL
Qty: 10 TABLET | Refills: 0 | Status: SHIPPED | OUTPATIENT
Start: 2023-05-25

## 2023-07-12 DIAGNOSIS — I10 ESSENTIAL HYPERTENSION: ICD-10-CM

## 2023-07-12 RX ORDER — LISINOPRIL 40 MG/1
40 TABLET ORAL DAILY
Qty: 90 TABLET | Refills: 1 | OUTPATIENT
Start: 2023-07-12

## 2023-08-13 DIAGNOSIS — N52.9 ERECTILE DYSFUNCTION, UNSPECIFIED ERECTILE DYSFUNCTION TYPE: ICD-10-CM

## 2023-08-14 RX ORDER — SILDENAFIL 50 MG/1
TABLET, FILM COATED ORAL
Qty: 10 TABLET | Refills: 1 | Status: SHIPPED | OUTPATIENT
Start: 2023-08-14

## 2023-10-31 ENCOUNTER — OFFICE VISIT (OUTPATIENT)
Dept: FAMILY MEDICINE CLINIC | Facility: CLINIC | Age: 58
End: 2023-10-31
Payer: COMMERCIAL

## 2023-10-31 VITALS
HEART RATE: 63 BPM | TEMPERATURE: 97.1 F | OXYGEN SATURATION: 97 % | HEIGHT: 63 IN | WEIGHT: 172.6 LBS | SYSTOLIC BLOOD PRESSURE: 112 MMHG | DIASTOLIC BLOOD PRESSURE: 78 MMHG | RESPIRATION RATE: 16 BRPM | BODY MASS INDEX: 30.58 KG/M2

## 2023-10-31 DIAGNOSIS — E78.2 MIXED HYPERLIPIDEMIA: ICD-10-CM

## 2023-10-31 DIAGNOSIS — M1A.00X0 IDIOPATHIC CHRONIC GOUT WITHOUT TOPHUS, UNSPECIFIED SITE: ICD-10-CM

## 2023-10-31 DIAGNOSIS — I10 ESSENTIAL HYPERTENSION: ICD-10-CM

## 2023-10-31 DIAGNOSIS — M10.9 GOUT, UNSPECIFIED CAUSE, UNSPECIFIED CHRONICITY, UNSPECIFIED SITE: ICD-10-CM

## 2023-10-31 DIAGNOSIS — I69.90 LATE EFFECTS OF CVA (CEREBROVASCULAR ACCIDENT): ICD-10-CM

## 2023-10-31 DIAGNOSIS — M25.511 CHRONIC PAIN OF BOTH SHOULDERS: ICD-10-CM

## 2023-10-31 DIAGNOSIS — Z12.5 SCREENING FOR PROSTATE CANCER: ICD-10-CM

## 2023-10-31 DIAGNOSIS — M47.896 OTHER OSTEOARTHRITIS OF SPINE, LUMBAR REGION: ICD-10-CM

## 2023-10-31 DIAGNOSIS — M25.512 CHRONIC PAIN OF BOTH SHOULDERS: ICD-10-CM

## 2023-10-31 DIAGNOSIS — Z12.11 SCREENING FOR COLON CANCER: ICD-10-CM

## 2023-10-31 DIAGNOSIS — G89.29 CHRONIC PAIN OF BOTH SHOULDERS: ICD-10-CM

## 2023-10-31 DIAGNOSIS — Z00.00 MEDICARE ANNUAL WELLNESS VISIT, SUBSEQUENT: Primary | ICD-10-CM

## 2023-10-31 PROCEDURE — 3074F SYST BP LT 130 MM HG: CPT | Performed by: FAMILY MEDICINE

## 2023-10-31 PROCEDURE — 3725F SCREEN DEPRESSION PERFORMED: CPT | Performed by: FAMILY MEDICINE

## 2023-10-31 PROCEDURE — 3078F DIAST BP <80 MM HG: CPT | Performed by: FAMILY MEDICINE

## 2023-10-31 PROCEDURE — G0439 PPPS, SUBSEQ VISIT: HCPCS | Performed by: FAMILY MEDICINE

## 2023-10-31 RX ORDER — INDOMETHACIN 50 MG/1
50 CAPSULE ORAL 2 TIMES DAILY PRN
Qty: 60 CAPSULE | Refills: 1 | Status: SHIPPED | OUTPATIENT
Start: 2023-10-31

## 2023-10-31 RX ORDER — LISINOPRIL 40 MG/1
40 TABLET ORAL DAILY
Qty: 90 TABLET | Refills: 1 | Status: SHIPPED | OUTPATIENT
Start: 2023-10-31

## 2023-10-31 RX ORDER — AMLODIPINE BESYLATE 10 MG/1
10 TABLET ORAL DAILY
Qty: 90 TABLET | Refills: 1 | Status: SHIPPED | OUTPATIENT
Start: 2023-10-31

## 2023-10-31 RX ORDER — ALLOPURINOL 100 MG/1
100 TABLET ORAL DAILY
Qty: 90 TABLET | Refills: 1 | Status: SHIPPED | OUTPATIENT
Start: 2023-10-31

## 2023-10-31 RX ORDER — DULOXETIN HYDROCHLORIDE 30 MG/1
30 CAPSULE, DELAYED RELEASE ORAL DAILY
Qty: 90 CAPSULE | Refills: 1 | Status: SHIPPED | OUTPATIENT
Start: 2023-10-31

## 2023-10-31 RX ORDER — METOPROLOL SUCCINATE 25 MG/1
25 TABLET, EXTENDED RELEASE ORAL DAILY
Qty: 90 TABLET | Refills: 1 | Status: SHIPPED | OUTPATIENT
Start: 2023-10-31

## 2023-10-31 RX ORDER — PRAVASTATIN SODIUM 10 MG
10 TABLET ORAL
Qty: 90 TABLET | Refills: 1 | Status: SHIPPED | OUTPATIENT
Start: 2023-10-31

## 2023-10-31 NOTE — PATIENT INSTRUCTIONS
Medicare Preventive Visit Patient Instructions  Thank you for completing your Welcome to Medicare Visit or Medicare Annual Wellness Visit today. Your next wellness visit will be due in one year (10/31/2024). The screening/preventive services that you may require over the next 5-10 years are detailed below. Some tests may not apply to you based off risk factors and/or age. Screening tests ordered at today's visit but not completed yet may show as past due. Also, please note that scanned in results may not display below. Preventive Screenings:  Service Recommendations Previous Testing/Comments   Colorectal Cancer Screening  Colonoscopy    Fecal Occult Blood Test (FOBT)/Fecal Immunochemical Test (FIT)  Fecal DNA/Cologuard Test  Flexible Sigmoidoscopy Age: 43-73 years old   Colonoscopy: every 10 years (May be performed more frequently if at higher risk)  OR  FOBT/FIT: every 1 year  OR  Cologuard: every 3 years  OR  Sigmoidoscopy: every 5 years  Screening may be recommended earlier than age 39 if at higher risk for colorectal cancer. Also, an individualized decision between you and your healthcare provider will decide whether screening between the ages of 77-80 would be appropriate.  Colonoscopy: Not on file  FOBT/FIT: Not on file  Cologuard: Not on file  Sigmoidoscopy: Not on file          Prostate Cancer Screening Individualized decision between patient and health care provider in men between ages of 53-66   Medicare will cover every 12 months beginning on the day after your 50th birthday PSA: No results in last 5 years           Hepatitis C Screening Once for adults born between [birth year] and 1965  More frequently in patients at high risk for Hepatitis C Hep C Antibody: 05/25/2022        Diabetes Screening 1-2 times per year if you're at risk for diabetes or have pre-diabetes Fasting glucose: No results in last 5 years (No results in last 5 years)  A1C: 6.1 % (12/9/2021)      Cholesterol Screening Once every 5 years if you don't have a lipid disorder. May order more often based on risk factors. Lipid panel: 12/09/2021         Other Preventive Screenings Covered by Medicare:  Abdominal Aortic Aneurysm (AAA) Screening: covered once if your at risk. You're considered to be at risk if you have a family history of AAA or a male between the age of 70-76 who smoking at least 100 cigarettes in your lifetime. Lung Cancer Screening: covers low dose CT scan once per year if you meet all of the following conditions: (1) Age 48-67; (2) No signs or symptoms of lung cancer; (3) Current smoker or have quit smoking within the last 15 years; (4) You have a tobacco smoking history of at least 20 pack years (packs per day x number of years you smoked); (5) You get a written order from a healthcare provider. Glaucoma Screening: covered annually if you're considered high risk: (1) You have diabetes OR (2) Family history of glaucoma OR (3)  aged 48 and older OR (3)  American aged 72 and older  Osteoporosis Screening: covered every 2 years if you meet one of the following conditions: (1) Have a vertebral abnormality; (2) On glucocorticoid therapy for more than 3 months; (3) Have primary hyperparathyroidism; (4) On osteoporosis medications and need to assess response to drug therapy. HIV Screening: covered annually if you're between the age of 14-79. Also covered annually if you are younger than 13 and older than 72 with risk factors for HIV infection. For pregnant patients, it is covered up to 3 times per pregnancy.     Immunizations:  Immunization Recommendations   Influenza Vaccine Annual influenza vaccination during flu season is recommended for all persons aged >= 6 months who do not have contraindications   Pneumococcal Vaccine   * Pneumococcal conjugate vaccine = PCV13 (Prevnar 13), PCV15 (Vaxneuvance), PCV20 (Prevnar 20)  * Pneumococcal polysaccharide vaccine = PPSV23 (Pneumovax) Adults 28-23 yo with certain risk factors or if 65+ yo  If never received any pneumonia vaccine: recommend Prevnar 21 (PCV20)  Give PCV20 if previously received 1 dose of PCV13 or PPSV23   Hepatitis B Vaccine 3 dose series if at intermediate or high risk (ex: diabetes, end stage renal disease, liver disease)   Respiratory syncytial virus (RSV) Vaccine - COVERED BY MEDICARE PART D  * RSVPreF3 (Arexvy) CDC recommends that adults 61years of age and older may receive a single dose of RSV vaccine using shared clinical decision-making (SCDM)   Tetanus (Td) Vaccine - COST NOT COVERED BY MEDICARE PART B Following completion of primary series, a booster dose should be given every 10 years to maintain immunity against tetanus. Td may also be given as tetanus wound prophylaxis. Tdap Vaccine - COST NOT COVERED BY MEDICARE PART B Recommended at least once for all adults. For pregnant patients, recommended with each pregnancy. Shingles Vaccine (Shingrix) - COST NOT COVERED BY MEDICARE PART B  2 shot series recommended in those 19 years and older who have or will have weakened immune systems or those 50 years and older     Health Maintenance Due:      Topic Date Due   • Colorectal Cancer Screening  Never done   • HIV Screening  Completed   • Hepatitis C Screening  Completed     Immunizations Due:      Topic Date Due   • COVID-19 Vaccine (1) Never done     Advance Directives   What are advance directives? Advance directives are legal documents that state your wishes and plans for medical care. These plans are made ahead of time in case you lose your ability to make decisions for yourself. Advance directives can apply to any medical decision, such as the treatments you want, and if you want to donate organs. What are the types of advance directives? There are many types of advance directives, and each state has rules about how to use them. You may choose a combination of any of the following:  Living will: This is a written record of the treatment you want.  You can also choose which treatments you do not want, which to limit, and which to stop at a certain time. This includes surgery, medicine, IV fluid, and tube feedings. Durable power of  for healthcare [facility]): This is a written record that states who you want to make healthcare choices for you when you are unable to make them for yourself. This person, called a proxy, is usually a family member or a friend. You may choose more than 1 proxy. Do not resuscitate (DNR) order:  A DNR order is used in case your heart stops beating or you stop breathing. It is a request not to have certain forms of treatment, such as CPR. A DNR order may be included in other types of advance directives. Medical directive: This covers the care that you want if you are in a coma, near death, or unable to make decisions for yourself. You can list the treatments you want for each condition. Treatment may include pain medicine, surgery, blood transfusions, dialysis, IV or tube feedings, and a ventilator (breathing machine). Values history: This document has questions about your views, beliefs, and how you feel and think about life. This information can help others choose the care that you would choose. Why are advance directives important? An advance directive helps you control your care. Although spoken wishes may be used, it is better to have your wishes written down. Spoken wishes can be misunderstood, or not followed. Treatments may be given even if you do not want them. An advance directive may make it easier for your family to make difficult choices about your care. Weight Management   Why it is important to manage your weight:  Being overweight increases your risk of health conditions such as heart disease, high blood pressure, type 2 diabetes, and certain types of cancer. It can also increase your risk for osteoarthritis, sleep apnea, and other respiratory problems. Aim for a slow, steady weight loss.  Even a small amount of weight loss can lower your risk of health problems. How to lose weight safely:  A safe and healthy way to lose weight is to eat fewer calories and get regular exercise. You can lose up about 1 pound a week by decreasing the number of calories you eat by 500 calories each day. Healthy meal plan for weight management:  A healthy meal plan includes a variety of foods, contains fewer calories, and helps you stay healthy. A healthy meal plan includes the following:  Eat whole-grain foods more often. A healthy meal plan should contain fiber. Fiber is the part of grains, fruits, and vegetables that is not broken down by your body. Whole-grain foods are healthy and provide extra fiber in your diet. Some examples of whole-grain foods are whole-wheat breads and pastas, oatmeal, brown rice, and bulgur. Eat a variety of vegetables every day. Include dark, leafy greens such as spinach, kale, nroma greens, and mustard greens. Eat yellow and orange vegetables such as carrots, sweet potatoes, and winter squash. Eat a variety of fruits every day. Choose fresh or canned fruit (canned in its own juice or light syrup) instead of juice. Fruit juice has very little or no fiber. Eat low-fat dairy foods. Drink fat-free (skim) milk or 1% milk. Eat fat-free yogurt and low-fat cottage cheese. Try low-fat cheeses such as mozzarella and other reduced-fat cheeses. Choose meat and other protein foods that are low in fat. Choose beans or other legumes such as split peas or lentils. Choose fish, skinless poultry (chicken or turkey), or lean cuts of red meat (beef or pork). Before you cook meat or poultry, cut off any visible fat. Use less fat and oil. Try baking foods instead of frying them. Add less fat, such as margarine, sour cream, regular salad dressing and mayonnaise to foods. Eat fewer high-fat foods. Some examples of high-fat foods include french fries, doughnuts, ice cream, and cakes. Eat fewer sweets.   Limit foods and drinks that are high in sugar. This includes candy, cookies, regular soda, and sweetened drinks. Exercise:  Exercise at least 30 minutes per day on most days of the week. Some examples of exercise include walking, biking, dancing, and swimming. You can also fit in more physical activity by taking the stairs instead of the elevator or parking farther away from stores. Ask your healthcare provider about the best exercise plan for you. © Copyright [employer] 2018 Information is for End User's use only and may not be sold, redistributed or otherwise used for commercial purposes.  All illustrations and images included in CareNotes® are the copyrighted property of [employer].A[employer]., Inc. or  [employer]

## 2023-10-31 NOTE — PROGRESS NOTES
Assessment and Plan:     Problem List Items Addressed This Visit          Musculoskeletal and Integument    Degenerative joint disease (DJD) of lumbar spine    Relevant Medications    DULoxetine (CYMBALTA) 30 mg delayed release capsule       Other    Chronic pain of both shoulders    Relevant Medications    indomethacin (INDOCIN) 50 mg capsule    Idiopathic chronic gout    Relevant Medications    allopurinol (ZYLOPRIM) 100 mg tablet    Mixed hyperlipidemia    Relevant Medications    pravastatin (PRAVACHOL) 10 mg tablet    Other Relevant Orders    Lipid panel     Other Visit Diagnoses       Medicare annual wellness visit, subsequent    -  Primary    Essential hypertension        Stable. Has not been on his medications. Renew medications. Follow-up in 2 weeks. Relevant Medications    amLODIPine (NORVASC) 10 mg tablet    lisinopril (ZESTRIL) 40 mg tablet    metoprolol succinate (TOPROL-XL) 25 mg 24 hr tablet    pravastatin (PRAVACHOL) 10 mg tablet    Late effects of CVA (cerebrovascular accident)        Relevant Medications    pravastatin (PRAVACHOL) 10 mg tablet    Screening for prostate cancer        Relevant Orders    PSA, Total Screen    UA (URINE) with reflex to Scope    Gout, unspecified cause, unspecified chronicity, unspecified site        Relevant Orders    Uric acid    Screening for colon cancer        Relevant Orders    Cologuard          Medicare wellness visit completed  Cologuard ordered  Labs ordered  Meds refilled  His vital signs are stable  His other chronic conditions are stable he can follow-up with me in 6 months or sooner if needed              BMI Counseling: Body mass index is 30.57 kg/m².  The BMI is above normal. Nutrition recommendations include decreasing portion sizes, encouraging healthy choices of fruits and vegetables, decreasing fast food intake, consuming healthier snacks, limiting drinks that contain sugar, moderation in carbohydrate intake, increasing intake of lean protein, reducing intake of saturated and trans fat and reducing intake of cholesterol. Exercise recommendations include exercising 3-5 times per week. Rationale for BMI follow-up plan is due to patient being overweight or obese. Preventive health issues were discussed with patient, and age appropriate screening tests were ordered as noted in patient's After Visit Summary. Personalized health advice and appropriate referrals for health education or preventive services given if needed, as noted in patient's After Visit Summary. History of Present Illness:     Patient presents for a Medicare Wellness Visit    Patient is here for checkup and Medicare wellness visit  He needs refills of his medications otherwise no other complaints today       Patient Care Team:  Asmita Guadalupe DO as PCP - General (Family Medicine)  Jose Manuel Carrasco MD as PCP - PharmMD ([facility])  Asmita Guadalupe DO as PCP - PCP-[facility] ([facility])  DO Marilynn Espinoza MD Neda Manly, MD Karry Pont, MD     Review of Systems:     Review of Systems   Constitutional: Negative. HENT: Negative. Eyes: Negative. Respiratory: Negative. Cardiovascular: Negative. Gastrointestinal: Negative. Endocrine: Negative. Genitourinary: Negative. Musculoskeletal: Negative. Skin: Negative. Allergic/Immunologic: Negative. Neurological: Negative. Hematological: Negative. Psychiatric/Behavioral: Negative. All other systems reviewed and are negative.        Problem List:     Patient Active Problem List   Diagnosis    Chronic back pain    Degenerative joint disease (DJD) of lumbar spine    Recurrent major depressive disorder, in full remission (HCC)    Benign essential hypertension    Generalized osteoarthritis    Idiopathic chronic gout    Hyperglycemia    Mixed hyperlipidemia    Injury of wrist, left    Knee osteoarthritis    Lumbar radiculopathy    Osteoarthritis of left wrist Polyarthralgia    Primary localized osteoarthritis of right knee    Rotator cuff disorder    Sacral pain    Spondylolisthesis of lumbar region    Tear of infraspinatus tendon, right, sequela    Chronic pain of both shoulders    Other male erectile dysfunction    Hemiplegia affecting right dominant side, unspecified etiology, unspecified hemiplegia type (HCC)    SHOSHANA (obstructive sleep apnea)    Witnessed episode of apnea    Ischemic stroke [facility])      Past Medical and Surgical History:     Past Medical History:   Diagnosis Date    Acute ischemic stroke ([address]) 12/8/2021    · left corona radiata extending inferiorly into the posterior limb of the internal capsule  · Punctate acute infarct right paramedian des · Multifocal changes with historical infarcts    Allergic rhinitis     last assessed 5/15/12; resolved 9/17/14    Anxiety     Bell's palsy     last assessed 10/2/15; resolved 10/2/15    Depression     DJD (degenerative joint disease)     Fracture lumbar vertebra-closed ([address])     last assessed 12/30/14; resolved 12/30/14    Gout     Hypertension     Knee osteoarthritis     Malignant renovascular hypertension      Past Surgical History:   Procedure Laterality Date    CARDIAC ELECTROPHYSIOLOGY PROCEDURE N/A 1/29/2022    Procedure: Cardiac loop recorder implant;  Surgeon: Burton Talavera MD;  Location: [facility];   Service: Cardiology    KNEE ARTHROSCOPY Bilateral     SHOULDER SURGERY  12/2009    arthroscopy      Family History:     Family History   Problem Relation Age of Onset    Hypertension Father       Social History:     Social History     Socioeconomic History    Marital status:      Spouse name: None    Number of children: None    Years of education: None    Highest education level: None   Occupational History    None   Tobacco Use    Smoking status: Never     Passive exposure: Current    Smokeless tobacco: Never   Vaping Use    Vaping Use: Never used   Substance and Sexual Activity    Alcohol use: Not Currently     Comment: social    Drug use: No    Sexual activity: Not Currently   Other Topics Concern    None   Social History Narrative    None     Social Determinants of Health     Financial Resource Strain: Not on file   Food Insecurity: Not on file   Transportation Needs: Not on file   Physical Activity: Not on file   Stress: Not on file   Social Connections: Not on file   Intimate Partner Violence: Not At Risk (10/31/2023)    Humiliation, Afraid, Rape, and Kick questionnaire     Fear of Current or Ex-Partner: No     Emotionally Abused: No     Physically Abused: No     Sexually Abused: No   Housing Stability: Not on file      Medications and Allergies:     Current Outpatient Medications   Medication Sig Dispense Refill    allopurinol (ZYLOPRIM) 100 mg tablet Take 1 tablet (100 mg total) by mouth daily 90 tablet 1    amLODIPine (NORVASC) 10 mg tablet Take 1 tablet (10 mg total) by mouth daily 90 tablet 1    aspirin 81 mg chewable tablet Chew 1 tablet (81 mg total) daily  0    DULoxetine (CYMBALTA) 30 mg delayed release capsule Take 1 capsule (30 mg total) by mouth daily 90 capsule 1    indomethacin (INDOCIN) 50 mg capsule Take 1 capsule (50 mg total) by mouth 2 (two) times a day as needed for mild pain 60 capsule 1    lisinopril (ZESTRIL) 40 mg tablet Take 1 tablet (40 mg total) by mouth daily 90 tablet 1    metoprolol succinate (TOPROL-XL) 25 mg 24 hr tablet Take 1 tablet (25 mg total) by mouth daily 90 tablet 1    pravastatin (PRAVACHOL) 10 mg tablet Take 1 tablet (10 mg total) by mouth daily with dinner 90 tablet 1    sildenafil (VIAGRA) 50 MG tablet TAKE 1 TABLET BY MOUTH ONCE DAILY AS NEEDED FOR ERECTILE DYSFUNCTION 10 tablet 1     No current facility-administered medications for this visit.      Allergies   Allergen Reactions    Lipitor [Atorvastatin] Other (See Comments) and Myalgia     myalgia    Hctz [Hydrochlorothiazide] Rash and Hives      Immunizations:     Immunization History   Administered Date(s) Administered    INFLUENZA 12/24/2021, 11/09/2022    Influenza, recombinant, quadrivalent,injectable, preservative free 11/09/2022    Td (adult), adsorbed 01/01/2001    Tdap 02/01/2018      Health Maintenance:         Topic Date Due    Colorectal Cancer Screening  Never done    HIV Screening  Completed    Hepatitis C Screening  Completed         Topic Date Due    COVID-19 Vaccine (1) Never done      Medicare Screening Tests and Risk Assessments:     Milla Blake is here for his Subsequent Wellness visit. Last Medicare Wellness visit information reviewed, patient interviewed and updates made to the record today. Health Risk Assessment:   Patient rates overall health as good. Patient feels that their physical health rating is same. Patient is satisfied with their life. Eyesight was rated as same. Hearing was rated as same. Patient feels that their emotional and mental health rating is same. Patients states they are never, rarely angry. Patient states they are never, rarely unusually tired/fatigued. Pain experienced in the last 7 days has been none. Patient states that he has experienced no weight loss or gain in last 6 months. Depression Screening:   PHQ-9 Score: 0      Fall Risk Screening: In the past year, patient has experienced: no history of falling in past year      Home Safety:  Patient does not have trouble with stairs inside or outside of their home. Patient has working smoke alarms and has working carbon monoxide detector. Home safety hazards include: none. Nutrition:   Current diet is Regular. Medications:   Patient is currently taking over-the-counter supplements. OTC medications include: see medication list. Patient is able to manage medications. Activities of Daily Living (ADLs)/Instrumental Activities of Daily Living (IADLs):   Walk and transfer into and out of bed and chair?: Yes  Dress and groom yourself?: Yes    Bathe or shower yourself?: Yes    Feed yourself?  Yes  Do your laundry/housekeeping?: Yes  Manage your money, pay your bills and track your expenses?: Yes  Make your own meals?: Yes    Do your own shopping?: Yes    Previous Hospitalizations:   Any hospitalizations or ED visits within the last 12 months?: Yes    How many hospitalizations have you had in the last year?: 1-2    Advance Care Planning:   Living will: Yes    Durable POA for healthcare: Yes    Advanced directive: Yes    Advanced directive counseling given: Yes    End of Life Decisions reviewed with patient: Yes    Provider agrees with end of life decisions: Yes      Cognitive Screening:   Provider or family/friend/caregiver concerned regarding cognition?: No    PREVENTIVE SCREENINGS      Cardiovascular Screening:    General: Screening Not Indicated and Screening Current      Diabetes Screening:     General: Screening Current      Colorectal Cancer Screening:     General: Risks and Benefits Discussed    Due for: Cologuard      Prostate Cancer Screening:    General: Screening Current      Osteoporosis Screening:    General: Screening Not Indicated      Abdominal Aortic Aneurysm (AAA) Screening:        General: Screening Not Indicated      Lung Cancer Screening:     General: Screening Not Indicated      Hepatitis C Screening:    General: Screening Current    Screening, Brief Intervention, and Referral to Treatment (SBIRT)    Screening    Typical number of drinks in a week: 0    Single Item Drug Screening:  How often have you used an illegal drug (including marijuana) or a prescription medication for non-medical reasons in the past year? never    Single Item Drug Screen Score: 0  Interpretation: Negative screen for possible drug use disorder    Brief Intervention  Alcohol & drug use screenings were reviewed. No concerns regarding substance use disorder identified. Other Counseling Topics:   Car/seat belt/driving safety, skin self-exam, sunscreen and calcium and vitamin D intake and regular weightbearing exercise. No results found. Physical Exam:     /78 (BP Location: Left arm, Patient Position: Sitting, Cuff Size: Standard)   Pulse 63   Temp (!) 97.1 °F (36.2 °C) (Tympanic)   Resp 16   Ht 5' 3" (1.6 m)   Wt 78.3 kg (172 lb 9.6 oz)   SpO2 97%   BMI 30.57 kg/m²     Physical Exam  Vitals and nursing note reviewed. Constitutional:       Appearance: Normal appearance. He is well-developed. HENT:      Head: Normocephalic. Right Ear: External ear normal.      Left Ear: External ear normal.      Nose: Nose normal.   Eyes:      Conjunctiva/sclera: Conjunctivae normal.      Pupils: Pupils are equal, round, and reactive to light. Cardiovascular:      Rate and Rhythm: Normal rate and regular rhythm. Heart sounds: Normal heart sounds. Pulmonary:      Effort: Pulmonary effort is normal.      Breath sounds: Normal breath sounds. Abdominal:      General: Bowel sounds are normal.      Palpations: Abdomen is soft. Musculoskeletal:         General: Normal range of motion. Cervical back: Normal range of motion and neck supple. Comments: Uses cane due to stroke   Skin:     General: Skin is warm and dry. Neurological:      General: No focal deficit present. Mental Status: He is alert and oriented to person, place, and time. Psychiatric:         Behavior: Behavior normal.         Thought Content:  Thought content normal.         Judgment: Judgment normal.          Papito Leong, DO

## 2024-06-14 DIAGNOSIS — M1A.00X0 IDIOPATHIC CHRONIC GOUT WITHOUT TOPHUS, UNSPECIFIED SITE: ICD-10-CM

## 2024-06-14 DIAGNOSIS — I10 ESSENTIAL HYPERTENSION: ICD-10-CM

## 2024-06-14 DIAGNOSIS — M47.896 OTHER OSTEOARTHRITIS OF SPINE, LUMBAR REGION: ICD-10-CM

## 2024-06-14 RX ORDER — METOPROLOL SUCCINATE 25 MG/1
25 TABLET, EXTENDED RELEASE ORAL DAILY
Qty: 30 TABLET | Refills: 0 | Status: SHIPPED | OUTPATIENT
Start: 2024-06-14

## 2024-06-14 RX ORDER — ALLOPURINOL 100 MG/1
100 TABLET ORAL DAILY
Qty: 30 TABLET | Refills: 0 | Status: SHIPPED | OUTPATIENT
Start: 2024-06-14

## 2024-06-14 RX ORDER — AMLODIPINE BESYLATE 10 MG/1
10 TABLET ORAL DAILY
Qty: 30 TABLET | Refills: 0 | Status: SHIPPED | OUTPATIENT
Start: 2024-06-14

## 2024-06-14 RX ORDER — DULOXETIN HYDROCHLORIDE 30 MG/1
30 CAPSULE, DELAYED RELEASE ORAL DAILY
Qty: 30 CAPSULE | Refills: 0 | Status: SHIPPED | OUTPATIENT
Start: 2024-06-14

## 2024-06-14 RX ORDER — LISINOPRIL 40 MG/1
40 TABLET ORAL DAILY
Qty: 30 TABLET | Refills: 0 | Status: SHIPPED | OUTPATIENT
Start: 2024-06-14

## 2024-06-14 NOTE — TELEPHONE ENCOUNTER
Patient needs updated blood work (CMP). Please place orders. A courtesy refill was provided.      Patient needs an appointment. Please contact the patient to schedule an appointment. Courtesy refill provided.

## 2024-07-17 DIAGNOSIS — I69.90 LATE EFFECTS OF CVA (CEREBROVASCULAR ACCIDENT): ICD-10-CM

## 2024-07-17 DIAGNOSIS — M1A.00X0 IDIOPATHIC CHRONIC GOUT WITHOUT TOPHUS, UNSPECIFIED SITE: ICD-10-CM

## 2024-07-17 DIAGNOSIS — I10 ESSENTIAL HYPERTENSION: ICD-10-CM

## 2024-07-17 RX ORDER — ALLOPURINOL 100 MG/1
100 TABLET ORAL DAILY
Qty: 30 TABLET | Refills: 0 | Status: SHIPPED | OUTPATIENT
Start: 2024-07-17

## 2024-07-17 RX ORDER — PRAVASTATIN SODIUM 10 MG
TABLET ORAL
Qty: 30 TABLET | Refills: 0 | Status: SHIPPED | OUTPATIENT
Start: 2024-07-17

## 2024-07-17 RX ORDER — METOPROLOL SUCCINATE 25 MG/1
25 TABLET, EXTENDED RELEASE ORAL DAILY
Qty: 30 TABLET | Refills: 5 | Status: SHIPPED | OUTPATIENT
Start: 2024-07-17

## 2024-07-27 DIAGNOSIS — I10 ESSENTIAL HYPERTENSION: ICD-10-CM

## 2024-07-27 DIAGNOSIS — I69.90 LATE EFFECTS OF CVA (CEREBROVASCULAR ACCIDENT): ICD-10-CM

## 2024-07-29 RX ORDER — AMLODIPINE BESYLATE 10 MG/1
10 TABLET ORAL DAILY
Qty: 30 TABLET | Refills: 0 | Status: SHIPPED | OUTPATIENT
Start: 2024-07-29

## 2024-07-30 NOTE — TELEPHONE ENCOUNTER
Called Pt, no vm set up to leave a message so I sent Pt a message through his [employer].  Asked him to call and schedule an appt.

## 2024-08-07 RX ORDER — PRAVASTATIN SODIUM 10 MG
TABLET ORAL
Qty: 30 TABLET | Refills: 0 | OUTPATIENT
Start: 2024-08-07

## 2024-09-03 DIAGNOSIS — I69.90 LATE EFFECTS OF CVA (CEREBROVASCULAR ACCIDENT): ICD-10-CM

## 2024-09-03 DIAGNOSIS — I10 ESSENTIAL HYPERTENSION: ICD-10-CM

## 2024-09-03 DIAGNOSIS — M47.896 OTHER OSTEOARTHRITIS OF SPINE, LUMBAR REGION: ICD-10-CM

## 2024-09-05 RX ORDER — PRAVASTATIN SODIUM 10 MG
TABLET ORAL
Qty: 90 TABLET | Refills: 1 | Status: SHIPPED | OUTPATIENT
Start: 2024-09-05

## 2024-09-05 RX ORDER — LISINOPRIL 40 MG/1
40 TABLET ORAL DAILY
Qty: 90 TABLET | Refills: 1 | Status: SHIPPED | OUTPATIENT
Start: 2024-09-05

## 2024-09-05 RX ORDER — DULOXETIN HYDROCHLORIDE 30 MG/1
30 CAPSULE, DELAYED RELEASE ORAL DAILY
Qty: 90 CAPSULE | Refills: 1 | Status: SHIPPED | OUTPATIENT
Start: 2024-09-05

## 2024-09-05 RX ORDER — AMLODIPINE BESYLATE 10 MG/1
10 TABLET ORAL DAILY
Qty: 90 TABLET | Refills: 1 | Status: SHIPPED | OUTPATIENT
Start: 2024-09-05

## 2025-01-18 DIAGNOSIS — I10 ESSENTIAL HYPERTENSION: ICD-10-CM

## 2025-01-20 RX ORDER — METOPROLOL SUCCINATE 25 MG/1
25 TABLET, EXTENDED RELEASE ORAL DAILY
Qty: 30 TABLET | Refills: 0 | OUTPATIENT
Start: 2025-01-20

## 2025-01-20 NOTE — TELEPHONE ENCOUNTER
Could not leave message on cell #.  Please have Pt make an appt for refill to be sent in after he is seen in office last visit was in 2023

## 2025-01-26 DIAGNOSIS — I69.90 LATE EFFECTS OF CVA (CEREBROVASCULAR ACCIDENT): ICD-10-CM

## 2025-01-26 DIAGNOSIS — I10 ESSENTIAL HYPERTENSION: ICD-10-CM

## 2025-01-27 RX ORDER — METOPROLOL SUCCINATE 25 MG/1
25 TABLET, EXTENDED RELEASE ORAL DAILY
Qty: 30 TABLET | Refills: 0 | OUTPATIENT
Start: 2025-01-27

## 2025-01-27 RX ORDER — PRAVASTATIN SODIUM 10 MG
TABLET ORAL
Qty: 90 TABLET | Refills: 0 | OUTPATIENT
Start: 2025-01-27

## 2025-01-27 NOTE — TELEPHONE ENCOUNTER
Tried to reach out to the patient but his number is not accepting calls, nor taking messages at this time.

## 2025-01-27 NOTE — TELEPHONE ENCOUNTER
Patient needs to schedule appt.   Patient phone was not accepting calls, (nor messages) at this time.  Patient has not set up [employer] account - not able to reach him.

## 2025-02-04 DIAGNOSIS — I10 ESSENTIAL HYPERTENSION: ICD-10-CM

## 2025-02-04 RX ORDER — METOPROLOL SUCCINATE 25 MG/1
25 TABLET, EXTENDED RELEASE ORAL DAILY
Qty: 30 TABLET | Refills: 5 | OUTPATIENT
Start: 2025-02-04

## 2025-02-04 NOTE — TELEPHONE ENCOUNTER
Reason for call:   [x] Refill   [] Prior Auth  [] Other:     Office:   [x] PCP/Provider -   [] Specialty/Provider -     Medication: metoprolol succinate (TOPROL-XL) 25 mg 24 hr tablet     Dose/Frequency: Take 1 tablet by mouth once daily     Quantity: 30    Pharmacy: [address] - [facility] SONIA KITCHEN [phone]    Does the patient have enough for 3 days?   [] Yes   [x] No - Send as HP to POD

## 2025-02-05 RX ORDER — METOPROLOL SUCCINATE 25 MG/1
25 TABLET, EXTENDED RELEASE ORAL DAILY
Qty: 90 TABLET | Refills: 1 | Status: SHIPPED | OUTPATIENT
Start: 2025-02-05

## 2025-02-05 NOTE — TELEPHONE ENCOUNTER
Patient has scheduled his physical, called office to update as per previous conversation with Moni, we were to inform her once apt was scheduled and she would have one of the nurses send the Metoprolol medication over to [facility] ASAP. Patient has been w/o medication for approx 5 days now.

## 2025-02-14 ENCOUNTER — OFFICE VISIT (OUTPATIENT)
Dept: FAMILY MEDICINE CLINIC | Facility: CLINIC | Age: 60
End: 2025-02-14
Payer: MEDICARE

## 2025-02-14 VITALS
HEART RATE: 72 BPM | DIASTOLIC BLOOD PRESSURE: 80 MMHG | BODY MASS INDEX: 32.63 KG/M2 | WEIGHT: 166.2 LBS | SYSTOLIC BLOOD PRESSURE: 110 MMHG | HEIGHT: 60 IN | TEMPERATURE: 98.1 F | RESPIRATION RATE: 16 BRPM | OXYGEN SATURATION: 100 %

## 2025-02-14 DIAGNOSIS — Z12.5 SCREENING FOR PROSTATE CANCER: ICD-10-CM

## 2025-02-14 DIAGNOSIS — R73.03 PREDIABETES: ICD-10-CM

## 2025-02-14 DIAGNOSIS — I69.90 LATE EFFECTS OF CVA (CEREBROVASCULAR ACCIDENT): ICD-10-CM

## 2025-02-14 DIAGNOSIS — I10 ESSENTIAL HYPERTENSION: ICD-10-CM

## 2025-02-14 DIAGNOSIS — Z00.00 MEDICARE ANNUAL WELLNESS VISIT, SUBSEQUENT: Primary | ICD-10-CM

## 2025-02-14 DIAGNOSIS — N52.9 ERECTILE DYSFUNCTION, UNSPECIFIED ERECTILE DYSFUNCTION TYPE: ICD-10-CM

## 2025-02-14 DIAGNOSIS — I63.9 ACUTE CVA (CEREBROVASCULAR ACCIDENT) (HCC): ICD-10-CM

## 2025-02-14 DIAGNOSIS — M25.512 CHRONIC PAIN OF BOTH SHOULDERS: ICD-10-CM

## 2025-02-14 DIAGNOSIS — M47.896 OTHER OSTEOARTHRITIS OF SPINE, LUMBAR REGION: ICD-10-CM

## 2025-02-14 DIAGNOSIS — G89.29 CHRONIC PAIN OF BOTH SHOULDERS: ICD-10-CM

## 2025-02-14 DIAGNOSIS — G81.91 HEMIPLEGIA AFFECTING RIGHT DOMINANT SIDE, UNSPECIFIED ETIOLOGY, UNSPECIFIED HEMIPLEGIA TYPE (HCC): ICD-10-CM

## 2025-02-14 DIAGNOSIS — Z12.12 SCREENING FOR COLORECTAL CANCER: ICD-10-CM

## 2025-02-14 DIAGNOSIS — M25.511 CHRONIC PAIN OF BOTH SHOULDERS: ICD-10-CM

## 2025-02-14 DIAGNOSIS — Z12.11 SCREENING FOR COLORECTAL CANCER: ICD-10-CM

## 2025-02-14 DIAGNOSIS — Z86.73 HISTORY OF CVA (CEREBROVASCULAR ACCIDENT): ICD-10-CM

## 2025-02-14 DIAGNOSIS — M1A.00X0 IDIOPATHIC CHRONIC GOUT WITHOUT TOPHUS, UNSPECIFIED SITE: ICD-10-CM

## 2025-02-14 PROCEDURE — G2211 COMPLEX E/M VISIT ADD ON: HCPCS

## 2025-02-14 PROCEDURE — 99214 OFFICE O/P EST MOD 30 MIN: CPT

## 2025-02-14 PROCEDURE — G0439 PPPS, SUBSEQ VISIT: HCPCS

## 2025-02-14 RX ORDER — ALLOPURINOL 100 MG/1
100 TABLET ORAL DAILY
Qty: 30 TABLET | Refills: 0 | Status: SHIPPED | OUTPATIENT
Start: 2025-02-14

## 2025-02-14 RX ORDER — DULOXETIN HYDROCHLORIDE 30 MG/1
30 CAPSULE, DELAYED RELEASE ORAL DAILY
Qty: 90 CAPSULE | Refills: 1 | Status: SHIPPED | OUTPATIENT
Start: 2025-02-14

## 2025-02-14 RX ORDER — LISINOPRIL 40 MG/1
40 TABLET ORAL DAILY
Qty: 90 TABLET | Refills: 1 | Status: SHIPPED | OUTPATIENT
Start: 2025-02-14

## 2025-02-14 RX ORDER — METOPROLOL SUCCINATE 25 MG/1
25 TABLET, EXTENDED RELEASE ORAL DAILY
Qty: 90 TABLET | Refills: 1 | Status: SHIPPED | OUTPATIENT
Start: 2025-02-14

## 2025-02-14 RX ORDER — SILDENAFIL 50 MG/1
50 TABLET, FILM COATED ORAL AS NEEDED
Qty: 10 TABLET | Refills: 1 | Status: SHIPPED | OUTPATIENT
Start: 2025-02-14

## 2025-02-14 RX ORDER — PRAVASTATIN SODIUM 10 MG
10 TABLET ORAL DAILY
Qty: 90 TABLET | Refills: 1 | Status: SHIPPED | OUTPATIENT
Start: 2025-02-14

## 2025-02-14 RX ORDER — ASPIRIN 81 MG/1
81 TABLET, CHEWABLE ORAL DAILY
Qty: 90 TABLET | Refills: 1 | Status: SHIPPED | OUTPATIENT
Start: 2025-02-14

## 2025-02-14 RX ORDER — AMLODIPINE BESYLATE 10 MG/1
10 TABLET ORAL DAILY
Qty: 90 TABLET | Refills: 1 | Status: SHIPPED | OUTPATIENT
Start: 2025-02-14

## 2025-02-14 RX ORDER — INDOMETHACIN 50 MG/1
50 CAPSULE ORAL 2 TIMES DAILY PRN
Qty: 60 CAPSULE | Refills: 1 | Status: SHIPPED | OUTPATIENT
Start: 2025-02-14

## 2025-02-14 NOTE — ASSESSMENT & PLAN NOTE
Stable. Continue duloxetine 30 mg. Refill today.  Orders:  •  DULoxetine (CYMBALTA) 30 mg delayed release capsule; Take 1 capsule (30 mg total) by mouth daily

## 2025-02-14 NOTE — ASSESSMENT & PLAN NOTE
Stable. No concerns. Continue ASA 81 mg and pravastatin 10 mg. Refills today. The patient will obtain the lab work ordered today. The patient will be notified of results when available and also any further recommendations.  Orders:  •  aspirin 81 mg chewable tablet; Chew 1 tablet (81 mg total) daily  •  pravastatin (PRAVACHOL) 10 mg tablet; Take 1 tablet (10 mg total) by mouth daily  •  CBC and differential; Future  •  Comprehensive metabolic panel; Future  •  Lipid Panel with Direct LDL reflex; Future

## 2025-02-14 NOTE — ASSESSMENT & PLAN NOTE
Some decreased strength on right side. Gait affected. Continue use of cane for additional balance support.

## 2025-02-14 NOTE — ASSESSMENT & PLAN NOTE
Continue healthy dietary modifications. The patient will obtain the lab work ordered today. The patient will be notified of results when available and also any further recommendations.  Lab Results   Component Value Date    HGBA1C 6.1 (H) 12/09/2021       Orders:  •  Hemoglobin A1C; Future

## 2025-02-14 NOTE — PATIENT INSTRUCTIONS
Get labs done. Fasting for about 10 hours.  Trial pravastatin every other day to see if that helps    Medicare Preventive Visit Patient Instructions  Thank you for completing your Welcome to Medicare Visit or Medicare Annual Wellness Visit today. Your next wellness visit will be due in one year (2/15/2026).  The screening/preventive services that you may require over the next 5-10 years are detailed below. Some tests may not apply to you based off risk factors and/or age. Screening tests ordered at today's visit but not completed yet may show as past due. Also, please note that scanned in results may not display below.  Preventive Screenings:  Service Recommendations Previous Testing/Comments   Colorectal Cancer Screening  Colonoscopy    Fecal Occult Blood Test (FOBT)/Fecal Immunochemical Test (FIT)  Fecal DNA/Cologuard Test  Flexible Sigmoidoscopy Age: 45-75 years old   Colonoscopy: every 10 years (May be performed more frequently if at higher risk)  OR  FOBT/FIT: every 1 year  OR  Cologuard: every 3 years  OR  Sigmoidoscopy: every 5 years  Screening may be recommended earlier than age 45 if at higher risk for colorectal cancer. Also, an individualized decision between you and your healthcare provider will decide whether screening between the ages of 76-85 would be appropriate. Colonoscopy: Not on file  FOBT/FIT: Not on file  Cologuard: Not on file  Sigmoidoscopy: Not on file          Prostate Cancer Screening Individualized decision between patient and health care provider in men between ages of 55-69   Medicare will cover every 12 months beginning on the day after your 50th birthday PSA: No results in last 5 years           Hepatitis C Screening Once for adults born between 1945 and 1965  More frequently in patients at high risk for Hepatitis C Hep C Antibody: 05/25/2022        Diabetes Screening 1-2 times per year if you're at risk for diabetes or have pre-diabetes Fasting glucose: No results in last 5 years (No  results in last 5 years)  A1C: 6.1 % (12/9/2021)      Cholesterol Screening Once every 5 years if you don't have a lipid disorder. May order more often based on risk factors. Lipid panel: 12/09/2021         Other Preventive Screenings Covered by Medicare:  Abdominal Aortic Aneurysm (AAA) Screening: covered once if your at risk. You're considered to be at risk if you have a family history of AAA or a male between the age of 65-75 who smoking at least 100 cigarettes in your lifetime.  Lung Cancer Screening: covers low dose CT scan once per year if you meet all of the following conditions: (1) Age 55-77; (2) No signs or symptoms of lung cancer; (3) Current smoker or have quit smoking within the last 15 years; (4) You have a tobacco smoking history of at least 20 pack years (packs per day x number of years you smoked); (5) You get a written order from a healthcare provider.  Glaucoma Screening: covered annually if you're considered high risk: (1) You have diabetes OR (2) Family history of glaucoma OR (3)  aged 50 and older OR (4)  American aged 65 and older  Osteoporosis Screening: covered every 2 years if you meet one of the following conditions: (1) Have a vertebral abnormality; (2) On glucocorticoid therapy for more than 3 months; (3) Have primary hyperparathyroidism; (4) On osteoporosis medications and need to assess response to drug therapy.  HIV Screening: covered annually if you're between the age of 15-65. Also covered annually if you are younger than 15 and older than 65 with risk factors for HIV infection. For pregnant patients, it is covered up to 3 times per pregnancy.    Immunizations:  Immunization Recommendations   Influenza Vaccine Annual influenza vaccination during flu season is recommended for all persons aged >= 6 months who do not have contraindications   Pneumococcal Vaccine   * Pneumococcal conjugate vaccine = PCV13 (Prevnar 13), PCV15 (Vaxneuvance), PCV20 (Prevnar 20)  *  Pneumococcal polysaccharide vaccine = PPSV23 (Pneumovax) Adults 19-63 yo with certain risk factors or if 65+ yo  If never received any pneumonia vaccine: recommend Prevnar 20 (PCV20)  Give PCV20 if previously received 1 dose of PCV13 or PPSV23   Hepatitis B Vaccine 3 dose series if at intermediate or high risk (ex: diabetes, end stage renal disease, liver disease)   Respiratory syncytial virus (RSV) Vaccine - COVERED BY MEDICARE PART D  * RSVPreF3 (Arexvy) CDC recommends that adults 60 years of age and older may receive a single dose of RSV vaccine using shared clinical decision-making (SCDM)   Tetanus (Td) Vaccine - COST NOT COVERED BY MEDICARE PART B Following completion of primary series, a booster dose should be given every 10 years to maintain immunity against tetanus. Td may also be given as tetanus wound prophylaxis.   Tdap Vaccine - COST NOT COVERED BY MEDICARE PART B Recommended at least once for all adults. For pregnant patients, recommended with each pregnancy.   Shingles Vaccine (Shingrix) - COST NOT COVERED BY MEDICARE PART B  2 shot series recommended in those 19 years and older who have or will have weakened immune systems or those 50 years and older     Health Maintenance Due:      Topic Date Due    Colorectal Cancer Screening  Never done    HIV Screening  Completed    Hepatitis C Screening  Completed     Immunizations Due:      Topic Date Due    Influenza Vaccine (1) 09/01/2024    COVID-19 Vaccine (1 - 2024-25 season) Never done     Advance Directives   What are advance directives?  Advance directives are legal documents that state your wishes and plans for medical care. These plans are made ahead of time in case you lose your ability to make decisions for yourself. Advance directives can apply to any medical decision, such as the treatments you want, and if you want to donate organs.   What are the types of advance directives?  There are many types of advance directives, and each state has rules  about how to use them. You may choose a combination of any of the following:  Living will:  This is a written record of the treatment you want. You can also choose which treatments you do not want, which to limit, and which to stop at a certain time. This includes surgery, medicine, IV fluid, and tube feedings.   Durable power of  for healthcare (DPAHC):  This is a written record that states who you want to make healthcare choices for you when you are unable to make them for yourself. This person, called a proxy, is usually a family member or a friend. You may choose more than 1 proxy.  Do not resuscitate (DNR) order:  A DNR order is used in case your heart stops beating or you stop breathing. It is a request not to have certain forms of treatment, such as CPR. A DNR order may be included in other types of advance directives.  Medical directive:  This covers the care that you want if you are in a coma, near death, or unable to make decisions for yourself. You can list the treatments you want for each condition. Treatment may include pain medicine, surgery, blood transfusions, dialysis, IV or tube feedings, and a ventilator (breathing machine).  Values history:  This document has questions about your views, beliefs, and how you feel and think about life. This information can help others choose the care that you would choose.  Why are advance directives important?  An advance directive helps you control your care. Although spoken wishes may be used, it is better to have your wishes written down. Spoken wishes can be misunderstood, or not followed. Treatments may be given even if you do not want them. An advance directive may make it easier for your family to make difficult choices about your care.   Weight Management   Why it is important to manage your weight:  Being overweight increases your risk of health conditions such as heart disease, high blood pressure, type 2 diabetes, and certain types of cancer. It  can also increase your risk for osteoarthritis, sleep apnea, and other respiratory problems. Aim for a slow, steady weight loss. Even a small amount of weight loss can lower your risk of health problems.  How to lose weight safely:  A safe and healthy way to lose weight is to eat fewer calories and get regular exercise. You can lose up about 1 pound a week by decreasing the number of calories you eat by 500 calories each day.   Healthy meal plan for weight management:  A healthy meal plan includes a variety of foods, contains fewer calories, and helps you stay healthy. A healthy meal plan includes the following:  Eat whole-grain foods more often.  A healthy meal plan should contain fiber. Fiber is the part of grains, fruits, and vegetables that is not broken down by your body. Whole-grain foods are healthy and provide extra fiber in your diet. Some examples of whole-grain foods are whole-wheat breads and pastas, oatmeal, brown rice, and bulgur.  Eat a variety of vegetables every day.  Include dark, leafy greens such as spinach, kale, norma greens, and mustard greens. Eat yellow and orange vegetables such as carrots, sweet potatoes, and winter squash.   Eat a variety of fruits every day.  Choose fresh or canned fruit (canned in its own juice or light syrup) instead of juice. Fruit juice has very little or no fiber.  Eat low-fat dairy foods.  Drink fat-free (skim) milk or 1% milk. Eat fat-free yogurt and low-fat cottage cheese. Try low-fat cheeses such as mozzarella and other reduced-fat cheeses.  Choose meat and other protein foods that are low in fat.  Choose beans or other legumes such as split peas or lentils. Choose fish, skinless poultry (chicken or turkey), or lean cuts of red meat (beef or pork). Before you cook meat or poultry, cut off any visible fat.   Use less fat and oil.  Try baking foods instead of frying them. Add less fat, such as margarine, sour cream, regular salad dressing and mayonnaise to  foods. Eat fewer high-fat foods. Some examples of high-fat foods include french fries, doughnuts, ice cream, and cakes.  Eat fewer sweets.  Limit foods and drinks that are high in sugar. This includes candy, cookies, regular soda, and sweetened drinks.  Exercise:  Exercise at least 30 minutes per day on most days of the week. Some examples of exercise include walking, biking, dancing, and swimming. You can also fit in more physical activity by taking the stairs instead of the elevator or parking farther away from stores. Ask your healthcare provider about the best exercise plan for you.      © Copyright [employer] 2018 Information is for End User's use only and may not be sold, redistributed or otherwise used for commercial purposes. All illustrations and images included in CareNotes® are the copyrighted property of A.D.A.M., Inc. or [employer]

## 2025-02-14 NOTE — PROGRESS NOTES
Name: Garth Anders      : 1965      MRN: [id number]  Encounter Provider: ALY Flores  Encounter Date: 2025   Encounter department: [facility]  Medicare annual wellness visit, subsequent  Pt is overdue for labs and healthcare screenings. Will update today as ordered.       Essential hypertension  Stable. Continue amlodipine 10 mg, lisinopril 40 mg, metoprolol 25 mg, and pravastatin 10 mg. Refills today. Continue heart healthy dietary modifications. Continue home BP monitoring with parameters to call > 140/90. The patient will obtain the lab work ordered today. The patient will be notified of results when available and also any further recommendations.  Orders:  •  amLODIPine (NORVASC) 10 mg tablet; Take 1 tablet (10 mg total) by mouth daily  •  lisinopril (ZESTRIL) 40 mg tablet; Take 1 tablet (40 mg total) by mouth daily  •  metoprolol succinate (TOPROL-XL) 25 mg 24 hr tablet; Take 1 tablet (25 mg total) by mouth daily  •  pravastatin (PRAVACHOL) 10 mg tablet; Take 1 tablet (10 mg total) by mouth daily  •  CBC and differential; Future  •  Comprehensive metabolic panel; Future  •  Lipid Panel with Direct LDL reflex; Future  •  TSH, 3rd generation with Free T4 reflex; Future    Acute CVA (cerebrovascular accident) (HCC)  Resolved.       History of CVA (cerebrovascular accident)  Stable. No concerns. Continue ASA 81 mg and pravastatin 10 mg. Refills today. The patient will obtain the lab work ordered today. The patient will be notified of results when available and also any further recommendations.  Orders:  •  aspirin 81 mg chewable tablet; Chew 1 tablet (81 mg total) daily  •  pravastatin (PRAVACHOL) 10 mg tablet; Take 1 tablet (10 mg total) by mouth daily  •  CBC and differential; Future  •  Comprehensive metabolic panel; Future  •  Lipid Panel with Direct LDL reflex; Future    Late effects of CVA (cerebrovascular accident)  Stable. No new  deficits. Continue pravastatin 10 mg. Refill today.   Orders:  •  pravastatin (PRAVACHOL) 10 mg tablet; Take 1 tablet (10 mg total) by mouth daily    Hemiplegia affecting right dominant side, unspecified etiology, unspecified hemiplegia type (HCC)  Some decreased strength on right side. Gait affected. Continue use of cane for additional balance support.       Erectile dysfunction, unspecified erectile dysfunction type  Continue sildenafil 50 mg PRN. Refill today.  Orders:  •  sildenafil (VIAGRA) 50 MG tablet; Take 1 tablet (50 mg total) by mouth as needed for erectile dysfunction    Prediabetes  Continue healthy dietary modifications. The patient will obtain the lab work ordered today. The patient will be notified of results when available and also any further recommendations.  Lab Results   Component Value Date    HGBA1C 6.1 (H) 2021       Orders:  •  Hemoglobin A1C; Future    Idiopathic chronic gout without tophus, unspecified site  Stable. Continue as prescribed.  Orders:  •  allopurinol (ZYLOPRIM) 100 mg tablet; Take 1 tablet (100 mg total) by mouth daily    Other osteoarthritis of spine, lumbar region  Stable. Continue duloxetine 30 mg. Refill today.  Orders:  •  DULoxetine (CYMBALTA) 30 mg delayed release capsule; Take 1 capsule (30 mg total) by mouth daily    Chronic pain of both shoulders  Stable. Continue indomethacin 50 mg BID PRN. Refill today.  Orders:  •  indomethacin (INDOCIN) 50 mg capsule; Take 1 capsule (50 mg total) by mouth 2 (two) times a day as needed for mild pain    Screening for colorectal cancer  Previous Cologuard . Reordered today. Pt encouraged to complete testing. The patient will obtain the lab work ordered today. The patient will be notified of results when available and also any further recommendations.    Orders:  •  Cologuard    Screening for prostate cancer    Orders:  •  PSA, Total Screen; Future      Depression Screening and Follow-up Plan: Patient was screened for  depression during today's encounter. They screened negative with a PHQ-9 score of 0.      Declines flu vaccination  Follow up in 6 months or sooner if needed.    Preventive health issues were discussed with patient, and age appropriate screening tests were ordered as noted in patient's After Visit Summary. Personalized health advice and appropriate referrals for health education or preventive services given if needed, as noted in patient's After Visit Summary.    History of Present Illness   {?Quick Links Encounters * My Last Note * Last Note in Specialty * Snapshot * Since Last Visit * History :17085}  [facility]   Patient Care Team:  Papito Leong DO as PCP - General (Family Medicine)  Josse Spicer MD as PCP - PCP-[facility] ([facility])  Papito Le[facility]  DO Shree Brar MD Celestine Nnaeto, MD Wei-Shen Lin, MD    Review of Systems   Constitutional: Negative.  Negative for chills, fatigue and fever.   HENT: Negative.  Negative for congestion, ear pain, rhinorrhea and sore throat.    Eyes:  Positive for redness (left eye. pt reports he got something in it yesterday. improving. no pain). Negative for photophobia, pain, discharge, itching and visual disturbance.   Respiratory: Negative.  Negative for cough and shortness of breath.    Cardiovascular: Negative.  Negative for chest pain, palpitations and leg swelling.   Gastrointestinal:  Negative for abdominal pain, constipation, diarrhea, nausea and vomiting.   Endocrine: Negative.    Genitourinary: Negative.  Negative for dysuria, frequency and urgency.   Musculoskeletal: Negative.  Negative for back pain and myalgias.   Skin: Negative.  Negative for rash.   Allergic/Immunologic: Negative.    Neurological: Negative.  Negative for dizziness, weakness, light-headedness and headaches.   Hematological: Negative.    Psychiatric/Behavioral: Negative.       Medical History Reviewed by provider this encounter:   Tobacco  Allergies  Meds  Problems  Med Hx  Surg Hx  Fam Hx       Annual Wellness Visit Questionnaire   Garth is here for his Subsequent Wellness visit. Last Medicare Wellness visit information reviewed, patient interviewed and updates made to the record today.      Health Risk Assessment:   Patient rates overall health as good. Patient feels that their physical health rating is slightly worse. Patient is satisfied with their life. Eyesight was rated as same. Hearing was rated as same. Patient feels that their emotional and mental health rating is same. Patients states they are never, rarely angry. Patient states they are often unusually tired/fatigued. Pain experienced in the last 7 days has been none. Patient states that he has experienced no weight loss or gain in last 6 months.     Depression Screening:   PHQ-9 Score: 0      Fall Risk Screening:   In the past year, patient has experienced: history of falling in past year    Number of falls: 2 or more  Injured during fall?: No    Feels unsteady when standing or walking?: Yes    Worried about falling?: Yes      Home Safety:  Patient has trouble with stairs inside or outside of their home. Patient has working smoke alarms and has working carbon monoxide detector. Home safety hazards include: none.     Nutrition:   Current diet is Regular, No Added Salt and Low Carb.     Medications:   Patient is not currently taking any over-the-counter supplements. Patient is able to manage medications.     Activities of Daily Living (ADLs)/Instrumental Activities of Daily Living (IADLs):   Walk and transfer into and out of bed and chair?: Yes  Dress and groom yourself?: Yes    Bathe or shower yourself?: Yes    Feed yourself? Yes  Do your laundry/housekeeping?: Yes  Manage your money, pay your bills and track your expenses?: Yes  Make your own meals?: Yes    Do your own shopping?: Yes    Previous Hospitalizations:   Any hospitalizations or ED visits within the last 12  months?: No      Advance Care Planning:   Living will: Yes    Durable POA for healthcare: Yes    Advanced directive: Yes      Cognitive Screening:   Provider or family/friend/caregiver concerned regarding cognition?: No    PREVENTIVE SCREENINGS      Cardiovascular Screening:    General: History Lipid Disorder and Risks and Benefits Discussed    Due for: Lipid Panel      Diabetes Screening:     General: Risks and Benefits Discussed    Due for: Blood Glucose      Colorectal Cancer Screening:     General: Risks and Benefits Discussed    Due for: Cologuard      Prostate Cancer Screening:    General: Risks and Benefits Discussed    Due for: PSA      Lung Cancer Screening:     General: Screening Not Indicated      Hepatitis C Screening:    General: Screening Current    Screening, Brief Intervention, and Referral to Treatment (SBIRT)     Screening  Typical number of drinks in a day: 0  Typical number of drinks in a week: 0  Interpretation: Low risk drinking behavior.    Single Item Drug Screening:  How often have you used an illegal drug (including marijuana) or a prescription medication for non-medical reasons in the past year? never    Single Item Drug Screen Score: 0  Interpretation: Negative screen for possible drug use disorder    Brief Intervention  Alcohol & drug use screenings were reviewed. No concerns regarding substance use disorder identified.     Other Counseling Topics:   Car/seat belt/driving safety, skin self-exam, sunscreen and regular weightbearing exercise and calcium and vitamin D intake.     Social Drivers of Health     Financial Resource Strain: Low Risk  (10/31/2023)    Overall Financial Resource Strain (CARDIA)    • Difficulty of Paying Living Expenses: Not very hard   Transportation Needs: No Transportation Needs (10/31/2023)    PRAPARE - Transportation    • Lack of Transportation (Medical): No    • Lack of Transportation (Non-Medical): No     Vision Screening    Right eye Left eye Both eyes  "  Without correction   20/20   With correction          Objective {?Quick Links Trend Vitals * Enter New Vitals * Results Review * Timeline (Adult) * Labs * Imaging * Cardiology * Procedures * Lung Cancer Screening * Surgical eConsent :10326}  /80 (BP Location: Left arm, Patient Position: Sitting, Cuff Size: Large)   Pulse 72   Temp 98.1 °F (36.7 °C) (Tympanic)   Resp 16   Ht 4' 3.7\" (1.313 m)   Wt 75.4 kg (166 lb 3.2 oz)   SpO2 100%   BMI 43.72 kg/m²     Physical Exam  Vitals and nursing note reviewed.   Constitutional:       General: He is not in acute distress.     Appearance: Normal appearance. He is not ill-appearing.   HENT:      Head: Normocephalic and atraumatic.      Right Ear: Tympanic membrane, ear canal and external ear normal.      Left Ear: Tympanic membrane, ear canal and external ear normal.      Nose: Nose normal. No congestion.      Mouth/Throat:      Mouth: Mucous membranes are moist.      Pharynx: Oropharynx is clear. No posterior oropharyngeal erythema.   Eyes:      Extraocular Movements: Extraocular movements intact.      Conjunctiva/sclera:      Left eye: Left conjunctiva is injected. No exudate.     Pupils: Pupils are equal, round, and reactive to light.   Cardiovascular:      Rate and Rhythm: Normal rate and regular rhythm.      Heart sounds: Normal heart sounds. No murmur heard.  Pulmonary:      Effort: Pulmonary effort is normal. No respiratory distress.      Breath sounds: Normal breath sounds. No wheezing.   Abdominal:      General: Abdomen is flat. Bowel sounds are normal.      Palpations: Abdomen is soft.      Tenderness: There is no abdominal tenderness.   Musculoskeletal:         General: No tenderness. Normal range of motion.      Cervical back: Normal range of motion and neck supple. No tenderness.   Lymphadenopathy:      Cervical: No cervical adenopathy.   Skin:     General: Skin is warm and dry.      Capillary Refill: Capillary refill takes less than 2 seconds.      " Findings: No bruising or rash.   Neurological:      General: No focal deficit present.      Mental Status: He is alert and oriented to person, place, and time. Mental status is at baseline.      Cranial Nerves: Cranial nerves 2-12 are intact. No facial asymmetry.      Sensory: Sensation is intact.      Motor: Weakness present.      Coordination: Coordination is intact.      Gait: Gait abnormal.      Comments: Cane  4/5 right lower extremity    Psychiatric:         Attention and Perception: Attention normal.         Mood and Affect: Mood normal.         Behavior: Behavior normal.      Comments: Mild articulation impairment of speech

## 2025-02-14 NOTE — ASSESSMENT & PLAN NOTE
Stable. Continue as prescribed.  Orders:  •  allopurinol (ZYLOPRIM) 100 mg tablet; Take 1 tablet (100 mg total) by mouth daily

## 2025-02-21 NOTE — ASSESSMENT & PLAN NOTE
Patient with known history of left knee arthritis  Has had chronic knee pain with this  Advise further evaluation by Orthopedics  May need to consider knee replacement  Pt's last refill was 1/25/25, but it was noted that pt needed BMP labs done. Please advise if okay to send a refill or if pt should complete labs prior to refill?     Pt also overdue for follow up visit.

## 2025-06-27 ENCOUNTER — APPOINTMENT (OUTPATIENT)
Dept: LAB | Facility: CLINIC | Age: 60
End: 2025-06-27
Payer: MEDICARE

## 2025-06-27 DIAGNOSIS — E78.5 HYPERLIPIDEMIA, UNSPECIFIED HYPERLIPIDEMIA TYPE: ICD-10-CM

## 2025-06-27 DIAGNOSIS — I71.20 THORACIC AORTIC ANEURYSM (TAA), UNSPECIFIED PART, UNSPECIFIED WHETHER RUPTURED (HCC): ICD-10-CM

## 2025-06-27 DIAGNOSIS — I10 ESSENTIAL HYPERTENSION, MALIGNANT: ICD-10-CM

## 2025-06-27 LAB
ALBUMIN SERPL BCG-MCNC: 4 G/DL (ref 3.5–5)
ALP SERPL-CCNC: 81 U/L (ref 34–104)
ALT SERPL W P-5'-P-CCNC: 18 U/L (ref 7–52)
ANION GAP SERPL CALCULATED.3IONS-SCNC: 5 MMOL/L (ref 4–13)
AST SERPL W P-5'-P-CCNC: 22 U/L (ref 13–39)
BILIRUB SERPL-MCNC: 0.47 MG/DL (ref 0.2–1)
BUN SERPL-MCNC: 18 MG/DL (ref 5–25)
CALCIUM SERPL-MCNC: 9.1 MG/DL (ref 8.4–10.2)
CHLORIDE SERPL-SCNC: 102 MMOL/L (ref 96–108)
CO2 SERPL-SCNC: 29 MMOL/L (ref 21–32)
CREAT SERPL-MCNC: 0.81 MG/DL (ref 0.6–1.3)
GFR SERPL CREATININE-BSD FRML MDRD: 97 ML/MIN/1.73SQ M
GLUCOSE P FAST SERPL-MCNC: 106 MG/DL (ref 65–99)
POTASSIUM SERPL-SCNC: 4.4 MMOL/L (ref 3.5–5.3)
PROT SERPL-MCNC: 7.2 G/DL (ref 6.4–8.4)
SODIUM SERPL-SCNC: 136 MMOL/L (ref 135–147)

## 2025-06-27 PROCEDURE — 80053 COMPREHEN METABOLIC PANEL: CPT

## 2025-06-27 PROCEDURE — 36415 COLL VENOUS BLD VENIPUNCTURE: CPT
